# Patient Record
Sex: FEMALE | Race: BLACK OR AFRICAN AMERICAN | NOT HISPANIC OR LATINO | Employment: OTHER | RURAL
[De-identification: names, ages, dates, MRNs, and addresses within clinical notes are randomized per-mention and may not be internally consistent; named-entity substitution may affect disease eponyms.]

---

## 2014-05-13 LAB — CRC RECOMMENDATION EXT: NORMAL

## 2021-04-06 RX ORDER — HYDROCODONE BITARTRATE AND ACETAMINOPHEN 7.5; 325 MG/1; MG/1
1 TABLET ORAL 3 TIMES DAILY PRN
COMMUNITY
Start: 2021-03-01 | End: 2021-04-06 | Stop reason: SDUPTHER

## 2021-04-06 RX ORDER — POTASSIUM CHLORIDE 1500 MG/1
20 TABLET, EXTENDED RELEASE ORAL DAILY
Qty: 30 TABLET | Refills: 0 | Status: SHIPPED | OUTPATIENT
Start: 2021-04-06 | End: 2021-11-10

## 2021-04-06 RX ORDER — POTASSIUM CHLORIDE 1500 MG/1
20 TABLET, EXTENDED RELEASE ORAL DAILY
COMMUNITY
Start: 2021-03-01 | End: 2021-04-06 | Stop reason: SDUPTHER

## 2021-04-06 RX ORDER — LINACLOTIDE 145 UG/1
145 CAPSULE, GELATIN COATED ORAL DAILY
Qty: 30 CAPSULE | Refills: 3 | Status: SHIPPED | OUTPATIENT
Start: 2021-04-06 | End: 2021-05-17 | Stop reason: SDUPTHER

## 2021-04-06 RX ORDER — LINACLOTIDE 145 UG/1
1 CAPSULE, GELATIN COATED ORAL DAILY
COMMUNITY
Start: 2021-03-18 | End: 2021-04-06 | Stop reason: SDUPTHER

## 2021-04-06 RX ORDER — HYDROCODONE BITARTRATE AND ACETAMINOPHEN 7.5; 325 MG/1; MG/1
1 TABLET ORAL 3 TIMES DAILY PRN
Qty: 30 TABLET | Refills: 0 | Status: SHIPPED | OUTPATIENT
Start: 2021-04-06 | End: 2021-05-17 | Stop reason: SDUPTHER

## 2021-04-15 RX ORDER — NAPROXEN 500 MG/1
1 TABLET ORAL DAILY
COMMUNITY
Start: 2021-03-25 | End: 2021-05-17 | Stop reason: SDUPTHER

## 2021-04-15 RX ORDER — FUROSEMIDE 40 MG/1
1 TABLET ORAL DAILY
COMMUNITY
Start: 2021-03-17 | End: 2021-07-23 | Stop reason: SDUPTHER

## 2021-04-19 ENCOUNTER — OFFICE VISIT (OUTPATIENT)
Dept: FAMILY MEDICINE | Facility: CLINIC | Age: 66
End: 2021-04-19
Payer: MEDICARE

## 2021-04-19 VITALS
WEIGHT: 173 LBS | DIASTOLIC BLOOD PRESSURE: 71 MMHG | RESPIRATION RATE: 16 BRPM | BODY MASS INDEX: 29.53 KG/M2 | TEMPERATURE: 99 F | HEIGHT: 64 IN | OXYGEN SATURATION: 98 % | HEART RATE: 55 BPM | SYSTOLIC BLOOD PRESSURE: 144 MMHG

## 2021-04-19 DIAGNOSIS — G89.29 CHRONIC LEFT-SIDED LOW BACK PAIN WITHOUT SCIATICA: ICD-10-CM

## 2021-04-19 DIAGNOSIS — E87.6 HYPOKALEMIA: Primary | ICD-10-CM

## 2021-04-19 DIAGNOSIS — M54.50 CHRONIC LEFT-SIDED LOW BACK PAIN WITHOUT SCIATICA: ICD-10-CM

## 2021-04-19 PROCEDURE — 96372 THER/PROPH/DIAG INJ SC/IM: CPT | Mod: ,,, | Performed by: FAMILY MEDICINE

## 2021-04-19 PROCEDURE — 99214 PR OFFICE/OUTPT VISIT, EST, LEVL IV, 30-39 MIN: ICD-10-PCS | Mod: 25,,, | Performed by: FAMILY MEDICINE

## 2021-04-19 PROCEDURE — 99214 OFFICE O/P EST MOD 30 MIN: CPT | Mod: 25,,, | Performed by: FAMILY MEDICINE

## 2021-04-19 PROCEDURE — 96372 PR INJECTION,THERAP/PROPH/DIAG2ST, IM OR SUBCUT: ICD-10-PCS | Mod: ,,, | Performed by: FAMILY MEDICINE

## 2021-04-19 RX ORDER — KETOROLAC TROMETHAMINE 30 MG/ML
60 INJECTION, SOLUTION INTRAMUSCULAR; INTRAVENOUS
Status: COMPLETED | OUTPATIENT
Start: 2021-04-19 | End: 2021-04-19

## 2021-04-19 RX ORDER — TRAMADOL HYDROCHLORIDE 50 MG/1
50 TABLET ORAL EVERY 6 HOURS PRN
Qty: 21 TABLET | Refills: 0 | Status: SHIPPED | OUTPATIENT
Start: 2021-04-19 | End: 2021-11-10

## 2021-04-19 RX ADMIN — KETOROLAC TROMETHAMINE 60 MG: 30 INJECTION, SOLUTION INTRAMUSCULAR; INTRAVENOUS at 01:04

## 2021-04-29 DIAGNOSIS — N39.0 URINARY TRACT INFECTION WITHOUT HEMATURIA, SITE UNSPECIFIED: Primary | ICD-10-CM

## 2021-04-29 RX ORDER — NITROFURANTOIN 25; 75 MG/1; MG/1
100 CAPSULE ORAL 2 TIMES DAILY
Qty: 14 CAPSULE | Refills: 0 | Status: SHIPPED | OUTPATIENT
Start: 2021-04-29 | End: 2021-05-06

## 2021-05-17 ENCOUNTER — OFFICE VISIT (OUTPATIENT)
Dept: FAMILY MEDICINE | Facility: CLINIC | Age: 66
End: 2021-05-17
Payer: MEDICARE

## 2021-05-17 VITALS
HEART RATE: 50 BPM | HEIGHT: 62 IN | OXYGEN SATURATION: 100 % | RESPIRATION RATE: 16 BRPM | WEIGHT: 171 LBS | SYSTOLIC BLOOD PRESSURE: 126 MMHG | DIASTOLIC BLOOD PRESSURE: 71 MMHG | BODY MASS INDEX: 31.47 KG/M2 | TEMPERATURE: 99 F

## 2021-05-17 DIAGNOSIS — G89.29 CHRONIC BILATERAL LOW BACK PAIN WITHOUT SCIATICA: ICD-10-CM

## 2021-05-17 DIAGNOSIS — M54.50 CHRONIC BILATERAL LOW BACK PAIN WITHOUT SCIATICA: ICD-10-CM

## 2021-05-17 DIAGNOSIS — R60.9 RETENTION OF FLUID: Primary | ICD-10-CM

## 2021-05-17 DIAGNOSIS — R52 PAIN: ICD-10-CM

## 2021-05-17 DIAGNOSIS — K59.00 CONSTIPATION, UNSPECIFIED CONSTIPATION TYPE: ICD-10-CM

## 2021-05-17 PROCEDURE — 99214 OFFICE O/P EST MOD 30 MIN: CPT | Mod: ,,, | Performed by: FAMILY MEDICINE

## 2021-05-17 PROCEDURE — 80305 DRUG TEST PRSMV DIR OPT OBS: CPT | Mod: GA,RHCUB | Performed by: FAMILY MEDICINE

## 2021-05-17 PROCEDURE — 99214 PR OFFICE/OUTPT VISIT, EST, LEVL IV, 30-39 MIN: ICD-10-PCS | Mod: ,,, | Performed by: FAMILY MEDICINE

## 2021-05-17 RX ORDER — HYDROCODONE BITARTRATE AND ACETAMINOPHEN 7.5; 325 MG/1; MG/1
1 TABLET ORAL 3 TIMES DAILY PRN
Qty: 30 TABLET | Refills: 0 | Status: SHIPPED | OUTPATIENT
Start: 2021-05-17 | End: 2021-06-14 | Stop reason: SDUPTHER

## 2021-05-17 RX ORDER — FUROSEMIDE 40 MG/1
40 TABLET ORAL DAILY
Qty: 30 TABLET | Refills: 0 | Status: CANCELLED | OUTPATIENT
Start: 2021-05-17

## 2021-05-17 RX ORDER — LINACLOTIDE 145 UG/1
145 CAPSULE, GELATIN COATED ORAL DAILY
Qty: 30 CAPSULE | Refills: 3 | Status: SHIPPED | OUTPATIENT
Start: 2021-05-17 | End: 2021-07-23 | Stop reason: SDUPTHER

## 2021-05-17 RX ORDER — POTASSIUM CHLORIDE 1500 MG/1
20 TABLET, EXTENDED RELEASE ORAL DAILY
Qty: 30 TABLET | Refills: 0 | Status: CANCELLED | OUTPATIENT
Start: 2021-05-17

## 2021-05-17 RX ORDER — NAPROXEN 500 MG/1
500 TABLET ORAL DAILY
Qty: 60 TABLET | Refills: 0 | Status: SHIPPED | OUTPATIENT
Start: 2021-05-17 | End: 2021-06-14 | Stop reason: SDUPTHER

## 2021-05-18 ENCOUNTER — LAB REQUISITION (OUTPATIENT)
Dept: LAB | Facility: HOSPITAL | Age: 66
End: 2021-05-18
Attending: FAMILY MEDICINE
Payer: MEDICARE

## 2021-05-18 DIAGNOSIS — E87.6 HYPOKALEMIA: ICD-10-CM

## 2021-05-18 DIAGNOSIS — N39.0 URINARY TRACT INFECTION, SITE NOT SPECIFIED: ICD-10-CM

## 2021-05-18 LAB
ALBUMIN SERPL BCP-MCNC: 3.3 G/DL (ref 3.5–5)
ALBUMIN/GLOB SERPL: 1.1 {RATIO}
ALP SERPL-CCNC: 86 U/L (ref 55–142)
ALT SERPL W P-5'-P-CCNC: 27 U/L (ref 13–56)
ANION GAP SERPL CALCULATED.3IONS-SCNC: 9 MMOL/L (ref 7–16)
AST SERPL W P-5'-P-CCNC: 21 U/L (ref 15–37)
BACTERIA #/AREA URNS HPF: ABNORMAL /HPF
BASOPHILS # BLD AUTO: 0.01 K/UL (ref 0–0.2)
BASOPHILS NFR BLD AUTO: 0.2 % (ref 0–1)
BILIRUB SERPL-MCNC: 0.9 MG/DL (ref 0–1.2)
BILIRUB UR QL STRIP: NEGATIVE
BUN SERPL-MCNC: 12 MG/DL (ref 7–18)
BUN/CREAT SERPL: 16 (ref 6–20)
CALCIUM SERPL-MCNC: 8.4 MG/DL (ref 8.5–10.1)
CHLORIDE SERPL-SCNC: 106 MMOL/L (ref 98–107)
CLARITY UR: ABNORMAL
CO2 SERPL-SCNC: 28 MMOL/L (ref 21–32)
COLOR UR: YELLOW
CREAT SERPL-MCNC: 0.76 MG/DL (ref 0.55–1.02)
CTP QC/QA: YES
DIFFERENTIAL METHOD BLD: ABNORMAL
EOSINOPHIL # BLD AUTO: 0.02 K/UL (ref 0–0.5)
EOSINOPHIL NFR BLD AUTO: 0.4 % (ref 1–4)
ERYTHROCYTE [DISTWIDTH] IN BLOOD BY AUTOMATED COUNT: 13.3 % (ref 11.5–14.5)
GLOBULIN SER-MCNC: 3.1 G/DL (ref 2–4)
GLUCOSE SERPL-MCNC: 101 MG/DL (ref 74–106)
GLUCOSE UR STRIP-MCNC: NEGATIVE MG/DL
HCT VFR BLD AUTO: 33.7 % (ref 38–47)
HGB BLD-MCNC: 11 G/DL (ref 12–16)
KETONES UR STRIP-SCNC: NEGATIVE MG/DL
LEUKOCYTE ESTERASE UR QL STRIP: ABNORMAL
LYMPHOCYTES # BLD AUTO: 1.79 K/UL (ref 1–4.8)
LYMPHOCYTES NFR BLD AUTO: 38.8 % (ref 27–41)
MCH RBC QN AUTO: 29.6 PG (ref 27–31)
MCHC RBC AUTO-ENTMCNC: 32.6 G/DL (ref 32–36)
MCV RBC AUTO: 90.6 FL (ref 80–96)
MONOCYTES # BLD AUTO: 0.24 K/UL (ref 0–0.8)
MONOCYTES NFR BLD AUTO: 5.2 % (ref 2–6)
MPC BLD CALC-MCNC: 10.8 FL (ref 9.4–12.4)
MUCOUS THREADS #/AREA URNS HPF: ABNORMAL /HPF
NEUTROPHILS # BLD AUTO: 2.55 K/UL (ref 1.8–7.7)
NEUTROPHILS NFR BLD AUTO: 55.4 % (ref 53–65)
NITRITE UR QL STRIP: NEGATIVE
PH UR STRIP: 6.5 PH UNITS
PLATELET # BLD AUTO: 307 K/UL (ref 150–400)
POC (AMP) AMPHETAMINE: NEGATIVE
POC (BAR) BARBITURATES: NEGATIVE
POC (BUP) BUPRENORPHINE: NEGATIVE
POC (BZO) BENZODIAZEPINES: NEGATIVE
POC (COC) COCAINE: NEGATIVE
POC (MDMA) METHYLENEDIOXYMETHAMPHETAMINE 3,4: NEGATIVE
POC (MET) METHAMPHETAMINE: NEGATIVE
POC (MOP) OPIATES: NEGATIVE
POC (MTD) METHADONE: NEGATIVE
POC (OXY) OXYCODONE: NEGATIVE
POC (PCP) PHENCYCLIDINE: NEGATIVE
POC (TCA) TRICYCLIC ANTIDEPRESSANTS: NEGATIVE
POC TEMPERATURE (URINE): 92
POC THC: NEGATIVE
POTASSIUM SERPL-SCNC: 4 MMOL/L (ref 3.5–5.1)
PROT SERPL-MCNC: 6.4 G/DL (ref 6.4–8.2)
PROT UR QL STRIP: NEGATIVE
RBC # BLD AUTO: 3.72 M/UL (ref 4.2–5.4)
RBC # UR STRIP: NEGATIVE /UL
RBC #/AREA URNS HPF: ABNORMAL /HPF
SODIUM SERPL-SCNC: 139 MMOL/L (ref 136–145)
SP GR UR STRIP: 1.02
SQUAMOUS #/AREA URNS LPF: ABNORMAL /LPF
UROBILINOGEN UR STRIP-ACNC: 0.2 MG/DL
WBC # BLD AUTO: 4.61 K/UL (ref 4.5–11)
WBC #/AREA URNS HPF: ABNORMAL /HPF

## 2021-05-18 PROCEDURE — 85025 COMPLETE CBC W/AUTO DIFF WBC: CPT | Performed by: FAMILY MEDICINE

## 2021-05-18 PROCEDURE — 80053 COMPREHEN METABOLIC PANEL: CPT | Performed by: FAMILY MEDICINE

## 2021-05-18 PROCEDURE — 87086 URINE CULTURE/COLONY COUNT: CPT | Performed by: FAMILY MEDICINE

## 2021-05-18 PROCEDURE — 81001 URINALYSIS AUTO W/SCOPE: CPT | Performed by: FAMILY MEDICINE

## 2021-05-20 LAB — UA COMPLETE W REFLEX CULTURE PNL UR: NORMAL

## 2021-06-14 DIAGNOSIS — R52 PAIN: ICD-10-CM

## 2021-06-14 RX ORDER — NAPROXEN 500 MG/1
500 TABLET ORAL DAILY
Qty: 60 TABLET | Refills: 0 | Status: SHIPPED | OUTPATIENT
Start: 2021-06-14 | End: 2021-07-23 | Stop reason: SDUPTHER

## 2021-06-14 RX ORDER — HYDROCODONE BITARTRATE AND ACETAMINOPHEN 7.5; 325 MG/1; MG/1
1 TABLET ORAL 3 TIMES DAILY PRN
Qty: 30 TABLET | Refills: 0 | Status: SHIPPED | OUTPATIENT
Start: 2021-06-14 | End: 2021-07-22 | Stop reason: SDUPTHER

## 2021-07-22 ENCOUNTER — OFFICE VISIT (OUTPATIENT)
Dept: FAMILY MEDICINE | Facility: CLINIC | Age: 66
End: 2021-07-22
Payer: MEDICARE

## 2021-07-22 VITALS
HEIGHT: 64 IN | DIASTOLIC BLOOD PRESSURE: 77 MMHG | SYSTOLIC BLOOD PRESSURE: 120 MMHG | OXYGEN SATURATION: 99 % | HEART RATE: 53 BPM | WEIGHT: 165.63 LBS | RESPIRATION RATE: 18 BRPM | BODY MASS INDEX: 28.28 KG/M2

## 2021-07-22 DIAGNOSIS — R52 PAIN: ICD-10-CM

## 2021-07-22 DIAGNOSIS — K59.00 CONSTIPATION, UNSPECIFIED CONSTIPATION TYPE: ICD-10-CM

## 2021-07-22 DIAGNOSIS — R51.9 NONINTRACTABLE HEADACHE, UNSPECIFIED CHRONICITY PATTERN, UNSPECIFIED HEADACHE TYPE: ICD-10-CM

## 2021-07-22 DIAGNOSIS — M54.50 CHRONIC BILATERAL LOW BACK PAIN WITHOUT SCIATICA: Primary | ICD-10-CM

## 2021-07-22 DIAGNOSIS — G89.29 CHRONIC BILATERAL LOW BACK PAIN WITHOUT SCIATICA: Primary | ICD-10-CM

## 2021-07-22 PROCEDURE — 99213 PR OFFICE/OUTPT VISIT, EST, LEVL III, 20-29 MIN: ICD-10-PCS | Mod: ,,, | Performed by: INTERNAL MEDICINE

## 2021-07-22 PROCEDURE — 99213 OFFICE O/P EST LOW 20 MIN: CPT | Mod: ,,, | Performed by: INTERNAL MEDICINE

## 2021-07-22 RX ORDER — HYDROCODONE BITARTRATE AND ACETAMINOPHEN 7.5; 325 MG/1; MG/1
1 TABLET ORAL 3 TIMES DAILY PRN
Qty: 30 TABLET | Refills: 0 | Status: SHIPPED | OUTPATIENT
Start: 2021-07-22 | End: 2021-09-15 | Stop reason: SDUPTHER

## 2021-07-26 RX ORDER — FUROSEMIDE 40 MG/1
40 TABLET ORAL DAILY
Qty: 30 TABLET | Refills: 3 | Status: SHIPPED | OUTPATIENT
Start: 2021-07-26 | End: 2021-11-10

## 2021-07-26 RX ORDER — NAPROXEN 500 MG/1
500 TABLET ORAL DAILY
Qty: 30 TABLET | Refills: 0 | Status: SHIPPED | OUTPATIENT
Start: 2021-07-26 | End: 2021-10-18

## 2021-07-26 RX ORDER — LINACLOTIDE 145 UG/1
145 CAPSULE, GELATIN COATED ORAL DAILY
Qty: 30 CAPSULE | Refills: 3 | Status: SHIPPED | OUTPATIENT
Start: 2021-07-26 | End: 2021-11-10 | Stop reason: SDUPTHER

## 2021-07-26 RX ORDER — DOCUSATE SODIUM 100 MG/1
200 CAPSULE, LIQUID FILLED ORAL 2 TIMES DAILY
Qty: 60 CAPSULE | Refills: 3 | Status: SHIPPED | OUTPATIENT
Start: 2021-07-26 | End: 2021-11-10

## 2021-08-13 ENCOUNTER — HOSPITAL ENCOUNTER (EMERGENCY)
Facility: HOSPITAL | Age: 66
Discharge: HOME | End: 2021-08-13
Payer: MEDICARE

## 2021-08-13 VITALS
TEMPERATURE: 99 F | WEIGHT: 170 LBS | RESPIRATION RATE: 20 BRPM | OXYGEN SATURATION: 96 % | HEART RATE: 70 BPM | BODY MASS INDEX: 31.28 KG/M2 | DIASTOLIC BLOOD PRESSURE: 80 MMHG | SYSTOLIC BLOOD PRESSURE: 117 MMHG | HEIGHT: 62 IN

## 2021-08-13 DIAGNOSIS — K59.01 CONSTIPATION BY DELAYED COLONIC TRANSIT: ICD-10-CM

## 2021-08-13 DIAGNOSIS — K58.1 IRRITABLE BOWEL SYNDROME WITH CONSTIPATION: Primary | ICD-10-CM

## 2021-08-13 PROCEDURE — 99282 EMERGENCY DEPT VISIT SF MDM: CPT | Mod: GF | Performed by: NURSE PRACTITIONER

## 2021-08-13 PROCEDURE — 99283 EMERGENCY DEPT VISIT LOW MDM: CPT

## 2021-08-13 PROCEDURE — 25000003 PHARM REV CODE 250: Performed by: NURSE PRACTITIONER

## 2021-08-13 RX ORDER — SYRING-NEEDL,DISP,INSUL,0.3 ML 29 G X1/2"
296 SYRINGE, EMPTY DISPOSABLE MISCELLANEOUS
Status: COMPLETED | OUTPATIENT
Start: 2021-08-13 | End: 2021-08-13

## 2021-08-13 RX ADMIN — Medication 296 ML: at 09:08

## 2021-08-14 ENCOUNTER — TELEPHONE (OUTPATIENT)
Dept: EMERGENCY MEDICINE | Facility: HOSPITAL | Age: 66
End: 2021-08-14

## 2021-09-15 DIAGNOSIS — R52 PAIN: ICD-10-CM

## 2021-09-15 RX ORDER — HYDROCODONE BITARTRATE AND ACETAMINOPHEN 7.5; 325 MG/1; MG/1
1 TABLET ORAL 3 TIMES DAILY PRN
Qty: 30 TABLET | Refills: 0 | Status: SHIPPED | OUTPATIENT
Start: 2021-09-15 | End: 2022-02-11 | Stop reason: SDUPTHER

## 2021-10-01 ENCOUNTER — OFFICE VISIT (OUTPATIENT)
Dept: PAIN MEDICINE | Facility: CLINIC | Age: 66
End: 2021-10-01
Payer: MEDICARE

## 2021-10-01 VITALS
BODY MASS INDEX: 30.91 KG/M2 | SYSTOLIC BLOOD PRESSURE: 133 MMHG | HEIGHT: 62 IN | WEIGHT: 168 LBS | DIASTOLIC BLOOD PRESSURE: 81 MMHG | HEART RATE: 72 BPM

## 2021-10-01 DIAGNOSIS — Z79.899 ENCOUNTER FOR LONG-TERM (CURRENT) USE OF OTHER MEDICATIONS: ICD-10-CM

## 2021-10-01 DIAGNOSIS — M79.10 MYALGIA: Chronic | ICD-10-CM

## 2021-10-01 DIAGNOSIS — R30.0 DYSURIA: ICD-10-CM

## 2021-10-01 DIAGNOSIS — M54.9 DORSALGIA, UNSPECIFIED: Chronic | ICD-10-CM

## 2021-10-01 DIAGNOSIS — M54.50 CHRONIC BILATERAL LOW BACK PAIN WITHOUT SCIATICA: Primary | Chronic | ICD-10-CM

## 2021-10-01 DIAGNOSIS — G89.29 CHRONIC BILATERAL LOW BACK PAIN WITHOUT SCIATICA: Chronic | ICD-10-CM

## 2021-10-01 DIAGNOSIS — M54.50 CHRONIC BILATERAL LOW BACK PAIN WITHOUT SCIATICA: Chronic | ICD-10-CM

## 2021-10-01 DIAGNOSIS — M54.17 LUMBOSACRAL RADICULOPATHY: Chronic | ICD-10-CM

## 2021-10-01 DIAGNOSIS — G89.29 CHRONIC BILATERAL LOW BACK PAIN WITHOUT SCIATICA: Primary | Chronic | ICD-10-CM

## 2021-10-01 LAB

## 2021-10-01 PROCEDURE — 99204 OFFICE O/P NEW MOD 45 MIN: CPT | Mod: S$PBB,,, | Performed by: PAIN MEDICINE

## 2021-10-01 PROCEDURE — G0481 DRUG SCREEN DEFINITIVE 14, URINE: ICD-10-PCS | Mod: ,,, | Performed by: CLINICAL MEDICAL LABORATORY

## 2021-10-01 PROCEDURE — 99204 PR OFFICE/OUTPT VISIT, NEW, LEVL IV, 45-59 MIN: ICD-10-PCS | Mod: S$PBB,,, | Performed by: PAIN MEDICINE

## 2021-10-01 PROCEDURE — 80305 DRUG TEST PRSMV DIR OPT OBS: CPT | Mod: PBBFAC | Performed by: PAIN MEDICINE

## 2021-10-01 PROCEDURE — 99215 OFFICE O/P EST HI 40 MIN: CPT | Mod: PBBFAC | Performed by: PAIN MEDICINE

## 2021-10-01 PROCEDURE — G0481 DRUG TEST DEF 8-14 CLASSES: HCPCS | Mod: ,,, | Performed by: CLINICAL MEDICAL LABORATORY

## 2021-10-01 RX ORDER — HYDROCODONE BITARTRATE AND ACETAMINOPHEN 7.5; 325 MG/1; MG/1
1 TABLET ORAL
Qty: 30 TABLET | Refills: 0 | Status: SHIPPED | OUTPATIENT
Start: 2021-10-15 | End: 2021-11-10 | Stop reason: SDUPTHER

## 2021-10-06 LAB
6-ACETYLMORPHINE, URINE (RUSH): NEGATIVE 10 NG/ML
7-AMINOCLONAZEPAM, URINE (RUSH): NEGATIVE 25 NG/ML
A-HYDROXYALPRAZOLAM, URINE (RUSH): NEGATIVE 25 NG/ML
ACETYL FENTANYL, URINE (RUSH): NEGATIVE 2.5 NG/ML
ACETYL NORFENTANYL OXALATE, URINE (RUSH): NEGATIVE 5 NG/ML
AMPHET UR QL SCN: NEGATIVE 100 NG/ML
BENZOYLECGONINE, URINE (RUSH): NEGATIVE 100 NG/ML
BUPRENORPHINE UR QL SCN: NEGATIVE 25 NG/ML
CODEINE, URINE (RUSH): NEGATIVE 25 NG/ML
CREAT UR-MCNC: 186 MG/DL (ref 28–219)
EDDP, URINE (RUSH): NEGATIVE 25 NG/ML
FENTANYL, URINE (RUSH): NEGATIVE 2.5 NG/ML
HYDROCODONE, URINE (RUSH): 28.8 25 NG/ML
HYDROMORPHONE, URINE (RUSH): 44.4 25 NG/ML
LORAZEPAM, URINE (RUSH): NEGATIVE 25 NG/ML
METHADONE UR QL SCN: NEGATIVE 25 NG/ML
METHAMPHET UR QL SCN: NEGATIVE 100 NG/ML
MORPHINE, URINE (RUSH): NEGATIVE 25 NG/ML
NORBUPRENORPHINE, URINE (RUSH): NEGATIVE 25 NG/ML
NORDIAZEPAM, URINE (RUSH): NEGATIVE 25 NG/ML
NORFENTANYL OXALATE, URINE (RUSH): NEGATIVE 5 NG/ML
NORHYDROCODONE, URINE (RUSH): 181.7 50 NG/ML
NOROXYCODONE HCL, URINE (RUSH): NEGATIVE 50 NG/ML
OXAZEPAM, URINE (RUSH): NEGATIVE 25 NG/ML
OXYCODONE UR QL SCN: NEGATIVE 25 NG/ML
OXYMORPHONE, URINE (RUSH): NEGATIVE 25 NG/ML
PH UR STRIP: 6 PH UNITS
SP GR UR STRIP: 1.02
TAPENTADOL, URINE (RUSH): NEGATIVE 25 NG/ML
TEMAZEPAM, URINE (RUSH): NEGATIVE 25 NG/ML
THC-COOH, URINE (RUSH): NEGATIVE 25 NG/ML
TRAMADOL, URINE (RUSH): NEGATIVE 100 NG/ML

## 2021-10-19 ENCOUNTER — CLINICAL SUPPORT (OUTPATIENT)
Dept: REHABILITATION | Facility: HOSPITAL | Age: 66
End: 2021-10-19
Payer: MEDICARE

## 2021-10-19 DIAGNOSIS — M54.9 DORSALGIA, UNSPECIFIED: Chronic | ICD-10-CM

## 2021-10-19 DIAGNOSIS — M54.50 CHRONIC BILATERAL LOW BACK PAIN WITHOUT SCIATICA: Primary | Chronic | ICD-10-CM

## 2021-10-19 DIAGNOSIS — G89.29 CHRONIC BILATERAL LOW BACK PAIN WITHOUT SCIATICA: Primary | Chronic | ICD-10-CM

## 2021-10-19 PROCEDURE — 97162 PT EVAL MOD COMPLEX 30 MIN: CPT

## 2021-10-26 ENCOUNTER — CLINICAL SUPPORT (OUTPATIENT)
Dept: REHABILITATION | Facility: HOSPITAL | Age: 66
End: 2021-10-26
Payer: MEDICARE

## 2021-10-26 DIAGNOSIS — G89.29 CHRONIC BILATERAL LOW BACK PAIN WITHOUT SCIATICA: Primary | ICD-10-CM

## 2021-10-26 DIAGNOSIS — M54.50 CHRONIC BILATERAL LOW BACK PAIN WITHOUT SCIATICA: Primary | ICD-10-CM

## 2021-10-26 PROCEDURE — 97110 THERAPEUTIC EXERCISES: CPT

## 2021-10-26 PROCEDURE — 97014 ELECTRIC STIMULATION THERAPY: CPT

## 2021-10-28 ENCOUNTER — CLINICAL SUPPORT (OUTPATIENT)
Dept: REHABILITATION | Facility: HOSPITAL | Age: 66
End: 2021-10-28
Payer: MEDICARE

## 2021-10-28 DIAGNOSIS — G89.29 CHRONIC LEFT-SIDED LOW BACK PAIN WITHOUT SCIATICA: Primary | ICD-10-CM

## 2021-10-28 DIAGNOSIS — M54.50 CHRONIC LEFT-SIDED LOW BACK PAIN WITHOUT SCIATICA: Primary | ICD-10-CM

## 2021-10-28 PROCEDURE — 97014 ELECTRIC STIMULATION THERAPY: CPT

## 2021-10-28 PROCEDURE — 97110 THERAPEUTIC EXERCISES: CPT

## 2021-11-02 ENCOUNTER — CLINICAL SUPPORT (OUTPATIENT)
Dept: REHABILITATION | Facility: HOSPITAL | Age: 66
End: 2021-11-02
Payer: MEDICARE

## 2021-11-02 DIAGNOSIS — G89.29 CHRONIC LEFT-SIDED LOW BACK PAIN WITHOUT SCIATICA: Primary | ICD-10-CM

## 2021-11-02 DIAGNOSIS — M54.50 CHRONIC LEFT-SIDED LOW BACK PAIN WITHOUT SCIATICA: Primary | ICD-10-CM

## 2021-11-02 PROCEDURE — 97110 THERAPEUTIC EXERCISES: CPT

## 2021-11-09 ENCOUNTER — CLINICAL SUPPORT (OUTPATIENT)
Dept: REHABILITATION | Facility: HOSPITAL | Age: 66
End: 2021-11-09
Payer: MEDICARE

## 2021-11-09 DIAGNOSIS — M54.50 CHRONIC LEFT-SIDED LOW BACK PAIN WITHOUT SCIATICA: ICD-10-CM

## 2021-11-09 DIAGNOSIS — G89.29 CHRONIC LEFT-SIDED LOW BACK PAIN WITHOUT SCIATICA: ICD-10-CM

## 2021-11-09 PROCEDURE — 97110 THERAPEUTIC EXERCISES: CPT | Mod: CQ

## 2021-11-10 ENCOUNTER — OFFICE VISIT (OUTPATIENT)
Dept: UROLOGY | Facility: CLINIC | Age: 66
End: 2021-11-10
Payer: MEDICARE

## 2021-11-10 VITALS
BODY MASS INDEX: 30.91 KG/M2 | WEIGHT: 168 LBS | DIASTOLIC BLOOD PRESSURE: 83 MMHG | HEART RATE: 70 BPM | HEIGHT: 62 IN | TEMPERATURE: 99 F | SYSTOLIC BLOOD PRESSURE: 135 MMHG | OXYGEN SATURATION: 97 %

## 2021-11-10 DIAGNOSIS — M53.3 DISORDER OF SACRUM: Chronic | ICD-10-CM

## 2021-11-10 DIAGNOSIS — K59.00 CONSTIPATION, UNSPECIFIED CONSTIPATION TYPE: ICD-10-CM

## 2021-11-10 DIAGNOSIS — N39.8 DYSFUNCTIONAL VOIDING OF URINE: Primary | ICD-10-CM

## 2021-11-10 DIAGNOSIS — N39.8 VOIDING DYSFUNCTION: ICD-10-CM

## 2021-11-10 PROBLEM — R30.0 DYSURIA: Status: ACTIVE | Noted: 2021-11-10

## 2021-11-10 PROBLEM — R30.0 DYSURIA: Status: RESOLVED | Noted: 2021-11-10 | Resolved: 2021-11-10

## 2021-11-10 PROCEDURE — 51798 US URINE CAPACITY MEASURE: CPT | Mod: PBBFAC | Performed by: NURSE PRACTITIONER

## 2021-11-10 PROCEDURE — 99214 PR OFFICE/OUTPT VISIT, EST, LEVL IV, 30-39 MIN: ICD-10-PCS | Mod: S$PBB,,, | Performed by: NURSE PRACTITIONER

## 2021-11-10 PROCEDURE — 99214 OFFICE O/P EST MOD 30 MIN: CPT | Mod: S$PBB,,, | Performed by: NURSE PRACTITIONER

## 2021-11-10 PROCEDURE — 99215 OFFICE O/P EST HI 40 MIN: CPT | Mod: PBBFAC | Performed by: NURSE PRACTITIONER

## 2021-11-10 RX ORDER — LINACLOTIDE 145 UG/1
145 CAPSULE, GELATIN COATED ORAL DAILY
Qty: 30 CAPSULE | Refills: 0 | Status: SHIPPED | OUTPATIENT
Start: 2021-11-10 | End: 2022-12-05

## 2021-11-11 ENCOUNTER — OFFICE VISIT (OUTPATIENT)
Dept: PAIN MEDICINE | Facility: CLINIC | Age: 66
End: 2021-11-11
Payer: MEDICARE

## 2021-11-11 ENCOUNTER — HOSPITAL ENCOUNTER (OUTPATIENT)
Dept: RADIOLOGY | Facility: HOSPITAL | Age: 66
Discharge: HOME | End: 2021-11-11
Attending: PHYSICIAN ASSISTANT
Payer: MEDICARE

## 2021-11-11 VITALS
HEART RATE: 62 BPM | DIASTOLIC BLOOD PRESSURE: 104 MMHG | RESPIRATION RATE: 18 BRPM | SYSTOLIC BLOOD PRESSURE: 142 MMHG | WEIGHT: 171 LBS | HEIGHT: 62 IN | BODY MASS INDEX: 31.47 KG/M2

## 2021-11-11 DIAGNOSIS — M54.17 LUMBOSACRAL RADICULOPATHY: ICD-10-CM

## 2021-11-11 DIAGNOSIS — M54.9 DORSALGIA, UNSPECIFIED: ICD-10-CM

## 2021-11-11 DIAGNOSIS — M53.3 DISORDER OF SACRUM: Chronic | ICD-10-CM

## 2021-11-11 DIAGNOSIS — M54.17 LUMBOSACRAL RADICULOPATHY: Primary | Chronic | ICD-10-CM

## 2021-11-11 DIAGNOSIS — E55.9 VITAMIN D DEFICIENCY: Chronic | ICD-10-CM

## 2021-11-11 DIAGNOSIS — M53.3 DISORDER OF SACRUM: ICD-10-CM

## 2021-11-11 PROCEDURE — 72110 X-RAY EXAM L-2 SPINE 4/>VWS: CPT | Mod: 26,,, | Performed by: RADIOLOGY

## 2021-11-11 PROCEDURE — 72110 XR LUMBAR SPINE 5 VIEW WITH FLEX AND EXT: ICD-10-PCS | Mod: 26,,, | Performed by: RADIOLOGY

## 2021-11-11 PROCEDURE — 99214 OFFICE O/P EST MOD 30 MIN: CPT | Mod: PBBFAC | Performed by: PHYSICIAN ASSISTANT

## 2021-11-11 PROCEDURE — 99214 PR OFFICE/OUTPT VISIT, EST, LEVL IV, 30-39 MIN: ICD-10-PCS | Mod: S$PBB,,, | Performed by: PHYSICIAN ASSISTANT

## 2021-11-11 PROCEDURE — 72110 X-RAY EXAM L-2 SPINE 4/>VWS: CPT | Mod: TC

## 2021-11-11 PROCEDURE — 99214 OFFICE O/P EST MOD 30 MIN: CPT | Mod: S$PBB,,, | Performed by: PHYSICIAN ASSISTANT

## 2021-11-11 RX ORDER — HYDROCODONE BITARTRATE AND ACETAMINOPHEN 7.5; 325 MG/1; MG/1
1 TABLET ORAL
Qty: 30 TABLET | Refills: 0 | Status: SHIPPED | OUTPATIENT
Start: 2021-12-14 | End: 2021-11-11

## 2021-11-11 RX ORDER — HYDROCODONE BITARTRATE AND ACETAMINOPHEN 7.5; 325 MG/1; MG/1
1 TABLET ORAL
Qty: 30 TABLET | Refills: 0 | Status: SHIPPED | OUTPATIENT
Start: 2021-11-18 | End: 2021-12-18

## 2021-11-11 RX ORDER — HYDROCODONE BITARTRATE AND ACETAMINOPHEN 7.5; 325 MG/1; MG/1
1 TABLET ORAL
Qty: 30 TABLET | Refills: 0 | Status: SHIPPED | OUTPATIENT
Start: 2021-12-18 | End: 2022-01-17

## 2021-11-11 RX ORDER — HYDROCODONE BITARTRATE AND ACETAMINOPHEN 7.5; 325 MG/1; MG/1
1 TABLET ORAL
Qty: 30 TABLET | Refills: 0 | Status: SHIPPED | OUTPATIENT
Start: 2021-11-14 | End: 2021-11-11

## 2021-11-12 ENCOUNTER — CLINICAL SUPPORT (OUTPATIENT)
Dept: REHABILITATION | Facility: HOSPITAL | Age: 66
End: 2021-11-12
Payer: MEDICARE

## 2021-11-12 DIAGNOSIS — G89.29 CHRONIC LEFT-SIDED LOW BACK PAIN WITHOUT SCIATICA: ICD-10-CM

## 2021-11-12 DIAGNOSIS — M54.50 CHRONIC LEFT-SIDED LOW BACK PAIN WITHOUT SCIATICA: ICD-10-CM

## 2021-11-12 PROCEDURE — 97110 THERAPEUTIC EXERCISES: CPT | Mod: CQ

## 2021-11-19 ENCOUNTER — HOSPITAL ENCOUNTER (EMERGENCY)
Facility: HOSPITAL | Age: 66
Discharge: HOME | End: 2021-11-19
Payer: MEDICARE

## 2021-11-19 VITALS
BODY MASS INDEX: 31.28 KG/M2 | TEMPERATURE: 98 F | RESPIRATION RATE: 16 BRPM | HEIGHT: 62 IN | HEART RATE: 56 BPM | OXYGEN SATURATION: 99 % | SYSTOLIC BLOOD PRESSURE: 110 MMHG | WEIGHT: 170 LBS | DIASTOLIC BLOOD PRESSURE: 56 MMHG

## 2021-11-19 DIAGNOSIS — K59.00 CONSTIPATION: ICD-10-CM

## 2021-11-19 DIAGNOSIS — K59.00 CONSTIPATION, UNSPECIFIED CONSTIPATION TYPE: Primary | ICD-10-CM

## 2021-11-19 PROCEDURE — 99282 EMERGENCY DEPT VISIT SF MDM: CPT | Mod: GF | Performed by: NURSE PRACTITIONER

## 2021-11-19 PROCEDURE — 99283 EMERGENCY DEPT VISIT LOW MDM: CPT

## 2021-11-19 PROCEDURE — 25000003 PHARM REV CODE 250: Performed by: NURSE PRACTITIONER

## 2021-11-19 RX ORDER — SYRING-NEEDL,DISP,INSUL,0.3 ML 29 G X1/2"
296 SYRINGE, EMPTY DISPOSABLE MISCELLANEOUS
Status: COMPLETED | OUTPATIENT
Start: 2021-11-19 | End: 2021-11-19

## 2021-11-19 RX ADMIN — Medication 296 ML: at 09:11

## 2021-11-21 ENCOUNTER — TELEPHONE (OUTPATIENT)
Dept: EMERGENCY MEDICINE | Facility: HOSPITAL | Age: 66
End: 2021-11-21
Payer: MEDICAID

## 2021-11-23 ENCOUNTER — TELEPHONE (OUTPATIENT)
Dept: EMERGENCY MEDICINE | Facility: HOSPITAL | Age: 66
End: 2021-11-23
Payer: MEDICAID

## 2021-12-06 ENCOUNTER — DOCUMENTATION ONLY (OUTPATIENT)
Dept: REHABILITATION | Facility: HOSPITAL | Age: 66
End: 2021-12-06

## 2021-12-06 ENCOUNTER — OFFICE VISIT (OUTPATIENT)
Dept: UROLOGY | Facility: CLINIC | Age: 66
End: 2021-12-06
Payer: MEDICARE

## 2021-12-06 VITALS
SYSTOLIC BLOOD PRESSURE: 126 MMHG | WEIGHT: 170 LBS | HEIGHT: 62 IN | TEMPERATURE: 99 F | OXYGEN SATURATION: 98 % | BODY MASS INDEX: 31.28 KG/M2 | DIASTOLIC BLOOD PRESSURE: 78 MMHG | HEART RATE: 75 BPM

## 2021-12-06 DIAGNOSIS — N39.8 DYSFUNCTIONAL VOIDING OF URINE: Primary | ICD-10-CM

## 2021-12-06 DIAGNOSIS — K59.00 CONSTIPATION, UNSPECIFIED CONSTIPATION TYPE: ICD-10-CM

## 2021-12-06 DIAGNOSIS — M53.3 DISORDER OF SACRUM: Chronic | ICD-10-CM

## 2021-12-06 PROCEDURE — 99214 PR OFFICE/OUTPT VISIT, EST, LEVL IV, 30-39 MIN: ICD-10-PCS | Mod: S$PBB,,, | Performed by: NURSE PRACTITIONER

## 2021-12-06 PROCEDURE — 99214 OFFICE O/P EST MOD 30 MIN: CPT | Mod: S$PBB,,, | Performed by: NURSE PRACTITIONER

## 2021-12-06 PROCEDURE — 99214 OFFICE O/P EST MOD 30 MIN: CPT | Mod: PBBFAC | Performed by: NURSE PRACTITIONER

## 2021-12-06 NOTE — PROGRESS NOTES
PHYSICAL THERAPY DISCHARGE:  Name: Xavier Rodriguez  [facility] Number: [id number]  Diagnosis:        Encounter Diagnosis   Name Primary?    Chronic left-sided low back pain without sciatica Yes      Physician: Ilsa Mendez MD    This patient was originally evaluated at our facility on: 10/19/21         Pt attended PT for a total of 5 Visits receiving: IFC estim, Therex, Postural Education, Body Mechanics Training, Home Exercise Instruction     This patient's last visit occurred on: 11/2/21      Measurements:      Lumbar   AROM  Strength   Flexion 70 4   Extension 30 3+   SBR 20 3+   SBL 20 3+   RR 65 3+   RL 65 3+     Goals as follows:  STG's Goals: 3 weeks  Pt in agreement with goals to improve independent functional mobility and activity tolerance.     1. Patient will correctly demonstrate independent performance of Home Exercise Program in 1 week. Goal Met.  2. Patient will report decreased low back pain to 3/10 to improve quality of life. Goal Met.  3. Patient will increase lumbar ROM by 5 degrees to improve functional mobility. Goal Met.  4. Patient will increase LE hip strength to 4/5 to improve functional activity.     LTG's Goals: 5 weeks     1. Patient will report decreased low back pain to 0/10 to improve quality of life.  2. Patient will increase lumbar ROM extension to 25 degrees to improve functional mobility.  3. Patient will increase LE hip strength to 5/5 to improve functional activity.        Pt was DC'd from our care secondary to: pt did not return to therapy.       Therapist: Danyel Munoz, PT  12/6/2021

## 2022-02-11 PROBLEM — M54.17 LUMBOSACRAL RADICULOPATHY: Status: ACTIVE | Noted: 2022-02-11

## 2022-02-11 PROBLEM — M54.17 LUMBOSACRAL RADICULOPATHY: Chronic | Status: ACTIVE | Noted: 2022-02-11

## 2022-02-11 NOTE — PROGRESS NOTES
Disclaimer:  This note has been generated using voice recognition software.  There may be type of graft focal areas that have been missed during a proof reading      Subjective:      Patient ID: Xavier Rodriguez is a 66 y.o. female.    Chief Complaint: Headache and Low-back Pain      Pain  This is a chronic problem. The current episode started more than 1 year ago. The problem occurs daily. The problem has been unchanged. Associated symptoms include arthralgias and neck pain. Pertinent negatives include no change in bowel habit, chest pain, chills, coughing, diaphoresis, fever, rash, sore throat, vertigo or vomiting.     Review of Systems   Constitutional: Negative for appetite change, chills, diaphoresis, fever and unexpected weight change.   HENT: Negative for drooling, ear discharge, ear pain, facial swelling, nosebleeds, sore throat, trouble swallowing, voice change and goiter.    Eyes: Negative for photophobia, pain, discharge, redness and visual disturbance.   Respiratory: Negative for apnea, cough, choking, chest tightness, shortness of breath, wheezing and stridor.    Cardiovascular: Negative for chest pain, palpitations and leg swelling.   Gastrointestinal: Negative for abdominal distention, change in bowel habit, diarrhea, rectal pain, vomiting, fecal incontinence and change in bowel habit.   Endocrine: Negative for cold intolerance, heat intolerance, polydipsia, polyphagia and polyuria.   Genitourinary: Negative for bladder incontinence, dysuria, flank pain, frequency and hot flashes.   Musculoskeletal: Positive for arthralgias, back pain, leg pain and neck pain.   Integumentary:  Negative for color change, pallor and rash.   Allergic/Immunologic: Negative for immunocompromised state.   Neurological: Negative for dizziness, vertigo, seizures, syncope, facial asymmetry, speech difficulty, light-headedness, disturbances in coordination, memory loss and coordination difficulties.   Hematological: Negative for  "adenopathy. Does not bruise/bleed easily.   Psychiatric/Behavioral: Negative for agitation, behavioral problems, confusion, decreased concentration, dysphoric mood, hallucinations, self-injury and suicidal ideas. The patient is not nervous/anxious and is not hyperactive.             Objective:  Vitals:    02/16/22 0950 02/16/22 0951   BP: 108/68    Pulse: 65    Resp: 18    Weight: 76.2 kg (168 lb)    Height: 5' 2" (1.575 m)    PainSc: 10-Worst pain ever 10-Worst pain ever         Physical Exam  Vitals and nursing note reviewed. Exam conducted with a chaperone present.   Constitutional:       General: She is awake. She is not in acute distress.     Appearance: Normal appearance. She is not toxic-appearing or diaphoretic.   HENT:      Head: Normocephalic and atraumatic.      Nose: Nose normal.      Mouth/Throat:      Mouth: Mucous membranes are moist.      Pharynx: Oropharynx is clear.   Eyes:      Conjunctiva/sclera: Conjunctivae normal.      Pupils: Pupils are equal, round, and reactive to light.   Cardiovascular:      Rate and Rhythm: Normal rate.   Pulmonary:      Effort: Pulmonary effort is normal. No respiratory distress.   Abdominal:      Palpations: Abdomen is soft.      Tenderness: There is no guarding.   Musculoskeletal:         General: Normal range of motion.      Cervical back: Normal range of motion and neck supple. No rigidity.   Skin:     General: Skin is warm and dry.      Coloration: Skin is not jaundiced or pale.   Neurological:      General: No focal deficit present.      Mental Status: She is alert and oriented to person, place, and time. Mental status is at baseline.      Cranial Nerves: Cranial nerves are intact. No cranial nerve deficit (II-XII).   Psychiatric:         Mood and Affect: Mood normal.         Behavior: Behavior normal. Behavior is cooperative.         Thought Content: Thought content normal.           X-Ray Abdomen AP 1 View (KUB)  Narrative: EXAMINATION:  XR ABDOMEN AP 1 " VIEW    CLINICAL HISTORY:  Constipation, unspecified    TECHNIQUE:  XR ABDOMEN AP 1 VIEW    COMPARISON:  Comparisons were reviewed, if available.    FINDINGS:  Lower lobes are clear    There is no bowel obstruction.  No free air.  No obvious renal calculi.  Mild colonic stool.    No acute bone findings.  Impression: As above    Electronically signed by: Wilmar Kelly  Date:    11/19/2021  Time:    20:32       Lab Visit on 11/11/2021   Component Date Value Ref Range Status    Sodium 11/11/2021 140  136 - 145 mmol/L Final    Potassium 11/11/2021 3.4* 3.5 - 5.1 mmol/L Final    Chloride 11/11/2021 100  98 - 107 mmol/L Final    CO2 11/11/2021 32  21 - 32 mmol/L Final    Anion Gap 11/11/2021 11  7 - 16 mmol/L Final    Glucose 11/11/2021 109* 74 - 106 mg/dL Final    BUN 11/11/2021 12  7 - 18 mg/dL Final    Creatinine 11/11/2021 0.99  0.55 - 1.02 mg/dL Final    BUN/Creatinine Ratio 11/11/2021 12  6 - 20 Final    Calcium 11/11/2021 9.3  8.5 - 10.1 mg/dL Final    Total Protein 11/11/2021 8.0  6.4 - 8.2 g/dL Final    Albumin 11/11/2021 4.4  3.5 - 5.0 g/dL Final    Globulin 11/11/2021 3.6  2.0 - 4.0 g/dL Final    A/G Ratio 11/11/2021 1.2   Final    Bilirubin, Total 11/11/2021 1.5* 0.0 - 1.2 mg/dL Final    Alk Phos 11/11/2021 91  55 - 142 U/L Final    ALT 11/11/2021 16  13 - 56 U/L Final    AST 11/11/2021 18  15 - 37 U/L Final    eGFR  11/11/2021 72  >=60 mL/min/1.73m² Final    RA Titer 11/11/2021 <10  0 - 15 IU/mL Final    CRP 11/11/2021 <0.29  0.00 - 0.80 mg/dL Final    ESR Westergren 11/11/2021 22  0 - 30 mm/Hr Final    Syphilis Ab Interpretation 11/11/2021 Non-Reactive  Non-Reactive Final    Vitamin D 25-Hydroxy, Blood 11/11/2021 9.5  ng/mL Final    Hepatitis C Ab 11/11/2021 Non-Reactive  Non-Reactive Final    Uric Acid 11/11/2021 5.8  2.6 - 6.0 mg/dL Final    Antistreptolysin O (ASO) 11/11/2021 <13.0  0.0 - 408.0 IU/mL Final    Anti-dsDNA 11/11/2021 Negative  Negative Final     Anti-DSDNA (IU) 11/11/2021 6  0 - 24 IU Final    Anaplasma phagocytophilum Ab, IgG,S 11/11/2021 <1:64  <1:64 titer Final    Ehrlichia Chaffeensis (HME) Ab, IgG 11/11/2021 <1:64  <1:64 titer Final    Spotted Fever Group Ab, IgG, S 11/11/2021 1:64* <1:64 Final    Spotted Fever Group Ab, IgM, S 11/11/2021 <1:64  <1:64 Final    Lyme IgG/IgM 11/11/2021 Non-Reactive  Non-Reactive Final    WBC 11/11/2021 5.59  4.50 - 11.00 K/uL Final    RBC 11/11/2021 4.52  4.20 - 5.40 M/uL Final    Hemoglobin 11/11/2021 13.3  12.0 - 16.0 g/dL Final    Hematocrit 11/11/2021 40.3  38.0 - 47.0 % Final    MCV 11/11/2021 89.2  80.0 - 96.0 fL Final    MCH 11/11/2021 29.4  27.0 - 31.0 pg Final    MCHC 11/11/2021 33.0  32.0 - 36.0 g/dL Final    RDW 11/11/2021 14.6* 11.5 - 14.5 % Final    Platelet Count 11/11/2021 398  150 - 400 K/uL Final    MPV 11/11/2021 10.4  9.4 - 12.4 fL Final    Neutrophils % 11/11/2021 54.8  53.0 - 65.0 % Final    Lymphocytes % 11/11/2021 40.1  27.0 - 41.0 % Final    Monocytes % 11/11/2021 4.5  2.0 - 6.0 % Final    Eosinophils % 11/11/2021 0.2* 1.0 - 4.0 % Final    Basophils % 11/11/2021 0.2  0.0 - 1.0 % Final    Immature Granulocytes % 11/11/2021 0.2  0.0 - 0.4 % Final    nRBC, Auto 11/11/2021 0.0  <=0.0 % Final    Neutrophils, Abs 11/11/2021 3.07  1.80 - 7.70 K/uL Final    Lymphocytes, Absolute 11/11/2021 2.24  1.00 - 4.80 K/uL Final    Monocytes, Absolute 11/11/2021 0.25  0.00 - 0.80 K/uL Final    Eosinophils, Absolute 11/11/2021 0.01  0.00 - 0.50 K/uL Final    Basophils, Absolute 11/11/2021 0.01  0.00 - 0.20 K/uL Final    Immature Granulocytes, Absolute 11/11/2021 0.01  0.00 - 0.04 K/uL Final    nRBC, Absolute 11/11/2021 0.00  <=0.00 x10e3/uL Final    Diff Type 11/11/2021 Auto   Final   Office Visit on 10/01/2021   Component Date Value Ref Range Status    POC Amphetamines 10/01/2021 Negative  Negative, Inconclusive Final    POC Barbiturates 10/01/2021 Negative  Negative, Inconclusive  Final    POC Benzodiazepines 10/01/2021 Negative  Negative, Inconclusive Final    POC Cocaine 10/01/2021 Negative  Negative, Inconclusive Final    POC THC 10/01/2021 Negative  Negative, Inconclusive Final    POC Methadone 10/01/2021 Negative  Negative, Inconclusive Final    POC Methamphetamine 10/01/2021 Negative  Negative, Inconclusive Final    POC Opiates 10/01/2021 Negative  Negative, Inconclusive Final    POC Oxycodone 10/01/2021 Negative  Negative, Inconclusive Final    POC Phencyclidine 10/01/2021 Negative  Negative, Inconclusive Final    POC Methylenedioxymethamphetamine * 10/01/2021 Negative  Negative, Inconclusive Final    POC Tricyclic Antidepressants 10/01/2021 Negative  Negative, Inconclusive Final    POC Buprenorphine 10/01/2021 Negative   Final     Acceptable 10/01/2021 Yes   Final    POC Temperature (Urine) 10/01/2021 92   Final    pH, UA 10/01/2021 6.0  5.0, 5.5, 6.0, 6.5, 7.0, 7.5, 8.0 pH Units Final    Specific [place], UA 10/01/2021 1.025  <=1.005, 1.010, 1.015, 1.020, 1.025, 1.030 Final    Creatinine, Urine 10/01/2021 186  28 - 219 mg/dL Final    6-Acetylmorphine 10/01/2021 Negative  10 ng/mL Final    7-Aminoclonazepam 10/01/2021 Negative  Negative 25 ng/mL Final    a-Hydroxyalprazolam 10/01/2021 Negative  25 ng/mL Final    Acetyl Fentanyl 10/01/2021 Negative  2.5 ng/mL Final    Acetyl Norfentanyl Oxalate 10/01/2021 Negative  5 ng/mL Final    Benzoylecgonine 10/01/2021 Negative  100 ng/mL Final    Buprenorphine 10/01/2021 Negative  25 ng/mL Final    Codeine 10/01/2021 Negative  25 ng/mL Final    EDDP 10/01/2021 Negative  25 ng/mL Final    Fentanyl 10/01/2021 Negative  2.5 ng/mL Final    Hydrocodone 10/01/2021 28.8* <25.0 25 ng/mL Final    Hydromorphone 10/01/2021 44.4* <25.0 25 ng/mL Final    Lorazepam 10/01/2021 Negative  25 ng/mL Final    Morphine 10/01/2021 Negative  25 ng/mL Final    Norbuprenorphine 10/01/2021 Negative  25 ng/mL Final     Nordiazepam 10/01/2021 Negative  25 ng/mL Final    Norfentanyl Oxalate 10/01/2021 Negative  5 ng/mL Final    Norhydrocodone 10/01/2021 181.7* <50.0 50 ng/mL Final    Noroxycodone HCL 10/01/2021 Negative  50 ng/mL Final    Oxazepam 10/01/2021 Negative  25 ng/mL Final    Oxymorphone 10/01/2021 Negative  25 ng/mL Final    Tapentadol 10/01/2021 Negative  25 ng/mL Final    Temazepam 10/01/2021 Negative  25 ng/mL Final    THC-COOH 10/01/2021 Negative  25 ng/mL Final    Tramadol 10/01/2021 Negative  100 ng/mL Final    Amphetamine, Urine 10/01/2021 Negative  Negative 100 ng/mL Final    Methamphetamines, Urine 10/01/2021 Negative  Negative 100 ng/mL Final    Methadone, Urine 10/01/2021 Negative  Negative 25 ng/mL Final    Oxycodone, Urine 10/01/2021 Negative  Negative 25 ng/mL Final           Assessment:      1. Lumbosacral radiculopathy    2. Disorder of sacrum    3. Encounter for long-term (current) use of other medications    4. Vitamin D deficiency    5. Spotted fever          Orders Placed This Encounter   Procedures    Ambulatory referral/consult to Physical/Occupational Therapy     Standing Status:   Future     Standing Expiration Date:   3/16/2023     Referral Priority:   Routine     Referral Type:   Physical Medicine     Referral Reason:   Specialty Services Required     Number of Visits Requested:   1    Ambulatory referral/consult to Family Practice     Standing Status:   Future     Standing Expiration Date:   3/16/2023     Referral Priority:   Routine     Referral Type:   Consultation     Referral Reason:   Specialty Services Required     Referred to Provider:   Roslyn Mittal MD     Requested Specialty:   Family Medicine     Number of Visits Requested:   1    POCT Urine Drug Screen Presump     Interpretive Information:     Negative:  No drug detected at the cut off level.   Positive:  This result represents presumptive positive for the   tested drug, other substances may yield a  positive response other   than the analyte of interest. This result should be utilized for   diagnostic purpose only. Confirmation testing will be performed upon physician request only.            A's of Opioid Risk Assessment  Activity:Patient can perform ADL.   Analgesia:Patients pain is partially controlled by current medication. Patient has tried OTC medications such as Tylenol and Ibuprofen with out relief.   Adverse Effects: Patient denies constipation or sedation.  Aberrant Behavior:  reviewed with no aberrant drug seeking/taking behavior.  Overdose reversal drug naloxone discussed    Drug screen reviewed      Requested Prescriptions     Signed Prescriptions Disp Refills    HYDROcodone-acetaminophen (NORCO) 7.5-325 mg per tablet 30 tablet 0     Sig: Take 1 tablet by mouth once daily.    HYDROcodone-acetaminophen (NORCO) 7.5-325 mg per tablet 30 tablet 0     Sig: Take 1 tablet by mouth once daily.    ergocalciferol (VITAMIN D2) 50,000 unit Cap 8 capsule 0     Sig: Take 1 capsule (50,000 Units total) by mouth every 7 days. for 8 doses         Plan:    Recheck vitamin-D November 2022    Follow-up x-rays lumbar spine labs    Definitive drug screen October 1, 2021 consistent hydrocodone present    Physical therapy notes reviewed [facility] did not complete physical therapy    She is requesting another physical therapy prescription    Rheumatoid factor negative    Lyme disease negative    Spotted fever positive she will discuss this with her primary care provider    Vitamin-D level 9 will start vitamin-D replacement today    Complaint back pain multiple joint pain chronic headache    She verbalized understanding need to keep appointments for compliance with drug screens    Continue current medication    Continue activity as directed complete physical therapy    Follow-up 2 months sooner if needed    Dr. Mendez, October 2022    Bring original prescription medication bottles/container/box with labels to each  visit

## 2022-02-16 ENCOUNTER — OFFICE VISIT (OUTPATIENT)
Dept: PAIN MEDICINE | Facility: CLINIC | Age: 67
End: 2022-02-16
Payer: MEDICARE

## 2022-02-16 VITALS
HEIGHT: 62 IN | HEART RATE: 65 BPM | WEIGHT: 168 LBS | RESPIRATION RATE: 18 BRPM | BODY MASS INDEX: 30.91 KG/M2 | DIASTOLIC BLOOD PRESSURE: 68 MMHG | SYSTOLIC BLOOD PRESSURE: 108 MMHG

## 2022-02-16 DIAGNOSIS — Z79.899 ENCOUNTER FOR LONG-TERM (CURRENT) USE OF OTHER MEDICATIONS: ICD-10-CM

## 2022-02-16 DIAGNOSIS — A77.9 SPOTTED FEVER: ICD-10-CM

## 2022-02-16 DIAGNOSIS — M53.3 DISORDER OF SACRUM: Chronic | ICD-10-CM

## 2022-02-16 DIAGNOSIS — E55.9 VITAMIN D DEFICIENCY: ICD-10-CM

## 2022-02-16 DIAGNOSIS — M54.17 LUMBOSACRAL RADICULOPATHY: Primary | Chronic | ICD-10-CM

## 2022-02-16 LAB

## 2022-02-16 PROCEDURE — 99215 OFFICE O/P EST HI 40 MIN: CPT | Mod: PBBFAC | Performed by: PHYSICIAN ASSISTANT

## 2022-02-16 PROCEDURE — 80305 DRUG TEST PRSMV DIR OPT OBS: CPT | Mod: PBBFAC | Performed by: PHYSICIAN ASSISTANT

## 2022-02-16 PROCEDURE — 99214 PR OFFICE/OUTPT VISIT, EST, LEVL IV, 30-39 MIN: ICD-10-PCS | Mod: S$PBB,,, | Performed by: PHYSICIAN ASSISTANT

## 2022-02-16 PROCEDURE — 99214 OFFICE O/P EST MOD 30 MIN: CPT | Mod: S$PBB,,, | Performed by: PHYSICIAN ASSISTANT

## 2022-02-16 RX ORDER — HYDROCODONE BITARTRATE AND ACETAMINOPHEN 7.5; 325 MG/1; MG/1
1 TABLET ORAL DAILY
Qty: 30 TABLET | Refills: 0 | Status: SHIPPED | OUTPATIENT
Start: 2022-02-16 | End: 2022-04-18 | Stop reason: SDUPTHER

## 2022-02-16 RX ORDER — ERGOCALCIFEROL 1.25 MG/1
50000 CAPSULE ORAL
Qty: 8 CAPSULE | Refills: 0 | Status: SHIPPED | OUTPATIENT
Start: 2022-02-16 | End: 2022-04-07

## 2022-02-16 RX ORDER — HYDROCODONE BITARTRATE AND ACETAMINOPHEN 7.5; 325 MG/1; MG/1
1 TABLET ORAL DAILY
Qty: 30 TABLET | Refills: 0 | Status: SHIPPED | OUTPATIENT
Start: 2022-03-18 | End: 2022-04-18 | Stop reason: SDUPTHER

## 2022-02-22 ENCOUNTER — CLINICAL SUPPORT (OUTPATIENT)
Dept: REHABILITATION | Facility: HOSPITAL | Age: 67
End: 2022-02-22
Payer: MEDICARE

## 2022-02-22 DIAGNOSIS — M53.3 DISORDER OF SACRUM: Chronic | ICD-10-CM

## 2022-02-22 DIAGNOSIS — M54.17 LUMBOSACRAL RADICULOPATHY: Primary | Chronic | ICD-10-CM

## 2022-02-22 PROCEDURE — 97162 PT EVAL MOD COMPLEX 30 MIN: CPT

## 2022-02-22 NOTE — PLAN OF CARE
[facility]   Physical Therapy Initial Evaluation    Name: Xavier Rodriguez  Clinic Number: [id number]    Therapy Diagnosis:   Encounter Diagnoses   Name Primary?    Disorder of sacrum     Lumbosacral radiculopathy Yes     Physician: Samy Recinos PA    Physician Orders: PT Eval and Treat   Medical Diagnosis from Referral: Lumbosacral radiculopathy; Disorder of sacrum  Evaluation Date: 2/22/2022  Plan of Care Expiration: 3/31/22  Visit # / Visits authorized: Eval approved and visits pending approval.    Time In: 934  Time Out: 1000    Precautions: Standard    Subjective   Date of onset: 3 years ago  History of current condition - Xavier reports: pt reports chronic low back pain for last 3 years. Presently she is complaining  of daily lumbar pain that seems to be getting worse. Pt reports her pain medication does help but pain always returns when medication wares off.    Medical History:   Past Medical History:   Diagnosis Date    Chronic low back pain     Depression     Dysfunctional voiding of urine 11/10/2021    Patient states she does not urinate.  She was taken off of lasix which was the only thing that helped her empty. PVR 0 ml Chronic constipation, lifelong.  Has been out of Linzess for a month now. Renal function WNL when last checked in May    Edema     Hypertension     IBS (irritable bowel syndrome)        Surgical History:   Xavier Rodriguez  has a past surgical history that includes Tumor removal.    Medications:   Xavier has a current medication list which includes the following prescription(s): ergocalciferol, hydrocodone-acetaminophen, [START ON 3/18/2022] hydrocodone-acetaminophen, linzess, and naproxen.    Allergies:   Review of patient's allergies indicates:  No Known Allergies     Imaging:   EXAMINATION:  XR LUMBAR SPINE 5 VIEW WITH FLEX AND EXT     CLINICAL HISTORY:  Low back pain, symptoms persist with > 6wks conservative treatment;  Radiculopathy, lumbosacral region      TECHNIQUE:  Five views of the lumbar spine plus flexion extension views were performed.     COMPARISON:  None.     FINDINGS:  There is no fracture identified.  No dislocation.  Alignment is fairly well maintained.  No change in alignment on dynamic imaging.  Degenerative endplate and facet changes of the mid and lower lumbar spine.     Impression:     Degenerative changes.  ========================  Prior Therapy: yes  Social History:  lives alone  Occupation: none  Prior Level of Function: independent   Current Level of Function: independent    Pain:  Current 6/10, worst 9/10, best 6/10   Location: bilateral back   Description: Sharp  Aggravating Factors: Sitting, Standing and Walking  Easing Factors: pain medication    Pts goals: to have pain relief      Objective     I. Supine Tests:    LLD no no       Special test Right  Left    SLR test < 60 degrees Negative Negative   SLR test > 60 degrees Negative Negative   Piriformis test Negative Negative   LALI test Negative Negative   SI distraction Positive Positive   SI compression Positive Positive          MANUAL MUSCLE TEST  Right left   Hip flexion MMT number: 3+/5 MMT strength: 3+/5   Hip abduction MMT strength: 4-/5 MMT strength: 4/5   Knee extension MMT strength: 5/5 MMT strength: 5/5   Knee flexion  MMT strength: 5/5 MMT strength: 5/5   Ankle dorsiflexion MMT strength: 5/5 MMT strength: 5/5   Ankle plantar flexion  MMT strength: 5/5 MMT strength: 5/5   Extensor hallucis longus MMT strength: 5/5 MMT strength: 5/5     ROM/flexibility right left   Hip flexion (120) WFL WFL   Internal rotation (45) WFL WFL   External rotation (45) WFL WFL   Hamstring 90/90 (-10) WFL WFL   Rectus femoris (120) WFL WFL               II. Sitting Tests:    Palpation:     Sitting lordosis: Decreased  Sitting slump test Negative Negative       III. Standing Tests:    Posture:  1. Standing lordosis: WNL  2. Scoliosis: no  3. Lateral shift: right  4. Comments:    SI forward bend  Negative Negative     SPINE AROM:    Thoraco-Lumbar Flexion: WNL  Lumbar Extension: 20  Lumbar Side Bending: right 10 left 20  Trunk rotation: right 60 left 60      IV. Gait assessment:     Step length: WNL   Step width: WNL   Gracy: WNL   Antalgic gait: no  Assistive device: none      Other test/information: tender to palpation at lumbar paraspinals.             Assessment     Pt prognosis is Good.   Pt will benefit from skilled outpatient Physical Therapy to address the deficits stated above and in the chart below, provide pt/family education, and to maximize pt's level of independence.     Plan of care discussed with patient: Yes  Pt's spiritual, cultural and educational needs considered and patient is agreeable to the plan of care and goals as stated below:     Anticipated Barriers for therapy: none      Goals:  Pt in agreement with goals to improve independent functional mobility and activity tolerance.    Short Term Goals: 3 weeks   Pt in agreement with goals for pain management and to improve independent functional mobility and activity tolerance.    1. Patient will correctly demonstrate independent performance of Home Exercise Program in 1 week.  2. Patient will report decreased low back pain to 4/10 for standing, walking and bending activities.  3. Patient will increase lumbar ROM by 10 degrees to improve functional mobility.    Long Term Goals: 5 weeks   1. Patient will increase bilateral hip LE strength to 5/5 to improve functional activity tolerance.  2. Patient will increase abdominal and back extensor strength to 4/5 to improve functional activity mobility.  3. Patient will tolerate 30 minutes or greater of therapeutic exercise with back pain no greater than 2/10 to improve functional activity tolerance.    Plan   Plan of care Certification: 2/22/2022 to 3/31/22    Outpatient Physical Therapy 2 times weekly for 5 weeks to include the following interventions: Electrical Stimulation IFC, Manual  Therapy, Moist Heat/ Ice, Patient Education, Therapeutic Exercise and Ultrasound.     Supervised visit: Case conference with Mellisa Walton PTA and POC reviewed.    Danyel Munoz PT      Physician Signature:________________________________________________      Date:____________________________________________________________

## 2022-03-08 ENCOUNTER — CLINICAL SUPPORT (OUTPATIENT)
Dept: REHABILITATION | Facility: HOSPITAL | Age: 67
End: 2022-03-08
Payer: MEDICARE

## 2022-03-08 DIAGNOSIS — M54.17 LUMBOSACRAL RADICULOPATHY: Chronic | ICD-10-CM

## 2022-03-08 PROCEDURE — 97110 THERAPEUTIC EXERCISES: CPT | Mod: CQ

## 2022-03-08 NOTE — PROGRESS NOTES
Physical Therapy Daily Note     Name: Xavier Rodriguez  [facility] Number: [id number]  Diagnosis:Lumbosacral radiculopathy; Disorder of sacrum   Physician: Samy Recinos PA  Precautions: standard  Visit #: 2 of 11  PTA Visit #: 1  Time In: 0932  Time Out: 1008    Subjective     Pt reports: no pain today.  Pain Scale: Xavier rates pain on a scale of 0-10 to be 0 currently.        Objective     Xavier received individual BLE therapeutic exercises to develop strength, endurance, ROM and flexibility for  36   minutes including:    Sci Fit Stepper: 6 min at 2.0 level  Bridging 1x10 at 10 sec holds  DKTC with ball 1x10 with 5 sec holds  SKTC 1x10 with 5 sec holds  Trunk rotation with lrg ball 2x10  Child's pose 1x10 with 10 sec holds  SLRs 1x10 with 1.5#  Seated fwd lumbar stretch with lrg ball 1x10  Seated BLE marching 2x10 with 1.5#  Seated hip ADD with ball 2x10  Seated hip ABD with GTB 2x10              Written Home Exercises Provided:next visit  Pt demo good/fair understanding of the education provided. Xavier demonstrated good/fair return demonstration of activities.     Education provided re:  Xavier verbalized good and fair understanding of education provided.   No spiritual or educational barriers to learning provided    Assessment     Patient tolerated well, pt required constant PTA tactile cues throughout Tx for pt understanding of proper positioning in order for pt to follow commands.        This is a 66 y.o. female referred to outpatient physical therapy and presents with a medical diagnosis of:Lumbosacral radiculopathy; Disorder of sacrum   and demonstrates limitations as described in the problem list. Pt prognosis is Good. Pt will continue to benefit from skilled outpatient physical therapy to address the deficits listed in the problem list, provide pt/family education and to maximize pt's level of independence in the home and community environment.      Goals as follows:  3 weeks:  Pt in agreement with goals for pain management and to improve independent functional mobility and activity tolerance.     1. Patient will correctly demonstrate independent performance of Home Exercise Program in 1 week.  2. Patient will report decreased low back pain to 4/10 for standing, walking and bending activities.  3. Patient will increase lumbar ROM by 10 degrees to improve functional mobility.     Long Term Goals: 5 weeks   1. Patient will increase bilateral hip LE strength to 5/5 to improve functional activity tolerance.  2. Patient will increase abdominal and back extensor strength to 4/5 to improve functional activity mobility.  3. Patient will tolerate 30 minutes or greater of therapeutic exercise with back pain no greater than 2/10 to improve functional activity tolerance.     Plan     Continue with established Plan of Care towards PT goals.    Therapist: Mellisa Walton, PTA  3/8/2022

## 2022-03-11 ENCOUNTER — CLINICAL SUPPORT (OUTPATIENT)
Dept: REHABILITATION | Facility: HOSPITAL | Age: 67
End: 2022-03-11
Payer: MEDICARE

## 2022-03-11 DIAGNOSIS — M53.3 DISORDER OF SACRUM: Chronic | ICD-10-CM

## 2022-03-11 DIAGNOSIS — M54.17 LUMBOSACRAL RADICULOPATHY: Primary | Chronic | ICD-10-CM

## 2022-03-11 PROCEDURE — 97110 THERAPEUTIC EXERCISES: CPT

## 2022-03-11 NOTE — PROGRESS NOTES
Physical Therapy Daily Note     Name: Xavier Rodriguez  [facility] Number: [id number]  Diagnosis:Lumbosacral radiculopathy; Disorder of sacrum   Physician: Samy Recinos PA  Precautions: standard  Visit #: 2 of 11  PTA Visit #: 1  Time In: 0933  Time Out: 1005    Subjective     Pt reports: back is feeling good and having no back pain.  Pain Scale: Xavier rates pain on a scale of 0-10 to be 0 currently.        Objective     Xavier received individual BLE therapeutic exercises to develop strength, endurance, ROM and flexibility for 32 minutes including:    Sci Fit Stepper: 6 min at 2.0 level  Bridging 2x10 at 10 sec holds  DKTC with ball 2x10 with 5 sec holds  SKTC 1x10 with 5 sec holds  Trunk rotation with lrg ball 2x10  SLRs 2x15 with 2#  Child's pose 1x10 with 10 sec holds  Seated fwd lumbar stretch with lrg ball 1x10  Seated BLE marching 2x15 with 2#  Seated hip ADD with ball 2x15  Seated hip ABD with blue TB 2x15        Measurements:    Thoraco-Lumbar Flexion: WNL  Lumbar Extension: 30  Lumbar Side Bending: right 25left 25  Trunk rotation: right 65 left 65    Written Home Exercises Provided: yes with printed handout.  Pt demo good understanding of the education provided. Xavier demonstrated good/fair return demonstration of activities.     Education provided re:  Xavier  received verbal and tactile cues to facilitate proper execution of exercises and body mechanics.  No spiritual or educational barriers to learning.    Assessment     Patient tolerated well and has no complaints of low back pain. Progressing well with lumbar ROM and core strength.      This is a 66 y.o. female referred to outpatient physical therapy and presents with a medical diagnosis of:Lumbosacral radiculopathy; Disorder of sacrum   and demonstrates limitations as described in the problem list. Pt prognosis is Good. Pt will continue to benefit from skilled outpatient physical therapy to  address the deficits listed in the problem list, provide pt/family education and to maximize pt's level of independence in the home and community environment.     Goals as follows:  3 weeks:  Pt in agreement with goals for pain management and to improve independent functional mobility and activity tolerance.     1. Patient will correctly demonstrate independent performance of Home Exercise Program in 1 week. Goal Met.  2. Patient will report decreased low back pain to 4/10 for standing, walking and bending activities. Goal Met.  3. Patient will increase lumbar ROM by 10 degrees to improve functional mobility. Goal Met.     Long Term Goals: 5 weeks   1. Patient will increase bilateral hip LE strength to 5/5 to improve functional activity tolerance.  2. Patient will increase abdominal and back extensor strength to 4/5 to improve functional activity mobility.  3. Patient will tolerate 30 minutes or greater of therapeutic exercise with back pain no greater than 2/10 to improve functional activity tolerance.     Plan     Continue with established Plan of Care towards PT goals.    Therapist: Danyel Munoz, PT  3/11/2022

## 2022-03-15 ENCOUNTER — CLINICAL SUPPORT (OUTPATIENT)
Dept: REHABILITATION | Facility: HOSPITAL | Age: 67
End: 2022-03-15
Payer: MEDICARE

## 2022-03-15 DIAGNOSIS — M53.3 DISORDER OF SACRUM: Primary | Chronic | ICD-10-CM

## 2022-03-15 DIAGNOSIS — M54.17 LUMBOSACRAL RADICULOPATHY: Chronic | ICD-10-CM

## 2022-03-15 PROCEDURE — 97110 THERAPEUTIC EXERCISES: CPT

## 2022-03-15 NOTE — PROGRESS NOTES
Physical Therapy Daily Note     Name: Xavier Rodriguez  [facility] Number: [id number]  Diagnosis:Lumbosacral radiculopathy; Disorder of sacrum   Physician: Samy Recinos PA  Precautions: standard  Visit #: 4 of 11  PTA Visit #: 1  Time In: 0949  Time Out: 1015    Subjective     Pt reports: no complaints.  Pain Scale: Xavier rates pain on a scale of 0-10 to be 0 currently.        Objective     Xavier received individual BLE therapeutic exercises to develop strength, endurance, ROM and flexibility for 25 minutes including:    Sci Fit Stepper: 6 min at 2.0 level  Bridging 2x15   DKTC with ball 2x15  Lower Trunk rotation with lrg ball 1 x 15  SLRs 2x15 with 2#  Hip abduction side lying 2 x 10 2 lb  Seated BLE marching 2x15 with 2 lb  Seated hip ABD with blue TB 2x15      Measurements:    Thoraco-Lumbar Flexion: WNL  Lumbar Extension: 30  Lumbar Side Bending: right 25left 25  Trunk rotation: right 65 left 65    Written Home Exercises Provided: yes with printed handout.  Pt demo good understanding of the education provided. Xavier demonstrated good/fair return demonstration of activities.     Education provided re:  Xavier  received verbal and tactile cues to facilitate proper execution of exercises and body mechanics.  No spiritual or educational barriers to learning.    Assessment     Patient tolerated well and has no complaints of low back pain. Patient has met all STG's and will progress towards LTG's.      This is a 66 y.o. female referred to outpatient physical therapy and presents with a medical diagnosis of:Lumbosacral radiculopathy; Disorder of sacrum   and demonstrates limitations as described in the problem list. Pt prognosis is Good. Pt will continue to benefit from skilled outpatient physical therapy to address the deficits listed in the problem list, provide pt/family education and to maximize pt's level of independence in the home and community environment.      Goals as follows:  3 weeks:  Pt in agreement with goals for pain management and to improve independent functional mobility and activity tolerance.     1. Patient will correctly demonstrate independent performance of Home Exercise Program in 1 week. Goal Met.  2. Patient will report decreased low back pain to 4/10 for standing, walking and bending activities. Goal Met.  3. Patient will increase lumbar ROM by 10 degrees to improve functional mobility. Goal Met.     Long Term Goals: 5 weeks   1. Patient will increase bilateral hip LE strength to 5/5 to improve functional activity tolerance.  2. Patient will increase abdominal and back extensor strength to 4/5 to improve functional activity mobility.  3. Patient will tolerate 30 minutes or greater of therapeutic exercise with back pain no greater than 2/10 to improve functional activity tolerance.     Plan     Continue with established Plan of Care towards PT goals.    Therapist: Danyel Munoz, PT  3/15/2022

## 2022-03-17 ENCOUNTER — CLINICAL SUPPORT (OUTPATIENT)
Dept: REHABILITATION | Facility: HOSPITAL | Age: 67
End: 2022-03-17
Payer: MEDICARE

## 2022-03-17 DIAGNOSIS — M54.17 LUMBOSACRAL RADICULOPATHY: Chronic | ICD-10-CM

## 2022-03-17 PROCEDURE — 97110 THERAPEUTIC EXERCISES: CPT | Mod: CQ

## 2022-03-17 NOTE — PROGRESS NOTES
Physical Therapy Daily Note     Name: Xavier Rodriguez  [facility] Number: [id number]  Diagnosis:Lumbosacral radiculopathy; Disorder of sacrum   Physician: Samy Recinos PA  Precautions: standard  Visit #: 5 of 11  PTA Visit #: 1  Time In: 0929  Time Out: 0959    Subjective     Pt reports: no complaints.  Pain Scale: Xavier rates pain on a scale of 0-10 to be 0 currently.        Objective     Xavier received individual BLE therapeutic exercises to develop strength, endurance, ROM and flexibility for 30 minutes including:    Sci Fit Stepper: 6 min at 2.3 level  Bridging 2x15 with large ball  DKTC with ball 2x15   Lower Trunk rotation with lrg ball 1 x 15  SLRs 2x15 with 2.5#  Hip abduction side lying 2 x 10 2.5 lb  Seated BLE marching 2x15 with 2.5 lb  Seated hip ABD with blue TB 2x15  Seated BLE HS curls 2x15 BTB      Measurements:    Thoraco-Lumbar Flexion: WNL  Lumbar Extension: 30  Lumbar Side Bending: right 25left 25  Trunk rotation: right 65 left 65    Written Home Exercises Provided: yes with printed handout.  Pt demo good understanding of the education provided. Xavier demonstrated good/fair return demonstration of activities.     Education provided re:  Xavier  received verbal and tactile cues to facilitate proper execution of exercises and body mechanics.  No spiritual or educational barriers to learning.    Assessment     Patient tolerated well and has no complaints of low back pain. Patient has met all STG's and will progress towards LTG's.  Pt progressed to 2.5 CW with BLEs with little to no difficulty.  Therapist provided skilled cues for proper positioning and posture with all Tx.       This is a 66 y.o. female referred to outpatient physical therapy and presents with a medical diagnosis of:Lumbosacral radiculopathy; Disorder of sacrum   and demonstrates limitations as described in the problem list. Pt prognosis is Good. Pt will continue to benefit  from skilled outpatient physical therapy to address the deficits listed in the problem list, provide pt/family education and to maximize pt's level of independence in the home and community environment.     Goals as follows:  3 weeks:  Pt in agreement with goals for pain management and to improve independent functional mobility and activity tolerance.     1. Patient will correctly demonstrate independent performance of Home Exercise Program in 1 week. Goal Met.  2. Patient will report decreased low back pain to 4/10 for standing, walking and bending activities. Goal Met.  3. Patient will increase lumbar ROM by 10 degrees to improve functional mobility. Goal Met.     Long Term Goals: 5 weeks   1. Patient will increase bilateral hip LE strength to 5/5 to improve functional activity tolerance.  2. Patient will increase abdominal and back extensor strength to 4/5 to improve functional activity mobility.  3. Patient will tolerate 30 minutes or greater of therapeutic exercise with back pain no greater than 2/10 to improve functional activity tolerance.     Plan     Continue with established Plan of Care towards PT goals.    Therapist: Mellisa Walton, PTA  3/17/2022

## 2022-03-22 ENCOUNTER — CLINICAL SUPPORT (OUTPATIENT)
Dept: REHABILITATION | Facility: HOSPITAL | Age: 67
End: 2022-03-22
Payer: MEDICARE

## 2022-03-22 DIAGNOSIS — M54.17 LUMBOSACRAL RADICULOPATHY: Chronic | ICD-10-CM

## 2022-03-22 PROCEDURE — 97110 THERAPEUTIC EXERCISES: CPT | Mod: CQ

## 2022-03-22 NOTE — PROGRESS NOTES
Physical Therapy Daily Note     Name: Xavier Rodriguez  [facility] Number: [id number]  Diagnosis:Lumbosacral radiculopathy; Disorder of sacrum   Physician: Samy Recinos PA  Precautions: standard  Visit #: 6 of 11  PTA Visit #: 2  Time In: 0934  Time Out: 1004    Subjective     Pt reports: no complaints.  Pain Scale: Xavier rates pain on a scale of 0-10 to be 0 currently.        Objective     Xavier received individual BLE therapeutic exercises to develop strength, endurance, ROM and flexibility for  30   minutes including:    Sci Fit Stepper: 6 min at 3.0 level  Bridging 2x15 with large ball  DKTC with ball 2x15   Lower Trunk rotation with lrg ball 1 x 15  SLRs 2x15 with 2.5#  Hip abduction side lying 2 x 10 2.5 lb   Seated BLE marching 2x20 with 2.5 lb  Seated hip ABD with blue TB 2x20  Seated BLE HS curls 2x15 BTB  HS stretches 5x 10 sec each      Measurements:    Thoraco-Lumbar Flexion: WNL  Lumbar Extension: 30  Lumbar Side Bending: right 25left 25  Trunk rotation: right 65 left 65    Written Home Exercises Provided: yes with printed handout.  Pt demo good understanding of the education provided. Xavier demonstrated good/fair return demonstration of activities.     Education provided re:  Xavier  received verbal and tactile cues to facilitate proper execution of exercises and body mechanics.  No spiritual or educational barriers to learning.    Assessment     Patient tolerated well and has no complaints of low back pain. Patient has met all STG's and will progress towards LTG's.   Therapist provided skilled cues for proper positioning and posture with all Tx.       This is a 66 y.o. female referred to outpatient physical therapy and presents with a medical diagnosis of:Lumbosacral radiculopathy; Disorder of sacrum   and demonstrates limitations as described in the problem list. Pt prognosis is Good. Pt will continue to benefit from skilled outpatient physical  therapy to address the deficits listed in the problem list, provide pt/family education and to maximize pt's level of independence in the home and community environment.     Goals as follows:  3 weeks:  Pt in agreement with goals for pain management and to improve independent functional mobility and activity tolerance.     1. Patient will correctly demonstrate independent performance of Home Exercise Program in 1 week. Goal Met.  2. Patient will report decreased low back pain to 4/10 for standing, walking and bending activities. Goal Met.  3. Patient will increase lumbar ROM by 10 degrees to improve functional mobility. Goal Met.     Long Term Goals: 5 weeks   1. Patient will increase bilateral hip LE strength to 5/5 to improve functional activity tolerance.  2. Patient will increase abdominal and back extensor strength to 4/5 to improve functional activity mobility.  3. Patient will tolerate 30 minutes or greater of therapeutic exercise with back pain no greater than 2/10 to improve functional activity tolerance.     Plan     Continue with established Plan of Care towards PT goals.    Therapist: Mellisa Walton, PTA  3/22/2022

## 2022-03-23 ENCOUNTER — OFFICE VISIT (OUTPATIENT)
Dept: PRIMARY CARE CLINIC | Facility: CLINIC | Age: 67
End: 2022-03-23
Payer: MEDICARE

## 2022-03-23 VITALS
BODY MASS INDEX: 30.36 KG/M2 | OXYGEN SATURATION: 97 % | HEART RATE: 60 BPM | DIASTOLIC BLOOD PRESSURE: 70 MMHG | WEIGHT: 165 LBS | RESPIRATION RATE: 18 BRPM | SYSTOLIC BLOOD PRESSURE: 114 MMHG | HEIGHT: 62 IN

## 2022-03-23 DIAGNOSIS — E55.9 VITAMIN D DEFICIENCY: ICD-10-CM

## 2022-03-23 DIAGNOSIS — A77.9 SPOTTED FEVER: ICD-10-CM

## 2022-03-23 DIAGNOSIS — K59.00 CONSTIPATION, UNSPECIFIED CONSTIPATION TYPE: Primary | ICD-10-CM

## 2022-03-23 DIAGNOSIS — M54.17 LUMBOSACRAL RADICULOPATHY: Chronic | ICD-10-CM

## 2022-03-23 PROCEDURE — 99212 OFFICE O/P EST SF 10 MIN: CPT | Mod: ,,, | Performed by: FAMILY MEDICINE

## 2022-03-23 PROCEDURE — 99212 PR OFFICE/OUTPT VISIT, EST, LEVL II, 10-19 MIN: ICD-10-PCS | Mod: ,,, | Performed by: FAMILY MEDICINE

## 2022-03-23 NOTE — PROGRESS NOTES
Subjective:      Patient ID: Xavier Rodriguez is a 66 y.o. female.    Chief Complaint: Constipation, Headache, and Establish Care    Xavier Rodriguez a 66 y.o. female presents for follow up on all regular problems which are reviewed and discussed.   Seen several drs for above Iidk what more I can do. Explained to her. She needs  To go back ans see the drs shes already seen  Problem List Items Addressed This Visit        Neuro    Lumbosacral radiculopathy (Chronic)       Endocrine    Vitamin D deficiency (Chronic)       GI    Constipation - Primary    Overview     · History of severe constipation, apparently uncontrolled             Other Visit Diagnoses     Spotted fever              Past Medical History:  Past Medical History:   Diagnosis Date    Chronic low back pain     Depression     Dysfunctional voiding of urine 11/10/2021    Patient states she does not urinate.  She was taken off of lasix which was the only thing that helped her empty. PVR 0 ml Chronic constipation, lifelong.  Has been out of Linzess for a month now. Renal function WNL when last checked in May    Edema     Hypertension     IBS (irritable bowel syndrome)      Past Surgical History:   Procedure Laterality Date    TUMOR REMOVAL       Review of patient's allergies indicates:  No Known Allergies  Current Outpatient Medications on File Prior to Visit   Medication Sig Dispense Refill    ergocalciferol (VITAMIN D2) 50,000 unit Cap Take 1 capsule (50,000 Units total) by mouth every 7 days. for 8 doses 8 capsule 0    HYDROcodone-acetaminophen (NORCO) 7.5-325 mg per tablet Take 1 tablet by mouth once daily. 30 tablet 0    LINZESS 145 mcg Cap capsule Take 1 capsule (145 mcg total) by mouth once daily. 30 capsule 0    naproxen (NAPROSYN) 500 MG tablet TAKE 1 TABLET BY MOUTH EVERY DAY 30 tablet 0    HYDROcodone-acetaminophen (NORCO) 7.5-325 mg per tablet Take 1 tablet by mouth once daily. (Patient not taking: Reported on 3/23/2022) 30 tablet 0     No  "current facility-administered medications on file prior to visit.     Social History     Socioeconomic History    Marital status: Single   Tobacco Use    Smoking status: Never Smoker    Smokeless tobacco: Never Used   Substance and Sexual Activity    Alcohol use: Never    Drug use: Never    Sexual activity: Not Currently     Family History   Problem Relation Age of Onset    Heart disease Mother     Heart attack Mother     Heart disease Father     Heart attack Father        Review of Systems   Constitutional: Negative for activity change and unexpected weight change.   HENT: Negative for hearing loss, rhinorrhea and trouble swallowing.    Eyes: Positive for visual disturbance. Negative for discharge.   Respiratory: Negative for chest tightness and wheezing.    Cardiovascular: Positive for chest pain. Negative for palpitations.   Gastrointestinal: Positive for constipation. Negative for blood in stool, diarrhea and vomiting.   Endocrine: Negative for polydipsia and polyuria.   Genitourinary: Positive for difficulty urinating. Negative for dysuria, hematuria and menstrual problem.   Musculoskeletal: Negative for arthralgias, joint swelling and neck pain.   Neurological: Positive for weakness and headaches.   Psychiatric/Behavioral: Positive for confusion. Negative for dysphoric mood.       Objective:     /70 (BP Location: Right arm, Patient Position: Sitting, BP Method: Large (Manual))   Pulse 60   Resp 18   Ht 5' 2" (1.575 m)   Wt 74.8 kg (165 lb)   SpO2 97%   BMI 30.18 kg/m²     Physical Exam  Constitutional:       Appearance: Normal appearance. She is obese.   HENT:      Head: Normocephalic and atraumatic.      Right Ear: External ear normal.      Left Ear: External ear normal.      Nose: Nose normal.      Mouth/Throat:      Mouth: Mucous membranes are moist.      Pharynx: Oropharynx is clear.   Eyes:      Pupils: Pupils are equal, round, and reactive to light.   Cardiovascular:      Rate and " Rhythm: Normal rate and regular rhythm.      Heart sounds: Normal heart sounds.   Pulmonary:      Effort: Pulmonary effort is normal.      Breath sounds: Normal breath sounds.   Abdominal:      Palpations: Abdomen is soft.   Musculoskeletal:      Cervical back: Normal range of motion and neck supple.   Skin:     General: Skin is warm and dry.   Neurological:      General: No focal deficit present.      Mental Status: She is alert and oriented to person, place, and time. Mental status is at baseline.   Psychiatric:         Mood and Affect: Mood normal.         Behavior: Behavior normal.         Thought Content: Thought content normal.         Judgment: Judgment normal.       Assessment:     1. Constipation, unspecified constipation type    2. Lumbosacral radiculopathy    3. Vitamin D deficiency    4. Spotted fever        Plan:     Problem List Items Addressed This Visit        Neuro    Lumbosacral radiculopathy (Chronic)       Endocrine    Vitamin D deficiency (Chronic)       GI    Constipation - Primary      Other Visit Diagnoses     Spotted fever            No follow-ups on file.  Go see recent drs    I am having Xavier Rodriguez maintain her LINZESS, naproxen, HYDROcodone-acetaminophen, HYDROcodone-acetaminophen, and ergocalciferol.    Xavier was seen today for constipation, headache and establish care.    Diagnoses and all orders for this visit:    Constipation, unspecified constipation type    Lumbosacral radiculopathy  -     Ambulatory referral/consult to Family Practice    Vitamin D deficiency  -     Ambulatory referral/consult to Family Practice    Spotted fever  -     Ambulatory referral/consult to Family Practice         [unfilled]  No orders of the defined types were placed in this encounter.

## 2022-03-29 ENCOUNTER — DOCUMENTATION ONLY (OUTPATIENT)
Dept: REHABILITATION | Facility: HOSPITAL | Age: 67
End: 2022-03-29

## 2022-03-29 ENCOUNTER — CLINICAL SUPPORT (OUTPATIENT)
Dept: REHABILITATION | Facility: HOSPITAL | Age: 67
End: 2022-03-29
Payer: MEDICARE

## 2022-03-29 DIAGNOSIS — M54.17 LUMBOSACRAL RADICULOPATHY: Chronic | ICD-10-CM

## 2022-03-29 DIAGNOSIS — M53.3 DISORDER OF SACRUM: Chronic | ICD-10-CM

## 2022-03-29 PROCEDURE — 97110 THERAPEUTIC EXERCISES: CPT | Mod: CQ

## 2022-03-29 NOTE — PROGRESS NOTES
Physical Therapy Daily Note     Name: Xavier Rodriguez  [facility] Number: [id number]  Diagnosis:Lumbosacral radiculopathy; Disorder of sacrum   Physician: Samy Recinos PA  Precautions: standard  Visit #: 7 of 11  PTA Visit #: 3  Time In: 0932  Time Out: 1005    Subjective     Pt reports: no complaints.  Pain Scale: Xavier rates pain on a scale of 0-10 to be 0 currently.        Objective     Xavier received individual BLE therapeutic exercises to develop strength, endurance, ROM and flexibility for  33  minutes including:    Sci Fit Stepper: 6 min at 3.2 level  Bridging 2x15 with large ball  DKTC with ball 2x15   Lower Trunk rotation with lrg ball 1 x 15  SLRs 2x15 with 3.0#  Hip abduction side lying 2 x 10 2.5 lb (not this visit)  Seated BLE marching 2x20 with 3.0 lb  Seated hip ABD with blue TB 2x20  Seated BLE HS curls 2x20 BTB  HS stretches 5x 10 sec each      Measurements:    Thoraco-Lumbar Flexion: WNL  Lumbar Extension: 30  Lumbar Side Bending: right 25left 25  Trunk rotation: right 65 left 65    Written Home Exercises Provided: yes with printed handout.  Pt demo good understanding of the education provided. Xavier demonstrated good/fair return demonstration of activities.     Education provided re:  Xavier  received verbal and tactile cues to facilitate proper execution of exercises and body mechanics.  No spiritual or educational barriers to learning.    Assessment     Patient tolerated well and has no complaints of low back pain. Patient has met all STG's and progressing towards LTG's. Pt progressed to 3# CW with exercise today with little to no difficulty.   Therapist provided skilled cues for proper positioning and posture with all Tx. Measurements taken today as follows:    MANUAL MUSCLE TEST  Right left   Hip flexion MMT number: 4/5 MMT strength: 4/5   Hip abduction MMT strength: 5/5 MMT strength: 5/5   Knee extension MMT strength: 5/5 MMT strength:  5/5   Knee flexion  MMT strength: 5/5 MMT strength: 5/5     Abdominal strength: 4/5  Back extensor strength: 4/5    This is a 66 y.o. female referred to outpatient physical therapy and presents with a medical diagnosis of:Lumbosacral radiculopathy; Disorder of sacrum   and demonstrates limitations as described in the problem list. Pt prognosis is Good. Pt will continue to benefit from skilled outpatient physical therapy to address the deficits listed in the problem list, provide pt/family education and to maximize pt's level of independence in the home and community environment.     Goals as follows:  3 weeks:  Pt in agreement with goals for pain management and to improve independent functional mobility and activity tolerance.     1. Patient will correctly demonstrate independent performance of Home Exercise Program in 1 week. Goal Met.  2. Patient will report decreased low back pain to 4/10 for standing, walking and bending activities. Goal Met.  3. Patient will increase lumbar ROM by 10 degrees to improve functional mobility. Goal Met.     Long Term Goals: 5 weeks   1. Patient will increase bilateral hip LE strength to 5/5 to improve functional activity tolerance.  2. Patient will increase abdominal and back extensor strength to 4/5 to improve functional activity mobility.  3. Patient will tolerate 30 minutes or greater of therapeutic exercise with back pain no greater than 2/10 to improve functional activity tolerance. Goal Met     Plan     Continue with established Plan of Care towards PT goals.    Therapist: Mellisa Walton, PTA  3/29/2022

## 2022-03-29 NOTE — PROGRESS NOTES
PT Progress Report    Name: Xavier [facility] Number: [id number]  Diagnosis:Lumbosacral radiculopathy; Disorder of sacrum   Physician: Samy Recinos PA  Precautions: standard  Visit #: 7 of 11    Subjective:   Xavier reports: no back pain at this time. Pt is participating well in therapy and progressing towards established goals.         Objective:    Thoraco-Lumbar Flexion: WNL  Lumbar Extension: 30  Lumbar Side Bending: right 25left 25  Trunk rotation: right 65 left 65    MANUAL MUSCLE TEST  Right left   Hip flexion MMT number: 4/5 MMT strength: 4/5   Hip abduction MMT strength: 5/5 MMT strength: 5/5   Knee extension MMT strength: 5/5 MMT strength: 5/5   Knee flexion  MMT strength: 5/5 MMT strength: 5/5      Abdominal strength: 4/5  Back extensor strength: 4/5         Assessment:  Patient tolerated well and has no complaints of low back pain. Patient has met all STG's and progressing towards LTG's.       Goals  3 weeks:  Pt in agreement with goals for pain management and to improve independent functional mobility and activity tolerance.     1. Patient will correctly demonstrate independent performance of Home Exercise Program in 1 week. Goal Met.  2. Patient will report decreased low back pain to 4/10 for standing, walking and bending activities. Goal Met.  3. Patient will increase lumbar ROM by 10 degrees to improve functional mobility. Goal Met.     Long Term Goals: 5 weeks   1. Patient will increase bilateral hip LE strength to 5/5 to improve functional activity tolerance.  2. Patient will increase abdominal and back extensor strength to 4/5 to improve functional activity mobility. Goal Met.  3. Patient will tolerate 30 minutes or greater of therapeutic exercise with back pain no greater than 2/10 to improve functional activity. Goal Met.        Danyel Munoz, PT

## 2022-03-31 ENCOUNTER — CLINICAL SUPPORT (OUTPATIENT)
Dept: REHABILITATION | Facility: HOSPITAL | Age: 67
End: 2022-03-31
Payer: MEDICARE

## 2022-03-31 DIAGNOSIS — M53.3 DISORDER OF SACRUM: Chronic | ICD-10-CM

## 2022-03-31 PROCEDURE — 97110 THERAPEUTIC EXERCISES: CPT | Mod: CQ

## 2022-03-31 NOTE — PROGRESS NOTES
Physical Therapy Daily Note     Name: Xavier Rodriguez  [facility] Number: [id number]  Diagnosis:Lumbosacral radiculopathy; Disorder of sacrum   Physician: Samy Recinos PA  Precautions: standard  Visit #: 8 of 11  PTA Visit #: 4  Time In: 0932  Time Out: 1000    Subjective     Pt reports: no complaints.  Pain Scale: Xavier rates pain on a scale of 0-10 to be 0 currently.        Objective     Xavier received individual BLE therapeutic exercises to develop strength, endurance, ROM and flexibility for  28  minutes including:    Sci Fit Stepper: 6 min at 3.5 level  Bridging 2x15 with large ball  DKTC with ball 2x15   Lower Trunk rotation with lrg ball 2x10  SLRs 2x15 with 3.0#  Hip abduction side lying 2 x 10 2.5 lb (not this visit)  Seated BLE marching 2x20 with 3.0 lb  Seated hip ABD with blue TB 2x20  Seated BLE HS curls 2x20 BTB  HS stretches 10 x 10 sec each      Measurements:    Thoraco-Lumbar Flexion: WNL  Lumbar Extension: 30  Lumbar Side Bending: right 25left 25  Trunk rotation: right 65 left 65    Written Home Exercises Provided: yes with printed handout.  Pt demo good understanding of the education provided. Xavier demonstrated good/fair return demonstration of activities.     Education provided re:  Xavier  received verbal and tactile cues to facilitate proper execution of exercises and body mechanics.  No spiritual or educational barriers to learning.    Assessment     Patient tolerated well and has no complaints of low back pain. Patient has met all STG's and progressing towards LTG's. Pt progressed to 3# CW with exercise today with little to no difficulty.   Therapist provided skilled cues for proper positioning and posture with all Tx. Measurements taken today as follows:    MANUAL MUSCLE TEST  Right left   Hip flexion MMT number: 4/5 MMT strength: 4/5   Hip abduction MMT strength: 5/5 MMT strength: 5/5   Knee extension MMT strength: 5/5 MMT strength:  5/5   Knee flexion  MMT strength: 5/5 MMT strength: 5/5     Abdominal strength: 4/5  Back extensor strength: 4/5    This is a 66 y.o. female referred to outpatient physical therapy and presents with a medical diagnosis of:Lumbosacral radiculopathy; Disorder of sacrum   and demonstrates limitations as described in the problem list. Pt prognosis is Good. Pt will continue to benefit from skilled outpatient physical therapy to address the deficits listed in the problem list, provide pt/family education and to maximize pt's level of independence in the home and community environment.     Goals as follows:  3 weeks:  Pt in agreement with goals for pain management and to improve independent functional mobility and activity tolerance.     1. Patient will correctly demonstrate independent performance of Home Exercise Program in 1 week. Goal Met.  2. Patient will report decreased low back pain to 4/10 for standing, walking and bending activities. Goal Met.  3. Patient will increase lumbar ROM by 10 degrees to improve functional mobility. Goal Met.     Long Term Goals: 5 weeks   1. Patient will increase bilateral hip LE strength to 5/5 to improve functional activity tolerance.Goal Met  2. Patient will increase abdominal and back extensor strength to 4/5 to improve functional activity mobility. Goal Met  3. Patient will tolerate 30 minutes or greater of therapeutic exercise with back pain no greater than 2/10 to improve functional activity tolerance. Goal Met     Plan     Continue with established Plan of Care towards PT goals.    Therapist: Mellisa Walton, MAHENDRA  3/31/2022

## 2022-04-01 PROBLEM — M53.3 DISORDER OF SACRUM: Chronic | Status: RESOLVED | Noted: 2021-11-10 | Resolved: 2022-04-01

## 2022-04-01 PROBLEM — M54.17 LUMBOSACRAL RADICULOPATHY: Chronic | Status: RESOLVED | Noted: 2022-02-11 | Resolved: 2022-04-01

## 2022-04-01 NOTE — PROGRESS NOTES
Outpatient Therapy Discharge Summary     Name: Xavier Rodriguez  Clinic Number: [id number]  Diagnosis:Lumbosacral radiculopathy; Disorder of sacrum   Physician: Samy Recinos PA  Precautions: standard  Evaluation Date: 2/22/22    Date of Last visit: 3/31/22  Total Visits Received: 8        Objective Measurements    MANUAL MUSCLE TEST  Right left   Hip flexion MMT number: 4/5 MMT strength: 4/5   Hip abduction MMT strength: 5/5 MMT strength: 5/5   Knee extension MMT strength: 5/5 MMT strength: 5/5   Knee flexion  MMT strength: 5/5 MMT strength: 5/5      Abdominal strength: 4/5  Back extensor strength: 4/5  Measurements:     Thoraco-Lumbar Flexion: WNL  Lumbar Extension: 30  Lumbar Side Bending: right 25left 25  Trunk rotation: right 65 left 65           Assessment    Xavier Rodriguez progressed well with therapy with resolved back pain. Pt has met all established goals.    Goals:  3 weeks:  Pt in agreement with goals for pain management and to improve independent functional mobility and activity tolerance.     1. Patient will correctly demonstrate independent performance of Home Exercise Program in 1 week. Goal Met.  2. Patient will report decreased low back pain to 4/10 for standing, walking and bending activities. Goal Met.  3. Patient will increase lumbar ROM by 10 degrees to improve functional mobility. Goal Met.     Long Term Goals: 5 weeks   1. Patient will increase bilateral hip LE strength to 5/5 to improve functional activity tolerance.Goal Met  2. Patient will increase abdominal and back extensor strength to 4/5 to improve functional activity mobility. Goal Met  3. Patient will tolerate 30 minutes or greater of therapeutic exercise with back pain no greater than 2/10 to improve functional activity tolerance. Goal Met      Discharge reason: Patient has met all of his/her goals    Plan   This patient is discharged from Physical Therapy.      Danyel Munoz, PT

## 2022-04-18 ENCOUNTER — OFFICE VISIT (OUTPATIENT)
Dept: PAIN MEDICINE | Facility: CLINIC | Age: 67
End: 2022-04-18
Payer: MEDICARE

## 2022-04-18 VITALS
DIASTOLIC BLOOD PRESSURE: 77 MMHG | HEIGHT: 62 IN | WEIGHT: 167 LBS | SYSTOLIC BLOOD PRESSURE: 124 MMHG | HEART RATE: 64 BPM | RESPIRATION RATE: 18 BRPM | BODY MASS INDEX: 30.73 KG/M2

## 2022-04-18 DIAGNOSIS — M53.3 DISORDER OF SACRUM: Chronic | ICD-10-CM

## 2022-04-18 DIAGNOSIS — M54.17 LUMBOSACRAL RADICULOPATHY: Chronic | ICD-10-CM

## 2022-04-18 PROCEDURE — 99214 OFFICE O/P EST MOD 30 MIN: CPT | Mod: S$PBB,,, | Performed by: PHYSICIAN ASSISTANT

## 2022-04-18 PROCEDURE — 99214 PR OFFICE/OUTPT VISIT, EST, LEVL IV, 30-39 MIN: ICD-10-PCS | Mod: S$PBB,,, | Performed by: PHYSICIAN ASSISTANT

## 2022-04-18 PROCEDURE — 99213 OFFICE O/P EST LOW 20 MIN: CPT | Mod: PBBFAC | Performed by: PHYSICIAN ASSISTANT

## 2022-04-18 RX ORDER — HYDROCODONE BITARTRATE AND ACETAMINOPHEN 7.5; 325 MG/1; MG/1
1 TABLET ORAL DAILY
Qty: 30 TABLET | Refills: 0 | Status: SHIPPED | OUTPATIENT
Start: 2022-04-23 | End: 2022-05-23 | Stop reason: SDUPTHER

## 2022-04-18 RX ORDER — HYDROCODONE BITARTRATE AND ACETAMINOPHEN 7.5; 325 MG/1; MG/1
1 TABLET ORAL DAILY
Qty: 30 TABLET | Refills: 0 | Status: SHIPPED | OUTPATIENT
Start: 2022-05-23 | End: 2022-04-18

## 2022-04-18 NOTE — PROGRESS NOTES
Disclaimer:  This note has been generated using voice recognition software.  There may be type of graft focal areas that have been missed during a proof reading      Subjective:      Patient ID: Xavier Rodriguez is a 66 y.o. female.    Chief Complaint: Headache, Low-back Pain, and Leg Pain (bilateral)      Headache   This is a chronic problem. The current episode started more than 1 year ago. The problem occurs daily. The problem has been unchanged. Associated symptoms include back pain and neck pain. Pertinent negatives include no coughing, dizziness, ear pain, eye pain, eye redness, fever, photophobia, seizures, sore throat or vomiting.   Low-back Pain  Associated symptoms include headaches and leg pain. Pertinent negatives include no bladder incontinence, chest pain, dysuria or fever.   Leg Pain   Pertinent negatives include no fever.   Pain  This is a chronic problem. The current episode started more than 1 year ago. The problem occurs daily. The problem has been unchanged. Associated symptoms include arthralgias, headaches and neck pain. Pertinent negatives include no change in bowel habit, chest pain, chills, coughing, fever, rash, sore throat, vertigo or vomiting.     Review of Systems   Constitutional: Negative for appetite change, chills, fever and unexpected weight change.   HENT: Negative for drooling, ear discharge, ear pain, facial swelling, mouth dryness, nosebleeds, sore throat, trouble swallowing, voice change and goiter.    Eyes: Negative for photophobia, pain, discharge, redness and visual disturbance.   Respiratory: Negative for apnea, cough, choking, chest tightness, shortness of breath, wheezing and stridor.    Cardiovascular: Negative for chest pain, palpitations and leg swelling.   Gastrointestinal: Negative for abdominal distention, change in bowel habit, diarrhea, rectal pain, vomiting, fecal incontinence and change in bowel habit.   Endocrine: Negative for cold intolerance, heat intolerance,  "polydipsia, polyphagia and polyuria.   Genitourinary: Negative for bladder incontinence, dysuria, flank pain, frequency and hot flashes.   Musculoskeletal: Positive for arthralgias, back pain, leg pain and neck pain.   Integumentary:  Negative for color change, pallor and rash.   Allergic/Immunologic: Negative for immunocompromised state.   Neurological: Positive for headaches. Negative for dizziness, vertigo, seizures, syncope, facial asymmetry, speech difficulty, light-headedness, disturbances in coordination, memory loss and coordination difficulties.   Hematological: Negative for adenopathy. Does not bruise/bleed easily.   Psychiatric/Behavioral: Negative for agitation, behavioral problems, confusion, decreased concentration, dysphoric mood, hallucinations, self-injury and suicidal ideas. The patient is not nervous/anxious and is not hyperactive.             Objective:  Vitals:    04/18/22 0942   BP: 124/77   Pulse: 64   Resp: 18   Weight: 75.8 kg (167 lb)   Height: 5' 2" (1.575 m)   PainSc: 10-Worst pain ever         Physical Exam  Vitals and nursing note reviewed. Exam conducted with a chaperone present.   Constitutional:       General: She is awake. She is not in acute distress.     Appearance: Normal appearance. She is not toxic-appearing.   HENT:      Head: Normocephalic and atraumatic.      Nose: Nose normal.      Mouth/Throat:      Mouth: Mucous membranes are moist.      Pharynx: Oropharynx is clear.   Eyes:      Conjunctiva/sclera: Conjunctivae normal.      Pupils: Pupils are equal, round, and reactive to light.   Cardiovascular:      Rate and Rhythm: Normal rate.   Pulmonary:      Effort: Pulmonary effort is normal. No respiratory distress.   Abdominal:      Palpations: Abdomen is soft.      Tenderness: There is no guarding.   Musculoskeletal:         General: Normal range of motion.      Cervical back: Normal range of motion and neck supple. No rigidity.      Thoracic back: Tenderness present.      " Lumbar back: Tenderness present.   Skin:     General: Skin is warm and dry.      Coloration: Skin is not jaundiced or pale.   Neurological:      General: No focal deficit present.      Mental Status: She is alert and oriented to person, place, and time. Mental status is at baseline.      Cranial Nerves: Cranial nerves are intact. No cranial nerve deficit (II-XII).   Psychiatric:         Mood and Affect: Mood normal.         Behavior: Behavior normal. Behavior is cooperative.         Thought Content: Thought content normal.           X-Ray Abdomen AP 1 View (KUB)  Narrative: EXAMINATION:  XR ABDOMEN AP 1 VIEW    CLINICAL HISTORY:  Constipation, unspecified    TECHNIQUE:  XR ABDOMEN AP 1 VIEW    COMPARISON:  Comparisons were reviewed, if available.    FINDINGS:  Lower lobes are clear    There is no bowel obstruction.  No free air.  No obvious renal calculi.  Mild colonic stool.    No acute bone findings.  Impression: As above    Electronically signed by: Wilmar Kelly  Date:    11/19/2021  Time:    20:32       Office Visit on 02/16/2022   Component Date Value Ref Range Status    POC Amphetamines 02/16/2022 Negative  Negative, Inconclusive Final    POC Barbiturates 02/16/2022 Negative  Negative, Inconclusive Final    POC Benzodiazepines 02/16/2022 Negative  Negative, Inconclusive Final    POC Cocaine 02/16/2022 Negative  Negative, Inconclusive Final    POC THC 02/16/2022 Negative  Negative, Inconclusive Final    POC Methadone 02/16/2022 Negative  Negative, Inconclusive Final    POC Methamphetamine 02/16/2022 Negative  Negative, Inconclusive Final    POC Opiates 02/16/2022 Negative  Negative, Inconclusive Final    POC Oxycodone 02/16/2022 Negative  Negative, Inconclusive Final    POC Phencyclidine 02/16/2022 Negative  Negative, Inconclusive Final    POC Methylenedioxymethamphetamine * 02/16/2022 Negative  Negative, Inconclusive Final    POC Tricyclic Antidepressants 02/16/2022 Negative  Negative,  Inconclusive Final    POC Buprenorphine 02/16/2022 Negative   Final     Acceptable 02/16/2022 Yes   Final    POC Temperature (Urine) 02/16/2022 94   Final   Lab Visit on 11/11/2021   Component Date Value Ref Range Status    Sodium 11/11/2021 140  136 - 145 mmol/L Final    Potassium 11/11/2021 3.4 (A) 3.5 - 5.1 mmol/L Final    Chloride 11/11/2021 100  98 - 107 mmol/L Final    CO2 11/11/2021 32  21 - 32 mmol/L Final    Anion Gap 11/11/2021 11  7 - 16 mmol/L Final    Glucose 11/11/2021 109 (A) 74 - 106 mg/dL Final    BUN 11/11/2021 12  7 - 18 mg/dL Final    Creatinine 11/11/2021 0.99  0.55 - 1.02 mg/dL Final    BUN/Creatinine Ratio 11/11/2021 12  6 - 20 Final    Calcium 11/11/2021 9.3  8.5 - 10.1 mg/dL Final    Total Protein 11/11/2021 8.0  6.4 - 8.2 g/dL Final    Albumin 11/11/2021 4.4  3.5 - 5.0 g/dL Final    Globulin 11/11/2021 3.6  2.0 - 4.0 g/dL Final    A/G Ratio 11/11/2021 1.2   Final    Bilirubin, Total 11/11/2021 1.5 (A) 0.0 - 1.2 mg/dL Final    Alk Phos 11/11/2021 91  55 - 142 U/L Final    ALT 11/11/2021 16  13 - 56 U/L Final    AST 11/11/2021 18  15 - 37 U/L Final    eGFR  11/11/2021 72  >=60 mL/min/1.73m² Final    RA Titer 11/11/2021 <10  0 - 15 IU/mL Final    CRP 11/11/2021 <0.29  0.00 - 0.80 mg/dL Final    ESR Westergren 11/11/2021 22  0 - 30 mm/Hr Final    Syphilis Ab Interpretation 11/11/2021 Non-Reactive  Non-Reactive Final    Vitamin D 25-Hydroxy, Blood 11/11/2021 9.5  ng/mL Final    Hepatitis C Ab 11/11/2021 Non-Reactive  Non-Reactive Final    Uric Acid 11/11/2021 5.8  2.6 - 6.0 mg/dL Final    Antistreptolysin O (ASO) 11/11/2021 <13.0  0.0 - 408.0 IU/mL Final    Anti-dsDNA 11/11/2021 Negative  Negative Final    Anti-DSDNA (IU) 11/11/2021 6  0 - 24 IU Final    Anaplasma phagocytophilum Ab, IgG,S 11/11/2021 <1:64  <1:64 titer Final    Ehrlichia Chaffeensis (HME) Ab, IgG 11/11/2021 <1:64  <1:64 titer Final    Spotted Fever Group Ab, IgG,  S 11/11/2021 1:64 (A) <1:64 Final    Spotted Fever Group Ab, IgM, S 11/11/2021 <1:64  <1:64 Final    Lyme IgG/IgM 11/11/2021 Non-Reactive  Non-Reactive Final    WBC 11/11/2021 5.59  4.50 - 11.00 K/uL Final    RBC 11/11/2021 4.52  4.20 - 5.40 M/uL Final    Hemoglobin 11/11/2021 13.3  12.0 - 16.0 g/dL Final    Hematocrit 11/11/2021 40.3  38.0 - 47.0 % Final    MCV 11/11/2021 89.2  80.0 - 96.0 fL Final    MCH 11/11/2021 29.4  27.0 - 31.0 pg Final    MCHC 11/11/2021 33.0  32.0 - 36.0 g/dL Final    RDW 11/11/2021 14.6 (A) 11.5 - 14.5 % Final    Platelet Count 11/11/2021 398  150 - 400 K/uL Final    MPV 11/11/2021 10.4  9.4 - 12.4 fL Final    Neutrophils % 11/11/2021 54.8  53.0 - 65.0 % Final    Lymphocytes % 11/11/2021 40.1  27.0 - 41.0 % Final    Monocytes % 11/11/2021 4.5  2.0 - 6.0 % Final    Eosinophils % 11/11/2021 0.2 (A) 1.0 - 4.0 % Final    Basophils % 11/11/2021 0.2  0.0 - 1.0 % Final    Immature Granulocytes % 11/11/2021 0.2  0.0 - 0.4 % Final    nRBC, Auto 11/11/2021 0.0  <=0.0 % Final    Neutrophils, Abs 11/11/2021 3.07  1.80 - 7.70 K/uL Final    Lymphocytes, Absolute 11/11/2021 2.24  1.00 - 4.80 K/uL Final    Monocytes, Absolute 11/11/2021 0.25  0.00 - 0.80 K/uL Final    Eosinophils, Absolute 11/11/2021 0.01  0.00 - 0.50 K/uL Final    Basophils, Absolute 11/11/2021 0.01  0.00 - 0.20 K/uL Final    Immature Granulocytes, Absolute 11/11/2021 0.01  0.00 - 0.04 K/uL Final    nRBC, Absolute 11/11/2021 0.00  <=0.00 x10e3/uL Final    Diff Type 11/11/2021 Auto   Final           Assessment:      1. Disorder of sacrum    2. Lumbosacral radiculopathy          No orders of the defined types were placed in this encounter.        A's of Opioid Risk Assessment  Activity:Patient can perform ADL.   Analgesia:Patients pain is partially controlled by current medication. Patient has tried OTC medications such as Tylenol and Ibuprofen with out relief.   Adverse Effects: Patient denies constipation or  sedation.  Aberrant Behavior:  reviewed with no aberrant drug seeking/taking behavior.  Overdose reversal drug naloxone discussed    Drug screen reviewed      Requested Prescriptions     Signed Prescriptions Disp Refills    HYDROcodone-acetaminophen (NORCO) 7.5-325 mg per tablet 30 tablet 0     Sig: Take 1 tablet by mouth once daily.         Plan:    Recheck vitamin-D November 2022  She completed vitamin-D replacement as directed    She discussed spotted fever results with her primary care provider    She would like to continue with conservative management    Continue current medication    Continue home exercise program as directed    Follow-up 1 month, definitive drug screen    Dr. Mendez, October 2022    Bring original prescription medication bottles/container/box with labels to each visit

## 2022-05-02 ENCOUNTER — HOSPITAL ENCOUNTER (EMERGENCY)
Facility: HOSPITAL | Age: 67
Discharge: HOME | End: 2022-05-02
Attending: FAMILY MEDICINE | Admitting: FAMILY MEDICINE
Payer: COMMERCIAL

## 2022-05-02 VITALS
WEIGHT: 140 LBS | HEART RATE: 73 BPM | BODY MASS INDEX: 25.76 KG/M2 | HEIGHT: 62 IN | DIASTOLIC BLOOD PRESSURE: 60 MMHG | OXYGEN SATURATION: 98 % | SYSTOLIC BLOOD PRESSURE: 101 MMHG | RESPIRATION RATE: 18 BRPM | TEMPERATURE: 98 F

## 2022-05-02 DIAGNOSIS — E87.6 HYPOKALEMIA: Primary | ICD-10-CM

## 2022-05-02 DIAGNOSIS — M94.0 COSTOCHONDRITIS: ICD-10-CM

## 2022-05-02 DIAGNOSIS — R07.9 CHEST PAIN: ICD-10-CM

## 2022-05-02 LAB
ALBUMIN SERPL BCP-MCNC: 4.1 G/DL (ref 3.5–5)
ALBUMIN/GLOB SERPL: 1 {RATIO}
ALP SERPL-CCNC: 100 U/L (ref 55–142)
ALT SERPL W P-5'-P-CCNC: 18 U/L (ref 13–56)
ANION GAP SERPL CALCULATED.3IONS-SCNC: 12 MMOL/L (ref 7–16)
AST SERPL W P-5'-P-CCNC: 12 U/L (ref 15–37)
BASOPHILS # BLD AUTO: 0.02 K/UL (ref 0–0.2)
BASOPHILS NFR BLD AUTO: 0.5 % (ref 0–1)
BILIRUB SERPL-MCNC: 1.4 MG/DL (ref 0–1.2)
BUN SERPL-MCNC: 9 MG/DL (ref 7–18)
BUN/CREAT SERPL: 10 (ref 6–20)
CALCIUM SERPL-MCNC: 7.8 MG/DL (ref 8.5–10.1)
CHLORIDE SERPL-SCNC: 97 MMOL/L (ref 98–107)
CO2 SERPL-SCNC: 33 MMOL/L (ref 21–32)
CREAT SERPL-MCNC: 0.88 MG/DL (ref 0.55–1.02)
DIFFERENTIAL METHOD BLD: ABNORMAL
EOSINOPHIL # BLD AUTO: 0.01 K/UL (ref 0–0.5)
EOSINOPHIL NFR BLD AUTO: 0.2 % (ref 1–4)
ERYTHROCYTE [DISTWIDTH] IN BLOOD BY AUTOMATED COUNT: 14.3 % (ref 11.5–14.5)
GLOBULIN SER-MCNC: 4 G/DL (ref 2–4)
GLUCOSE SERPL-MCNC: 136 MG/DL (ref 74–106)
HCT VFR BLD AUTO: 37.9 % (ref 38–47)
HGB BLD-MCNC: 12.5 G/DL (ref 12–16)
LYMPHOCYTES # BLD AUTO: 1.82 K/UL (ref 1–4.8)
LYMPHOCYTES NFR BLD AUTO: 41.2 % (ref 27–41)
MCH RBC QN AUTO: 29.3 PG (ref 27–31)
MCHC RBC AUTO-ENTMCNC: 33 G/DL (ref 32–36)
MCV RBC AUTO: 88.8 FL (ref 80–96)
MONOCYTES # BLD AUTO: 0.17 K/UL (ref 0–0.8)
MONOCYTES NFR BLD AUTO: 3.8 % (ref 2–6)
MPC BLD CALC-MCNC: 9.9 FL (ref 9.4–12.4)
NEUTROPHILS # BLD AUTO: 2.4 K/UL (ref 1.8–7.7)
NEUTROPHILS NFR BLD AUTO: 54.3 % (ref 53–65)
PLATELET # BLD AUTO: 374 K/UL (ref 150–400)
POTASSIUM SERPL-SCNC: 2.1 MMOL/L (ref 3.5–5.1)
PROT SERPL-MCNC: 8.1 G/DL (ref 6.4–8.2)
RBC # BLD AUTO: 4.27 M/UL (ref 4.2–5.4)
SODIUM SERPL-SCNC: 140 MMOL/L (ref 136–145)
TROPONIN I SERPL HS-MCNC: 6.9 PG/ML
WBC # BLD AUTO: 4.42 K/UL (ref 4.5–11)

## 2022-05-02 PROCEDURE — 99283 EMERGENCY DEPT VISIT LOW MDM: CPT

## 2022-05-02 PROCEDURE — 36415 COLL VENOUS BLD VENIPUNCTURE: CPT | Performed by: FAMILY MEDICINE

## 2022-05-02 PROCEDURE — 93010 ELECTROCARDIOGRAM REPORT: CPT | Performed by: FAMILY MEDICINE

## 2022-05-02 PROCEDURE — 93005 ELECTROCARDIOGRAM TRACING: CPT

## 2022-05-02 PROCEDURE — 84484 ASSAY OF TROPONIN QUANT: CPT | Performed by: FAMILY MEDICINE

## 2022-05-02 PROCEDURE — 85025 COMPLETE CBC W/AUTO DIFF WBC: CPT | Performed by: FAMILY MEDICINE

## 2022-05-02 PROCEDURE — 99283 EMERGENCY DEPT VISIT LOW MDM: CPT | Performed by: FAMILY MEDICINE

## 2022-05-02 PROCEDURE — 80053 COMPREHEN METABOLIC PANEL: CPT | Performed by: FAMILY MEDICINE

## 2022-05-02 PROCEDURE — 25000003 PHARM REV CODE 250: Performed by: FAMILY MEDICINE

## 2022-05-02 RX ORDER — LOVASTATIN 20 MG/1
20 TABLET ORAL
COMMUNITY
End: 2022-12-05 | Stop reason: SDUPTHER

## 2022-05-02 RX ORDER — TOPIRAMATE SPINKLE 25 MG/1
25 CAPSULE ORAL
COMMUNITY

## 2022-05-02 RX ORDER — FUROSEMIDE 40 MG/1
40 TABLET ORAL
COMMUNITY
End: 2023-04-12

## 2022-05-02 RX ORDER — AMITRIPTYLINE HYDROCHLORIDE 25 MG/1
25 TABLET, FILM COATED ORAL
COMMUNITY

## 2022-05-02 RX ORDER — NAPROXEN 375 MG/1
375 TABLET ORAL 2 TIMES DAILY WITH MEALS
Qty: 60 TABLET | Refills: 0 | Status: SHIPPED | OUTPATIENT
Start: 2022-05-02 | End: 2022-12-05

## 2022-05-02 RX ORDER — TAMSULOSIN HYDROCHLORIDE 0.4 MG/1
CAPSULE ORAL
COMMUNITY
Start: 2022-04-07 | End: 2023-05-15 | Stop reason: SDUPTHER

## 2022-05-02 RX ADMIN — POTASSIUM BICARBONATE 60 MEQ: 391 TABLET, EFFERVESCENT ORAL at 11:05

## 2022-05-02 NOTE — ED NOTES
"Pt states "she feels a lot better." NSR per monitor. No acute distress noted. Family at bedside to take pt home.   "

## 2022-05-02 NOTE — ED PROVIDER NOTES
Encounter Date: 5/2/2022       History     Chief Complaint   Patient presents with    Chest Pain     C/o chest pain without radiation that started last night. Denies SOB. Points to area right under left breast.      Patient comes in with chest pain.  Ongoing since last night.  No shortness of breath no falls no trauma.  No fever no nausea vomiting diarrhea.  Pulse rate has been 101.  Neck and no fever as mentioned above.  Blood pressure stable.        Review of patient's allergies indicates:  No Known Allergies  Past Medical History:   Diagnosis Date    Chronic low back pain     Depression     Dysfunctional voiding of urine 11/10/2021    Patient states she does not urinate.  She was taken off of lasix which was the only thing that helped her empty. PVR 0 ml Chronic constipation, lifelong.  Has been out of Linzess for a month now. Renal function WNL when last checked in May    Edema     Hypertension     IBS (irritable bowel syndrome)      Past Surgical History:   Procedure Laterality Date    TUMOR REMOVAL       Family History   Problem Relation Age of Onset    Heart disease Mother     Heart attack Mother     Heart disease Father     Heart attack Father      Social History     Tobacco Use    Smoking status: Never Smoker    Smokeless tobacco: Never Used   Substance Use Topics    Alcohol use: Never    Drug use: Never     Review of Systems   Constitutional: Negative.  Negative for fever.   HENT: Negative.  Negative for sore throat.    Eyes: Negative.    Respiratory: Negative.  Negative for shortness of breath.    Cardiovascular: Positive for chest pain.   Gastrointestinal: Negative.  Negative for nausea.   Endocrine: Negative.    Genitourinary: Negative.  Negative for dysuria.   Musculoskeletal: Negative.  Negative for back pain.   Skin: Negative.  Negative for rash.   Allergic/Immunologic: Negative.    Neurological: Negative.  Negative for weakness.   Hematological: Negative.  Does not bruise/bleed  easily.   Psychiatric/Behavioral: Negative.    All other systems reviewed and are negative.      Physical Exam     Initial Vitals [05/02/22 0959]   BP Pulse Resp Temp SpO2   125/63 101 18 97.9 °F (36.6 °C) 98 %      MAP       --         Physical Exam    Constitutional: She appears well-developed and well-nourished.   HENT:   Head: Normocephalic and atraumatic.   Right Ear: External ear normal.   Left Ear: External ear normal.   Nose: Nose normal.   Mouth/Throat: Oropharynx is clear and moist.   Eyes: Conjunctivae and EOM are normal. Pupils are equal, round, and reactive to light.   Neck: Neck supple.   Normal range of motion.  Cardiovascular: Normal rate, regular rhythm, normal heart sounds and intact distal pulses.   Pulmonary/Chest: Breath sounds normal.   Abdominal: Abdomen is soft. Bowel sounds are normal.   Genitourinary:    Vagina and uterus normal.     Musculoskeletal:         General: Normal range of motion.      Cervical back: Normal range of motion and neck supple.     Neurological: She is alert and oriented to person, place, and time. She has normal strength and normal reflexes.   Skin: Skin is warm. Capillary refill takes less than 2 seconds.   Psychiatric: She has a normal mood and affect. Her behavior is normal. Judgment and thought content normal.         Medical Screening Exam   See Full Note    ED Course   Procedures  Labs Reviewed   COMPREHENSIVE METABOLIC PANEL - Abnormal; Notable for the following components:       Result Value    Potassium 2.1 (*)     Chloride 97 (*)     CO2 33 (*)     Glucose 136 (*)     Calcium 7.8 (*)     Bilirubin, Total 1.4 (*)     AST 12 (*)     All other components within normal limits   CBC WITH DIFFERENTIAL - Abnormal; Notable for the following components:    WBC 4.42 (*)     Hematocrit 37.9 (*)     Lymphocytes % 41.2 (*)     Eosinophils % 0.2 (*)     All other components within normal limits   TROPONIN I - Normal   CBC W/ AUTO DIFFERENTIAL    Narrative:     The  following orders were created for panel order CBC auto differential.  Procedure                               Abnormality         Status                     ---------                               -----------         ------                     CBC with Differential[[id number]]        Abnormal            Final result                 Please view results for these tests on the individual orders.        ECG Results          EKG 12-lead (In process)  Result time 05/02/22 10:18:34    In process by Interface, Lab In [facility] (05/02/22 10:18:34)                 Narrative:    Test Reason : R07.9,    Vent. Rate : 087 BPM     Atrial Rate : 087 BPM     P-R Int : 174 ms          QRS Dur : 088 ms      QT Int : 404 ms       P-R-T Axes : 039 -35 -27 degrees     QTc Int : 486 ms    Normal sinus rhythm  Left axis deviation  Minimal voltage criteria for LVH, may be normal variant  ST and T wave abnormality, consider lateral ischemia  Prolonged QT  Abnormal ECG  No previous ECGs available    Referred By: [email or web address]   SELF           Confirmed By:                             Imaging Results    None          Medications   potassium bicarbonate disintegrating tablet 60 mEq (60 mEq Oral Given 5/2/22 1147)                       Clinical Impression:   Final diagnoses:  [E87.6] Hypokalemia (Primary)  [M94.0] Costochondritis          ED Disposition Condition    Discharge Stable        ED Prescriptions     Medication Sig Dispense Start Date End Date Auth. Provider    potassium bicarbonate (K-LYTE) disintegrating tablet Take 1 tablet (20 mEq total) by mouth once daily. 30 tablet 5/2/2022  Sean Herron,     naproxen (NAPROSYN) 375 MG tablet Take 1 tablet (375 mg total) by mouth 2 (two) times daily with meals. 60 tablet 5/2/2022  Sean Herron DO        Follow-up Information    None          Sean Herron DO  05/02/22 7899

## 2022-05-03 ENCOUNTER — TELEPHONE (OUTPATIENT)
Dept: EMERGENCY MEDICINE | Facility: HOSPITAL | Age: 67
End: 2022-05-03
Payer: COMMERCIAL

## 2022-05-23 NOTE — PROGRESS NOTES
Subjective:      Patient ID: Xavier Rodriguez is a 66 y.o. female.    Chief Complaint: Back Pain      Headache   This is a chronic problem. The current episode started more than 1 year ago. The problem occurs daily. The problem has been unchanged. Associated symptoms include back pain and neck pain. Pertinent negatives include no coughing, dizziness, ear pain, eye pain, eye redness, fever, photophobia, seizures, sore throat or vomiting.   Low-back Pain  Associated symptoms include headaches and leg pain. Pertinent negatives include no bladder incontinence, chest pain, dysuria or fever.   Leg Pain   Pertinent negatives include no fever.   Pain  This is a chronic problem. The current episode started more than 1 year ago. The problem occurs daily. The problem has been unchanged. Associated symptoms include arthralgias, headaches and neck pain. Pertinent negatives include no change in bowel habit, chest pain, chills, coughing, fatigue, fever, rash, sore throat, vertigo or vomiting.     Review of Systems   Constitutional: Negative for appetite change, chills, fatigue, fever and unexpected weight change.   HENT: Negative for drooling, ear discharge, ear pain, facial swelling, mouth dryness, nosebleeds, sore throat, trouble swallowing, voice change and goiter.    Eyes: Negative for photophobia, pain, discharge, redness and visual disturbance.   Respiratory: Negative for apnea, cough, choking, chest tightness, shortness of breath, wheezing and stridor.    Cardiovascular: Negative for chest pain, palpitations and leg swelling.   Gastrointestinal: Negative for abdominal distention, change in bowel habit, diarrhea, rectal pain, vomiting, fecal incontinence and change in bowel habit.   Endocrine: Negative for cold intolerance, heat intolerance, polydipsia, polyphagia and polyuria.   Genitourinary: Negative for bladder incontinence, dysuria, flank pain, frequency and hot flashes.   Musculoskeletal: Positive for arthralgias, back  "pain, leg pain and neck pain.   Integumentary:  Negative for color change, pallor and rash.   Allergic/Immunologic: Negative for immunocompromised state.   Neurological: Positive for headaches. Negative for dizziness, vertigo, seizures, syncope, facial asymmetry, speech difficulty, light-headedness, disturbances in coordination, memory loss and coordination difficulties.   Hematological: Negative for adenopathy. Does not bruise/bleed easily.   Psychiatric/Behavioral: Negative for agitation, behavioral problems, confusion, decreased concentration, dysphoric mood, hallucinations, self-injury and suicidal ideas. The patient is not nervous/anxious and is not hyperactive.             Objective:  Vitals:    05/25/22 0808 05/25/22 0809   BP: 130/74    Pulse: 75    Resp: 18    SpO2: 98%    Weight: 61.2 kg (135 lb)    Height: 5' 2" (1.575 m)    PainSc: 10-Worst pain ever 10-Worst pain ever         Physical Exam  Vitals and nursing note reviewed. Exam conducted with a chaperone present.   Constitutional:       General: She is awake. She is not in acute distress.     Appearance: Normal appearance. She is not toxic-appearing.   HENT:      Head: Normocephalic and atraumatic.      Nose: Nose normal.      Mouth/Throat:      Mouth: Mucous membranes are moist.      Pharynx: Oropharynx is clear.   Eyes:      Conjunctiva/sclera: Conjunctivae normal.      Pupils: Pupils are equal, round, and reactive to light.   Cardiovascular:      Rate and Rhythm: Normal rate.   Pulmonary:      Effort: Pulmonary effort is normal. No respiratory distress.   Abdominal:      Palpations: Abdomen is soft.      Tenderness: There is no guarding.   Musculoskeletal:         General: Normal range of motion.      Cervical back: Normal range of motion and neck supple. No rigidity.      Thoracic back: Tenderness present.      Lumbar back: Tenderness present.   Skin:     General: Skin is warm and dry.      Coloration: Skin is not jaundiced or pale.   Neurological: "      General: No focal deficit present.      Mental Status: She is alert and oriented to person, place, and time. Mental status is at baseline.      Cranial Nerves: Cranial nerves are intact. No cranial nerve deficit (II-XII).   Psychiatric:         Mood and Affect: Mood normal.         Behavior: Behavior normal. Behavior is cooperative.         Thought Content: Thought content normal.           X-Ray Abdomen AP 1 View (KUB)  Narrative: EXAMINATION:  XR ABDOMEN AP 1 VIEW    CLINICAL HISTORY:  Constipation, unspecified    TECHNIQUE:  XR ABDOMEN AP 1 VIEW    COMPARISON:  Comparisons were reviewed, if available.    FINDINGS:  Lower lobes are clear    There is no bowel obstruction.  No free air.  No obvious renal calculi.  Mild colonic stool.    No acute bone findings.  Impression: As above    Electronically signed by: Wilmar Kelly  Date:    11/19/2021  Time:    20:32       Admission on 05/02/2022, Discharged on 05/02/2022   Component Date Value Ref Range Status    Troponin I High Sensitivity 05/02/2022 6.9  <=60.4 pg/mL Final    Sodium 05/02/2022 140  136 - 145 mmol/L Final    Potassium 05/02/2022 2.1 (A) 3.5 - 5.1 mmol/L Final    Chloride 05/02/2022 97 (A) 98 - 107 mmol/L Final    CO2 05/02/2022 33 (A) 21 - 32 mmol/L Final    Anion Gap 05/02/2022 12  7 - 16 mmol/L Final    Glucose 05/02/2022 136 (A) 74 - 106 mg/dL Final    BUN 05/02/2022 9  7 - 18 mg/dL Final    Creatinine 05/02/2022 0.88  0.55 - 1.02 mg/dL Final    BUN/Creatinine Ratio 05/02/2022 10  6 - 20 Final    Calcium 05/02/2022 7.8 (A) 8.5 - 10.1 mg/dL Final    Total Protein 05/02/2022 8.1  6.4 - 8.2 g/dL Final    Albumin 05/02/2022 4.1  3.5 - 5.0 g/dL Final    Globulin 05/02/2022 4.0  2.0 - 4.0 g/dL Final    A/G Ratio 05/02/2022 1.0   Final    Bilirubin, Total 05/02/2022 1.4 (A) 0.0 - 1.2 mg/dL Final    Alk Phos 05/02/2022 100  55 - 142 U/L Final    ALT 05/02/2022 18  13 - 56 U/L Final    AST 05/02/2022 12 (A) 15 - 37 U/L Final    eGFR  05/02/2022 68  >=60 mL/min/1.73m² Final    WBC 05/02/2022 4.42 (A) 4.50 - 11.00 K/uL Final    RBC 05/02/2022 4.27  4.20 - 5.40 M/uL Final    Hemoglobin 05/02/2022 12.5  12.0 - 16.0 g/dL Final    Hematocrit 05/02/2022 37.9 (A) 38.0 - 47.0 % Final    MCV 05/02/2022 88.8  80.0 - 96.0 fL Final    MCH 05/02/2022 29.3  27.0 - 31.0 pg Final    MCHC 05/02/2022 33.0  32.0 - 36.0 g/dL Final    RDW 05/02/2022 14.3  11.5 - 14.5 % Final    Platelet Count 05/02/2022 374  150 - 400 K/uL Final    MPV 05/02/2022 9.9  9.4 - 12.4 fL Final    Neutrophils % 05/02/2022 54.3  53.0 - 65.0 % Final    Lymphocytes % 05/02/2022 41.2 (A) 27.0 - 41.0 % Final    Neutrophils, Abs 05/02/2022 2.40  1.80 - 7.70 K/uL Final    Lymphocytes, Absolute 05/02/2022 1.82  1.00 - 4.80 K/uL Final    Diff Type 05/02/2022 Auto   Final    Monocytes % 05/02/2022 3.8  2.0 - 6.0 % Final    Eosinophils % 05/02/2022 0.2 (A) 1.0 - 4.0 % Final    Basophils % 05/02/2022 0.5  0.0 - 1.0 % Final    Monocytes, Absolute 05/02/2022 0.17  0.00 - 0.80 K/uL Final    Eosinophils, Absolute 05/02/2022 0.01  0.00 - 0.50 K/uL Final    Basophils, Absolute 05/02/2022 0.02  0.00 - 0.20 K/uL Final   Office Visit on 02/16/2022   Component Date Value Ref Range Status    POC Amphetamines 02/16/2022 Negative  Negative, Inconclusive Final    POC Barbiturates 02/16/2022 Negative  Negative, Inconclusive Final    POC Benzodiazepines 02/16/2022 Negative  Negative, Inconclusive Final    POC Cocaine 02/16/2022 Negative  Negative, Inconclusive Final    POC THC 02/16/2022 Negative  Negative, Inconclusive Final    POC Methadone 02/16/2022 Negative  Negative, Inconclusive Final    POC Methamphetamine 02/16/2022 Negative  Negative, Inconclusive Final    POC Opiates 02/16/2022 Negative  Negative, Inconclusive Final    POC Oxycodone 02/16/2022 Negative  Negative, Inconclusive Final    POC Phencyclidine 02/16/2022 Negative  Negative, Inconclusive Final    POC  Methylenedioxymethamphetamine * 02/16/2022 Negative  Negative, Inconclusive Final    POC Tricyclic Antidepressants 02/16/2022 Negative  Negative, Inconclusive Final    POC Buprenorphine 02/16/2022 Negative   Final     Acceptable 02/16/2022 Yes   Final    POC Temperature (Urine) 02/16/2022 94   Final           Assessment:      1. Lumbosacral radiculopathy    2. Disorder of sacrum    3. Encounter for long-term (current) use of other medications    4. Difficulty voiding    5. Therapeutic opioid-induced constipation (OIC)          Orders Placed This Encounter   Procedures    Drug Screen Definitive 14, Urine     Standing Status:   Future     Number of Occurrences:   1     Standing Expiration Date:   7/24/2023     Order Specific Question:   Specimen Source     Answer:   Urine    Ambulatory referral/consult to Urology     Standing Status:   Future     Standing Expiration Date:   6/25/2023     Referral Priority:   Routine     Referral Type:   Consultation     Referral Reason:   Specialty Services Required     Requested Specialty:   Urology     Number of Visits Requested:   1    POCT Urine Drug Screen Presump     Interpretive Information:     Negative:  No drug detected at the cut off level.   Positive:  This result represents presumptive positive for the   tested drug, other substances may yield a positive response other   than the analyte of interest. This result should be utilized for   diagnostic purpose only. Confirmation testing will be performed upon physician request only.            A's of Opioid Risk Assessment  Activity:Patient can perform ADL.   Analgesia:Patients pain is partially controlled by current medication. Patient has tried OTC medications such as Tylenol and Ibuprofen with out relief.   Adverse Effects: Patient denies constipation or sedation.  Aberrant Behavior:  reviewed with no aberrant drug seeking/taking behavior.  Overdose reversal drug naloxone discussed    Drug screen  reviewed      Requested Prescriptions     Signed Prescriptions Disp Refills    HYDROcodone-acetaminophen (NORCO) 7.5-325 mg per tablet 30 tablet 0     Sig: Take 1 tablet by mouth once daily.    HYDROcodone-acetaminophen (NORCO) 7.5-325 mg per tablet 30 tablet 0     Sig: Take 1 tablet by mouth once daily.    naloxegoL (MOVANTIK) 25 mg tablet 30 tablet 1     Sig: Take 25 mg by mouth once daily.         Plan:    Presumptive drug screen consistent will order definitive for compliance with narcotics    Recheck vitamin-D November 2022  She completed vitamin-D replacement as directed    Complaint of difficulty voiding requesting assistance with referral urology    Continues to complain of opioid induced constipation    Will refill Movantik    Patient has tried over-the-counter laxatives to include fiber, Metamucil, Ex-Lax, MiraLax none of alleviated her discomfort    Referral urology please evaluate treat difficulty voiding    She states current medications helping control her chronic discomfort she would like to continue conservative management    Continue home exercise program as directed    Follow-up 2 months    Dr. Mendez, October 2022    Bring original prescription medication bottles/container/box with labels to each visit

## 2022-05-25 ENCOUNTER — OFFICE VISIT (OUTPATIENT)
Dept: PAIN MEDICINE | Facility: CLINIC | Age: 67
End: 2022-05-25
Payer: COMMERCIAL

## 2022-05-25 VITALS
HEART RATE: 75 BPM | HEIGHT: 62 IN | RESPIRATION RATE: 18 BRPM | BODY MASS INDEX: 24.84 KG/M2 | DIASTOLIC BLOOD PRESSURE: 74 MMHG | WEIGHT: 135 LBS | OXYGEN SATURATION: 98 % | SYSTOLIC BLOOD PRESSURE: 130 MMHG

## 2022-05-25 DIAGNOSIS — R39.198 DIFFICULTY VOIDING: Chronic | ICD-10-CM

## 2022-05-25 DIAGNOSIS — M53.3 DISORDER OF SACRUM: Chronic | ICD-10-CM

## 2022-05-25 DIAGNOSIS — T40.2X5A THERAPEUTIC OPIOID-INDUCED CONSTIPATION (OIC): Chronic | ICD-10-CM

## 2022-05-25 DIAGNOSIS — K59.03 THERAPEUTIC OPIOID-INDUCED CONSTIPATION (OIC): Chronic | ICD-10-CM

## 2022-05-25 DIAGNOSIS — M54.17 LUMBOSACRAL RADICULOPATHY: Primary | Chronic | ICD-10-CM

## 2022-05-25 DIAGNOSIS — Z79.899 ENCOUNTER FOR LONG-TERM (CURRENT) USE OF OTHER MEDICATIONS: ICD-10-CM

## 2022-05-25 LAB
CTP QC/QA: YES
POC (AMP) AMPHETAMINE: NEGATIVE
POC (BAR) BARBITURATES: NEGATIVE
POC (BUP) BUPRENORPHINE: NEGATIVE
POC (BZO) BENZODIAZEPINES: NEGATIVE
POC (COC) COCAINE: NEGATIVE
POC (MDMA) METHYLENEDIOXYMETHAMPHETAMINE 3,4: NEGATIVE
POC (MET) METHAMPHETAMINE: NEGATIVE
POC (MOP) OPIATES: ABNORMAL
POC (MTD) METHADONE: NEGATIVE
POC (OXY) OXYCODONE: NEGATIVE
POC (PCP) PHENCYCLIDINE: NEGATIVE
POC (TCA) TRICYCLIC ANTIDEPRESSANTS: NEGATIVE
POC TEMPERATURE (URINE): 94
POC THC: NEGATIVE

## 2022-05-25 PROCEDURE — 1101F PT FALLS ASSESS-DOCD LE1/YR: CPT | Mod: CPTII,,, | Performed by: PHYSICIAN ASSISTANT

## 2022-05-25 PROCEDURE — 3288F PR FALLS RISK ASSESSMENT DOCUMENTED: ICD-10-PCS | Mod: CPTII,,, | Performed by: PHYSICIAN ASSISTANT

## 2022-05-25 PROCEDURE — 1159F PR MEDICATION LIST DOCUMENTED IN MEDICAL RECORD: ICD-10-PCS | Mod: CPTII,,, | Performed by: PHYSICIAN ASSISTANT

## 2022-05-25 PROCEDURE — 99215 OFFICE O/P EST HI 40 MIN: CPT | Mod: PBBFAC | Performed by: PHYSICIAN ASSISTANT

## 2022-05-25 PROCEDURE — 1125F PR PAIN SEVERITY QUANTIFIED, PAIN PRESENT: ICD-10-PCS | Mod: CPTII,,, | Performed by: PHYSICIAN ASSISTANT

## 2022-05-25 PROCEDURE — 3288F FALL RISK ASSESSMENT DOCD: CPT | Mod: CPTII,,, | Performed by: PHYSICIAN ASSISTANT

## 2022-05-25 PROCEDURE — 1101F PR PT FALLS ASSESS DOC 0-1 FALLS W/OUT INJ PAST YR: ICD-10-PCS | Mod: CPTII,,, | Performed by: PHYSICIAN ASSISTANT

## 2022-05-25 PROCEDURE — 3078F PR MOST RECENT DIASTOLIC BLOOD PRESSURE < 80 MM HG: ICD-10-PCS | Mod: CPTII,,, | Performed by: PHYSICIAN ASSISTANT

## 2022-05-25 PROCEDURE — 3075F SYST BP GE 130 - 139MM HG: CPT | Mod: CPTII,,, | Performed by: PHYSICIAN ASSISTANT

## 2022-05-25 PROCEDURE — 80305 DRUG TEST PRSMV DIR OPT OBS: CPT | Mod: PBBFAC | Performed by: PHYSICIAN ASSISTANT

## 2022-05-25 PROCEDURE — 3075F PR MOST RECENT SYSTOLIC BLOOD PRESS GE 130-139MM HG: ICD-10-PCS | Mod: CPTII,,, | Performed by: PHYSICIAN ASSISTANT

## 2022-05-25 PROCEDURE — 1159F MED LIST DOCD IN RCRD: CPT | Mod: CPTII,,, | Performed by: PHYSICIAN ASSISTANT

## 2022-05-25 PROCEDURE — 99214 OFFICE O/P EST MOD 30 MIN: CPT | Mod: S$PBB,,, | Performed by: PHYSICIAN ASSISTANT

## 2022-05-25 PROCEDURE — 99214 PR OFFICE/OUTPT VISIT, EST, LEVL IV, 30-39 MIN: ICD-10-PCS | Mod: S$PBB,,, | Performed by: PHYSICIAN ASSISTANT

## 2022-05-25 PROCEDURE — 3008F PR BODY MASS INDEX (BMI) DOCUMENTED: ICD-10-PCS | Mod: CPTII,,, | Performed by: PHYSICIAN ASSISTANT

## 2022-05-25 PROCEDURE — 1125F AMNT PAIN NOTED PAIN PRSNT: CPT | Mod: CPTII,,, | Performed by: PHYSICIAN ASSISTANT

## 2022-05-25 PROCEDURE — 3078F DIAST BP <80 MM HG: CPT | Mod: CPTII,,, | Performed by: PHYSICIAN ASSISTANT

## 2022-05-25 PROCEDURE — 3008F BODY MASS INDEX DOCD: CPT | Mod: CPTII,,, | Performed by: PHYSICIAN ASSISTANT

## 2022-05-25 RX ORDER — NALOXEGOL OXALATE 25 MG/1
25 TABLET, FILM COATED ORAL DAILY
Qty: 30 TABLET | Refills: 1 | Status: SHIPPED | OUTPATIENT
Start: 2022-05-25 | End: 2022-08-22 | Stop reason: SDUPTHER

## 2022-05-25 RX ORDER — HYDROCODONE BITARTRATE AND ACETAMINOPHEN 7.5; 325 MG/1; MG/1
1 TABLET ORAL DAILY
Qty: 30 TABLET | Refills: 0 | Status: SHIPPED | OUTPATIENT
Start: 2022-05-31 | End: 2022-08-22 | Stop reason: SDUPTHER

## 2022-05-25 RX ORDER — HYDROCODONE BITARTRATE AND ACETAMINOPHEN 7.5; 325 MG/1; MG/1
1 TABLET ORAL DAILY
Qty: 30 TABLET | Refills: 0 | Status: SHIPPED | OUTPATIENT
Start: 2022-06-30 | End: 2022-08-22 | Stop reason: SDUPTHER

## 2022-06-01 ENCOUNTER — HOSPITAL ENCOUNTER (EMERGENCY)
Facility: HOSPITAL | Age: 67
Discharge: HOME | End: 2022-06-01
Payer: COMMERCIAL

## 2022-06-01 VITALS
HEIGHT: 64 IN | DIASTOLIC BLOOD PRESSURE: 75 MMHG | SYSTOLIC BLOOD PRESSURE: 144 MMHG | TEMPERATURE: 99 F | WEIGHT: 135 LBS | RESPIRATION RATE: 18 BRPM | OXYGEN SATURATION: 99 % | HEART RATE: 66 BPM | BODY MASS INDEX: 23.05 KG/M2

## 2022-06-01 DIAGNOSIS — N30.00 ACUTE CYSTITIS WITHOUT HEMATURIA: Primary | ICD-10-CM

## 2022-06-01 LAB
AMORPH PHOS CRY #/AREA URNS LPF: ABNORMAL /LPF
BACTERIA #/AREA URNS HPF: ABNORMAL /HPF
BILIRUB UR QL STRIP: ABNORMAL
CLARITY UR: ABNORMAL
COLOR UR: YELLOW
GLUCOSE UR STRIP-MCNC: NEGATIVE MG/DL
KETONES UR STRIP-SCNC: ABNORMAL MG/DL
LEUKOCYTE ESTERASE UR QL STRIP: NEGATIVE
MUCOUS THREADS #/AREA URNS HPF: ABNORMAL /HPF
NITRITE UR QL STRIP: NEGATIVE
PH UR STRIP: 6 PH UNITS
PROT UR QL STRIP: 30
RBC # UR STRIP: NEGATIVE /UL
RBC #/AREA URNS HPF: ABNORMAL /HPF
SP GR UR STRIP: 1.02
SQUAMOUS #/AREA URNS LPF: ABNORMAL /LPF
UROBILINOGEN UR STRIP-ACNC: 1 MG/DL
WBC #/AREA URNS HPF: ABNORMAL /HPF

## 2022-06-01 PROCEDURE — 99283 EMERGENCY DEPT VISIT LOW MDM: CPT | Performed by: FAMILY MEDICINE

## 2022-06-01 PROCEDURE — 81001 URINALYSIS AUTO W/SCOPE: CPT | Performed by: FAMILY MEDICINE

## 2022-06-01 PROCEDURE — 99283 EMERGENCY DEPT VISIT LOW MDM: CPT

## 2022-06-01 RX ORDER — PHENAZOPYRIDINE HYDROCHLORIDE 200 MG/1
200 TABLET, FILM COATED ORAL 3 TIMES DAILY
Qty: 6 TABLET | Refills: 0 | Status: SHIPPED | OUTPATIENT
Start: 2022-06-01 | End: 2022-06-11

## 2022-06-01 RX ORDER — LEVOFLOXACIN 500 MG/1
500 TABLET, FILM COATED ORAL DAILY
Qty: 8 TABLET | Refills: 0 | Status: SHIPPED | OUTPATIENT
Start: 2022-06-01 | End: 2022-06-09

## 2022-06-01 NOTE — ED PROVIDER NOTES
Encounter Date: 6/1/2022       History     Chief Complaint   Patient presents with    Urinary Retention     Patient in with dysuria and increased frequency of urination.        Review of patient's allergies indicates:  No Known Allergies  Past Medical History:   Diagnosis Date    Chronic low back pain     Depression     Dysfunctional voiding of urine 11/10/2021    Patient states she does not urinate.  She was taken off of lasix which was the only thing that helped her empty. PVR 0 ml Chronic constipation, lifelong.  Has been out of Linzess for a month now. Renal function WNL when last checked in May    Edema     Hypertension     IBS (irritable bowel syndrome)      Past Surgical History:   Procedure Laterality Date    TUMOR REMOVAL       Family History   Problem Relation Age of Onset    Heart disease Mother     Heart attack Mother     Heart disease Father     Heart attack Father      Social History     Tobacco Use    Smoking status: Never Smoker    Smokeless tobacco: Never Used   Substance Use Topics    Alcohol use: Never    Drug use: Never     Review of Systems   Constitutional: Negative for fever.   HENT: Negative for sore throat.    Respiratory: Negative for shortness of breath.    Cardiovascular: Negative for chest pain.   Gastrointestinal: Negative for nausea.   Genitourinary: Negative for dysuria.   Musculoskeletal: Negative for back pain.   Skin: Negative for rash.   Neurological: Negative for weakness.   Hematological: Does not bruise/bleed easily.       Physical Exam     Initial Vitals [06/01/22 0754]   BP Pulse Resp Temp SpO2   (!) 144/75 66 18 98.5 °F (36.9 °C) 99 %      MAP       --         Physical Exam    Constitutional: She appears well-developed and well-nourished.   HENT:   Head: Normocephalic and atraumatic.   Right Ear: External ear normal.   Left Ear: External ear normal.   Nose: Nose normal.   Mouth/Throat: Oropharynx is clear and moist.   Eyes: Conjunctivae and EOM are normal.  Pupils are equal, round, and reactive to light.   Neck: Neck supple.   Normal range of motion.  Cardiovascular: Normal rate, regular rhythm, normal heart sounds and intact distal pulses.   Pulmonary/Chest: Breath sounds normal.   Abdominal: Abdomen is soft. Bowel sounds are normal.   Genitourinary:    Vagina and uterus normal.     Musculoskeletal:         General: Normal range of motion.      Cervical back: Normal range of motion and neck supple.     Neurological: She is alert and oriented to person, place, and time. She has normal strength and normal reflexes.   Skin: Skin is warm. Capillary refill takes less than 2 seconds.   Psychiatric: She has a normal mood and affect. Her behavior is normal. Judgment and thought content normal.         Medical Screening Exam   See Full Note    ED Course   Procedures  Labs Reviewed   URINALYSIS, REFLEX TO URINE CULTURE - Abnormal; Notable for the following components:       Result Value    Clarity, UA Slightly Cloudy (*)     Protein, UA 30  (*)     Bilirubin, UA Small (*)     All other components within normal limits   URINALYSIS, MICROSCOPIC - Abnormal; Notable for the following components:    Bacteria, UA Few (*)     Squamous Epithelial Cells, UA Few (*)     Mucus, UA Loaded (*)     Amorphous Crystals, UA Few (*)     All other components within normal limits          Imaging Results    None          Medications - No data to display                    Clinical Impression:   Final diagnoses:  [N30.00] Acute cystitis without hematuria (Primary)          ED Disposition Condition    Discharge Stable        ED Prescriptions     Medication Sig Dispense Start Date End Date Auth. Provider    levoFLOXacin (LEVAQUIN) 500 MG tablet Take 1 tablet (500 mg total) by mouth once daily. for 8 days 8 tablet 6/1/2022 6/9/2022 Sean Herron,     phenazopyridine (PYRIDIUM) 200 MG tablet Take 1 tablet (200 mg total) by mouth 3 (three) times daily. for 10 days 6 tablet 6/1/2022 6/11/2022  Sean Herron, DO        Follow-up Information    None          Sean Herron,   06/01/22 0829

## 2022-06-03 ENCOUNTER — TELEPHONE (OUTPATIENT)
Dept: EMERGENCY MEDICINE | Facility: HOSPITAL | Age: 67
End: 2022-06-03
Payer: COMMERCIAL

## 2022-06-03 ENCOUNTER — OFFICE VISIT (OUTPATIENT)
Dept: FAMILY MEDICINE | Facility: CLINIC | Age: 67
End: 2022-06-03
Payer: COMMERCIAL

## 2022-06-03 VITALS
HEART RATE: 60 BPM | WEIGHT: 167.5 LBS | OXYGEN SATURATION: 98 % | DIASTOLIC BLOOD PRESSURE: 68 MMHG | SYSTOLIC BLOOD PRESSURE: 132 MMHG | BODY MASS INDEX: 28.6 KG/M2 | RESPIRATION RATE: 20 BRPM | TEMPERATURE: 98 F | HEIGHT: 64 IN

## 2022-06-03 DIAGNOSIS — E87.6 HYPOKALEMIA: ICD-10-CM

## 2022-06-03 DIAGNOSIS — R33.9 URINARY RETENTION: Primary | ICD-10-CM

## 2022-06-03 LAB
ANION GAP SERPL CALCULATED.3IONS-SCNC: 10 MMOL/L (ref 7–16)
BACTERIA #/AREA URNS HPF: ABNORMAL /HPF
BILIRUB SERPL-MCNC: NEGATIVE MG/DL
BLOOD URINE, POC: NEGATIVE
BUN SERPL-MCNC: 9 MG/DL (ref 7–18)
BUN/CREAT SERPL: 11 (ref 6–20)
CALCIUM SERPL-MCNC: 8.6 MG/DL (ref 8.5–10.1)
CHLORIDE SERPL-SCNC: 104 MMOL/L (ref 98–107)
CO2 SERPL-SCNC: 31 MMOL/L (ref 21–32)
COLOR, POC UA: YELLOW
CREAT SERPL-MCNC: 0.82 MG/DL (ref 0.55–1.02)
GLUCOSE SERPL-MCNC: 56 MG/DL (ref 74–106)
GLUCOSE UR QL STRIP: NEGATIVE
KETONES UR QL STRIP: NEGATIVE
LEUKOCYTE ESTERASE URINE, POC: NORMAL
MUCOUS THREADS #/AREA URNS HPF: ABNORMAL /HPF
NITRITE, POC UA: NEGATIVE
PH, POC UA: 6.5
POTASSIUM SERPL-SCNC: 3.3 MMOL/L (ref 3.5–5.1)
PROTEIN, POC: NORMAL
RBC #/AREA URNS HPF: ABNORMAL /HPF
SODIUM SERPL-SCNC: 142 MMOL/L (ref 136–145)
SPECIFIC GRAVITY, POC UA: 1.01
SQUAMOUS #/AREA URNS LPF: ABNORMAL /LPF
TRICHOMONAS #/AREA URNS HPF: ABNORMAL /HPF
UROBILINOGEN, POC UA: 0.2
WBC #/AREA URNS HPF: ABNORMAL /HPF
YEAST #/AREA URNS HPF: ABNORMAL /HPF

## 2022-06-03 PROCEDURE — 99213 OFFICE O/P EST LOW 20 MIN: CPT | Mod: ,,, | Performed by: NURSE PRACTITIONER

## 2022-06-03 PROCEDURE — 1159F PR MEDICATION LIST DOCUMENTED IN MEDICAL RECORD: ICD-10-PCS | Mod: ,,, | Performed by: NURSE PRACTITIONER

## 2022-06-03 PROCEDURE — 3288F FALL RISK ASSESSMENT DOCD: CPT | Mod: ,,, | Performed by: NURSE PRACTITIONER

## 2022-06-03 PROCEDURE — 3008F PR BODY MASS INDEX (BMI) DOCUMENTED: ICD-10-PCS | Mod: ,,, | Performed by: NURSE PRACTITIONER

## 2022-06-03 PROCEDURE — 1160F PR REVIEW ALL MEDS BY PRESCRIBER/CLIN PHARMACIST DOCUMENTED: ICD-10-PCS | Mod: ,,, | Performed by: NURSE PRACTITIONER

## 2022-06-03 PROCEDURE — 99213 PR OFFICE/OUTPT VISIT, EST, LEVL III, 20-29 MIN: ICD-10-PCS | Mod: ,,, | Performed by: NURSE PRACTITIONER

## 2022-06-03 PROCEDURE — 80048 BASIC METABOLIC PANEL: ICD-10-PCS | Mod: ,,, | Performed by: CLINICAL MEDICAL LABORATORY

## 2022-06-03 PROCEDURE — 1160F RVW MEDS BY RX/DR IN RCRD: CPT | Mod: ,,, | Performed by: NURSE PRACTITIONER

## 2022-06-03 PROCEDURE — 1101F PT FALLS ASSESS-DOCD LE1/YR: CPT | Mod: ,,, | Performed by: NURSE PRACTITIONER

## 2022-06-03 PROCEDURE — 3288F PR FALLS RISK ASSESSMENT DOCUMENTED: ICD-10-PCS | Mod: ,,, | Performed by: NURSE PRACTITIONER

## 2022-06-03 PROCEDURE — 3078F PR MOST RECENT DIASTOLIC BLOOD PRESSURE < 80 MM HG: ICD-10-PCS | Mod: ,,, | Performed by: NURSE PRACTITIONER

## 2022-06-03 PROCEDURE — 81001 URINALYSIS, MICROSCOPIC: ICD-10-PCS | Mod: ,,, | Performed by: CLINICAL MEDICAL LABORATORY

## 2022-06-03 PROCEDURE — 81003 POCT URINALYSIS W/O SCOPE: ICD-10-PCS | Mod: QW,,, | Performed by: NURSE PRACTITIONER

## 2022-06-03 PROCEDURE — 87086 URINE CULTURE/COLONY COUNT: CPT | Mod: ,,, | Performed by: CLINICAL MEDICAL LABORATORY

## 2022-06-03 PROCEDURE — 87086 CULTURE, URINE: ICD-10-PCS | Mod: ,,, | Performed by: CLINICAL MEDICAL LABORATORY

## 2022-06-03 PROCEDURE — 81001 URINALYSIS AUTO W/SCOPE: CPT | Mod: ,,, | Performed by: CLINICAL MEDICAL LABORATORY

## 2022-06-03 PROCEDURE — 1126F PR PAIN SEVERITY QUANTIFIED, NO PAIN PRESENT: ICD-10-PCS | Mod: ,,, | Performed by: NURSE PRACTITIONER

## 2022-06-03 PROCEDURE — 80048 BASIC METABOLIC PNL TOTAL CA: CPT | Mod: ,,, | Performed by: CLINICAL MEDICAL LABORATORY

## 2022-06-03 PROCEDURE — 1101F PR PT FALLS ASSESS DOC 0-1 FALLS W/OUT INJ PAST YR: ICD-10-PCS | Mod: ,,, | Performed by: NURSE PRACTITIONER

## 2022-06-03 PROCEDURE — 1159F MED LIST DOCD IN RCRD: CPT | Mod: ,,, | Performed by: NURSE PRACTITIONER

## 2022-06-03 PROCEDURE — 3075F SYST BP GE 130 - 139MM HG: CPT | Mod: ,,, | Performed by: NURSE PRACTITIONER

## 2022-06-03 PROCEDURE — 3078F DIAST BP <80 MM HG: CPT | Mod: ,,, | Performed by: NURSE PRACTITIONER

## 2022-06-03 PROCEDURE — 1126F AMNT PAIN NOTED NONE PRSNT: CPT | Mod: ,,, | Performed by: NURSE PRACTITIONER

## 2022-06-03 PROCEDURE — 3075F PR MOST RECENT SYSTOLIC BLOOD PRESS GE 130-139MM HG: ICD-10-PCS | Mod: ,,, | Performed by: NURSE PRACTITIONER

## 2022-06-03 PROCEDURE — 81003 URINALYSIS AUTO W/O SCOPE: CPT | Mod: QW,,, | Performed by: NURSE PRACTITIONER

## 2022-06-03 PROCEDURE — 3008F BODY MASS INDEX DOCD: CPT | Mod: ,,, | Performed by: NURSE PRACTITIONER

## 2022-06-03 NOTE — PROGRESS NOTES
New clinic note    Xavier Rodriguez is a 66 y.o. female     Chief Complaint:   Chief Complaint   Patient presents with    Urinary Retention     States she has not urinated in over a week    Constipation     For over 3 weeks        Subjective:    Patient complains of urinary retention X 1 week. States she cannot void at all. States she has not urinated in 1 week. Denies abdominal pain. States she has tried to increase water intake but that does not help. Denies fever.  Patient very poor historian.          Allergies:   Review of patient's allergies indicates:  No Known Allergies     Past Medical History:  Past Medical History:   Diagnosis Date    Chronic low back pain     Depression     Dysfunctional voiding of urine 11/10/2021    Patient states she does not urinate.  She was taken off of lasix which was the only thing that helped her empty. PVR 0 ml Chronic constipation, lifelong.  Has been out of Linzess for a month now. Renal function WNL when last checked in May    Edema     Hyperlipidemia     Hypertension     IBS (irritable bowel syndrome)         Current Medications:    Current Outpatient Medications:     amitriptyline (ELAVIL) 25 MG tablet, Take 25 mg by mouth., Disp: , Rfl:     HYDROcodone-acetaminophen (NORCO) 7.5-325 mg per tablet, Take 1 tablet by mouth once daily., Disp: 30 tablet, Rfl: 0    levoFLOXacin (LEVAQUIN) 500 MG tablet, Take 1 tablet (500 mg total) by mouth once daily. for 8 days, Disp: 8 tablet, Rfl: 0    LINZESS 145 mcg Cap capsule, Take 1 capsule (145 mcg total) by mouth once daily., Disp: 30 capsule, Rfl: 0    naloxegoL (MOVANTIK) 25 mg tablet, Take 25 mg by mouth once daily., Disp: 30 tablet, Rfl: 1    naproxen (NAPROSYN) 375 MG tablet, Take 1 tablet (375 mg total) by mouth 2 (two) times daily with meals., Disp: 60 tablet, Rfl: 0    phenazopyridine (PYRIDIUM) 200 MG tablet, Take 1 tablet (200 mg total) by mouth 3 (three) times daily. for 10 days, Disp: 6 tablet, Rfl: 0     "potassium bicarbonate (K-LYTE) disintegrating tablet, Take 1 tablet (20 mEq total) by mouth once daily., Disp: 30 tablet, Rfl: 1    furosemide (LASIX) 40 MG tablet, Take 40 mg by mouth., Disp: , Rfl:     [START ON 6/30/2022] HYDROcodone-acetaminophen (NORCO) 7.5-325 mg per tablet, Take 1 tablet by mouth once daily., Disp: 30 tablet, Rfl: 0    lovastatin (MEVACOR) 20 MG tablet, Take 20 mg by mouth., Disp: , Rfl:     tamsulosin (FLOMAX) 0.4 mg Cap, , Disp: , Rfl:     topiramate 25 mg capsule, Take 25 mg by mouth., Disp: , Rfl:        Review of Systems   Constitutional: Negative for fever.   Gastrointestinal: Negative for abdominal pain, nausea and vomiting.   Genitourinary: Positive for difficulty urinating. Negative for dysuria, flank pain, frequency and urgency.          Objective:    /68 (BP Location: Right arm, Patient Position: Sitting, BP Method: Large (Manual))   Pulse 60   Temp 97.5 °F (36.4 °C) (Skin)   Resp 20   Ht 5' 4" (1.626 m)   Wt 76 kg (167 lb 8 oz)   SpO2 98%   BMI 28.75 kg/m²      Physical Exam  Constitutional:       Appearance: Normal appearance.   Eyes:      Extraocular Movements: Extraocular movements intact.   Cardiovascular:      Rate and Rhythm: Normal rate and regular rhythm.      Pulses: Normal pulses.      Heart sounds: Normal heart sounds.   Pulmonary:      Effort: Pulmonary effort is normal.      Breath sounds: Normal breath sounds.   Abdominal:      General: There is no distension.      Palpations: Abdomen is soft.      Tenderness: There is no abdominal tenderness. There is no right CVA tenderness, left CVA tenderness, guarding or rebound.   Neurological:      Mental Status: She is alert. She is disoriented.          Assessment and Plan:    1. Urinary retention    2. Hypokalemia         Urinary retention  -     POCT URINALYSIS W/O SCOPE  -     Urine culture; Future; Expected date: 06/03/2022  -     Urinalysis, Microscopic; Future; Expected date: 06/03/2022  -     Cancel: " Urine culture    Hypokalemia  -     Basic Metabolic Panel; Future; Expected date: 06/03/2022       Patient was able to go to restroom and immediately urinate cup full of urine. Appears patient also recently went to ED a few days ago with same complaint. Urine also collected at ED visit. Patient has no abdominal pain or bladder distention. I called and spoke with daughter Jodi per patient request. Jodi states patient has been complaining but unsure if voiding. Patient was seeing urologist in [place] and then was referred to another urology specialist in [place]. Unsure of dx. Patient does take flomax daily.     There are no Patient Instructions on file for this visit.   Follow up if symptoms worsen or fail to improve.     Chart Reviewed.  Talisha Mittal MD

## 2022-06-05 LAB — UA COMPLETE W REFLEX CULTURE PNL UR: NORMAL

## 2022-06-06 PROBLEM — R33.9 URINARY RETENTION: Status: ACTIVE | Noted: 2022-06-06

## 2022-06-24 ENCOUNTER — PATIENT OUTREACH (OUTPATIENT)
Dept: FAMILY MEDICINE | Facility: CLINIC | Age: 67
End: 2022-06-24
Payer: COMMERCIAL

## 2022-08-05 DIAGNOSIS — K59.00 CONSTIPATION, UNSPECIFIED CONSTIPATION TYPE: ICD-10-CM

## 2022-08-05 RX ORDER — LINACLOTIDE 145 UG/1
CAPSULE, GELATIN COATED ORAL
Qty: 30 CAPSULE | Refills: 0 | OUTPATIENT
Start: 2022-08-05

## 2022-08-21 ENCOUNTER — HOSPITAL ENCOUNTER (EMERGENCY)
Facility: HOSPITAL | Age: 67
Discharge: HOME | End: 2022-08-21
Payer: COMMERCIAL

## 2022-08-21 VITALS
OXYGEN SATURATION: 99 % | BODY MASS INDEX: 24.84 KG/M2 | TEMPERATURE: 99 F | HEIGHT: 62 IN | DIASTOLIC BLOOD PRESSURE: 75 MMHG | SYSTOLIC BLOOD PRESSURE: 140 MMHG | RESPIRATION RATE: 18 BRPM | WEIGHT: 135 LBS | HEART RATE: 62 BPM

## 2022-08-21 DIAGNOSIS — R00.2 PALPITATIONS: Primary | ICD-10-CM

## 2022-08-21 DIAGNOSIS — Z13.9 ENCOUNTER FOR MEDICAL SCREENING EXAMINATION: ICD-10-CM

## 2022-08-21 PROCEDURE — 99281 EMR DPT VST MAYX REQ PHY/QHP: CPT

## 2022-08-21 PROCEDURE — 99499 UNLISTED E&M SERVICE: CPT | Performed by: PHYSICIAN ASSISTANT

## 2022-08-21 NOTE — ED PROVIDER NOTES
Encounter Date: 8/21/2022       History     Chief Complaint   Patient presents with    Tachycardia     Patient states she was lying in the bed and felt her heart beating fast.     Patient is a 66-year-old female with history of racing heart rate, she says she was just sitting watching TV and then her heart ran away with her.  At the time of visit her pulse is 62, regular rate and rhythm.    She states that the feeling has subsided, and this has not happened before.            Review of patient's allergies indicates:  No Known Allergies  Past Medical History:   Diagnosis Date    Chronic low back pain     Depression     Dysfunctional voiding of urine 11/10/2021    Patient states she does not urinate.  She was taken off of lasix which was the only thing that helped her empty. PVR 0 ml Chronic constipation, lifelong.  Has been out of Linzess for a month now. Renal function WNL when last checked in May    Edema     Hyperlipidemia     Hypertension     IBS (irritable bowel syndrome)      Past Surgical History:   Procedure Laterality Date    TUMOR REMOVAL       Family History   Problem Relation Age of Onset    Heart disease Mother     Heart attack Mother     Heart disease Father     Heart attack Father      Social History     Tobacco Use    Smoking status: Never Smoker    Smokeless tobacco: Never Used   Substance Use Topics    Alcohol use: Never    Drug use: Never     Review of Systems   Cardiovascular: Positive for palpitations.   All other systems reviewed and are negative.      Physical Exam     Initial Vitals [08/21/22 0304]   BP Pulse Resp Temp SpO2   (!) 140/75 62 18 98.7 °F (37.1 °C) 99 %      MAP       --         Physical Exam    Nursing note and vitals reviewed.  Constitutional: She appears well-nourished. No distress.   HENT:   Head: Atraumatic.   Eyes: EOM are normal.   Neck: Neck supple.   Cardiovascular: Normal rate, regular rhythm, normal heart sounds and intact distal pulses. Exam reveals no  gallop and no friction rub.    No murmur heard.  Pulmonary/Chest: Breath sounds normal. No respiratory distress.   Abdominal: Abdomen is soft. Bowel sounds are normal.   Musculoskeletal:         General: No edema.      Cervical back: Neck supple.     Neurological: She is alert and oriented to person, place, and time.   Skin: Skin is dry.   Psychiatric: She has a normal mood and affect.         Medical Screening Exam   See Full Note    ED Course   Procedures  Labs Reviewed - No data to display       Imaging Results    None          Medications - No data to display              ED Course as of 08/21/22 0329   Sun Aug 21, 2022   0326 Patient pulse is 62, regular rate and rhythm without murmurs gallops and rubs, I instructed her to return to the emergency department if symptoms return.    Also, I instructed her to follow up with her primary care provider if she continues to have intermittent symptoms, could she may need a Holter monitor, and referral to Cardiology. [CB]   0328 Patient will be a medical screening [CB]      ED Course User Index  [CB] CATARINO Quintanilla          Clinical Impression:   Final diagnoses:  [R00.2] Palpitations (Primary)  [Z13.9] Encounter for medical screening examination          ED Disposition Condition    Discharge Stable        ED Prescriptions     None        Follow-up Information    None          CATARINO Quintanilla  08/21/22 0329

## 2022-08-21 NOTE — ED TRIAGE NOTES
Patient states she was in bed to go to sleep and felt her heart running away with her.  Heart rate on triage 62.  Patient states this has never happened before.

## 2022-08-22 ENCOUNTER — OFFICE VISIT (OUTPATIENT)
Dept: PAIN MEDICINE | Facility: CLINIC | Age: 67
End: 2022-08-22
Payer: COMMERCIAL

## 2022-08-22 VITALS
RESPIRATION RATE: 18 BRPM | DIASTOLIC BLOOD PRESSURE: 68 MMHG | SYSTOLIC BLOOD PRESSURE: 126 MMHG | BODY MASS INDEX: 30.18 KG/M2 | WEIGHT: 164 LBS | HEIGHT: 62 IN | HEART RATE: 64 BPM

## 2022-08-22 DIAGNOSIS — M54.17 LUMBOSACRAL RADICULOPATHY: Primary | Chronic | ICD-10-CM

## 2022-08-22 DIAGNOSIS — R39.198 DIFFICULTY VOIDING: ICD-10-CM

## 2022-08-22 DIAGNOSIS — Z79.899 ENCOUNTER FOR LONG-TERM (CURRENT) USE OF OTHER MEDICATIONS: ICD-10-CM

## 2022-08-22 DIAGNOSIS — K59.03 THERAPEUTIC OPIOID-INDUCED CONSTIPATION (OIC): Chronic | ICD-10-CM

## 2022-08-22 DIAGNOSIS — T40.2X5A THERAPEUTIC OPIOID-INDUCED CONSTIPATION (OIC): Chronic | ICD-10-CM

## 2022-08-22 DIAGNOSIS — M53.3 DISORDER OF SACRUM: Chronic | ICD-10-CM

## 2022-08-22 LAB

## 2022-08-22 PROCEDURE — 3288F PR FALLS RISK ASSESSMENT DOCUMENTED: ICD-10-PCS | Mod: CPTII,,, | Performed by: PHYSICIAN ASSISTANT

## 2022-08-22 PROCEDURE — 1125F AMNT PAIN NOTED PAIN PRSNT: CPT | Mod: CPTII,,, | Performed by: PHYSICIAN ASSISTANT

## 2022-08-22 PROCEDURE — 3078F PR MOST RECENT DIASTOLIC BLOOD PRESSURE < 80 MM HG: ICD-10-PCS | Mod: CPTII,,, | Performed by: PHYSICIAN ASSISTANT

## 2022-08-22 PROCEDURE — 1101F PR PT FALLS ASSESS DOC 0-1 FALLS W/OUT INJ PAST YR: ICD-10-PCS | Mod: CPTII,,, | Performed by: PHYSICIAN ASSISTANT

## 2022-08-22 PROCEDURE — 3008F BODY MASS INDEX DOCD: CPT | Mod: CPTII,,, | Performed by: PHYSICIAN ASSISTANT

## 2022-08-22 PROCEDURE — 1125F PR PAIN SEVERITY QUANTIFIED, PAIN PRESENT: ICD-10-PCS | Mod: CPTII,,, | Performed by: PHYSICIAN ASSISTANT

## 2022-08-22 PROCEDURE — 3074F SYST BP LT 130 MM HG: CPT | Mod: CPTII,,, | Performed by: PHYSICIAN ASSISTANT

## 2022-08-22 PROCEDURE — 3074F PR MOST RECENT SYSTOLIC BLOOD PRESSURE < 130 MM HG: ICD-10-PCS | Mod: CPTII,,, | Performed by: PHYSICIAN ASSISTANT

## 2022-08-22 PROCEDURE — 99214 PR OFFICE/OUTPT VISIT, EST, LEVL IV, 30-39 MIN: ICD-10-PCS | Mod: S$PBB,,, | Performed by: PHYSICIAN ASSISTANT

## 2022-08-22 PROCEDURE — 3078F DIAST BP <80 MM HG: CPT | Mod: CPTII,,, | Performed by: PHYSICIAN ASSISTANT

## 2022-08-22 PROCEDURE — 3008F PR BODY MASS INDEX (BMI) DOCUMENTED: ICD-10-PCS | Mod: CPTII,,, | Performed by: PHYSICIAN ASSISTANT

## 2022-08-22 PROCEDURE — 3288F FALL RISK ASSESSMENT DOCD: CPT | Mod: CPTII,,, | Performed by: PHYSICIAN ASSISTANT

## 2022-08-22 PROCEDURE — 80305 DRUG TEST PRSMV DIR OPT OBS: CPT | Mod: PBBFAC | Performed by: PHYSICIAN ASSISTANT

## 2022-08-22 PROCEDURE — 1101F PT FALLS ASSESS-DOCD LE1/YR: CPT | Mod: CPTII,,, | Performed by: PHYSICIAN ASSISTANT

## 2022-08-22 PROCEDURE — 99214 OFFICE O/P EST MOD 30 MIN: CPT | Mod: S$PBB,,, | Performed by: PHYSICIAN ASSISTANT

## 2022-08-22 PROCEDURE — 1159F PR MEDICATION LIST DOCUMENTED IN MEDICAL RECORD: ICD-10-PCS | Mod: CPTII,,, | Performed by: PHYSICIAN ASSISTANT

## 2022-08-22 PROCEDURE — 1159F MED LIST DOCD IN RCRD: CPT | Mod: CPTII,,, | Performed by: PHYSICIAN ASSISTANT

## 2022-08-22 PROCEDURE — 99214 OFFICE O/P EST MOD 30 MIN: CPT | Mod: PBBFAC | Performed by: PHYSICIAN ASSISTANT

## 2022-08-22 RX ORDER — NALOXEGOL OXALATE 25 MG/1
25 TABLET, FILM COATED ORAL DAILY
Qty: 30 TABLET | Refills: 1 | Status: SHIPPED | OUTPATIENT
Start: 2022-08-22 | End: 2023-03-29 | Stop reason: SDUPTHER

## 2022-08-22 RX ORDER — HYDROCODONE BITARTRATE AND ACETAMINOPHEN 7.5; 325 MG/1; MG/1
1 TABLET ORAL DAILY
Qty: 30 TABLET | Refills: 0 | Status: SHIPPED | OUTPATIENT
Start: 2022-09-21 | End: 2022-11-15 | Stop reason: SDUPTHER

## 2022-08-22 RX ORDER — HYDROCODONE BITARTRATE AND ACETAMINOPHEN 7.5; 325 MG/1; MG/1
1 TABLET ORAL DAILY
Qty: 30 TABLET | Refills: 0 | Status: SHIPPED | OUTPATIENT
Start: 2022-08-22 | End: 2022-11-15 | Stop reason: SDUPTHER

## 2022-08-22 RX ORDER — HYDROCODONE BITARTRATE AND ACETAMINOPHEN 7.5; 325 MG/1; MG/1
1 TABLET ORAL DAILY
Qty: 30 TABLET | Refills: 0 | Status: SHIPPED | OUTPATIENT
Start: 2022-10-21 | End: 2022-11-15 | Stop reason: SDUPTHER

## 2022-09-28 ENCOUNTER — TELEPHONE (OUTPATIENT)
Dept: UROLOGY | Facility: CLINIC | Age: 67
End: 2022-09-28
Payer: COMMERCIAL

## 2022-09-28 NOTE — TELEPHONE ENCOUNTER
I called and spoke with pt and told her the above message.  She asked me to call her daughter Jodi Rodriguez and inform her, because the daughter is over everything for her.  I called and spoke with Jodi and informed her of the above message.  She voiced understanding and will check on this medicine.

## 2022-11-15 NOTE — PROGRESS NOTES
Subjective:         Patient ID: Xavier Rodriguez is a 66 y.o. female.    Chief Complaint: Low-back Pain      Pain  This is a chronic problem. The current episode started more than 1 year ago. The problem occurs daily. The problem has been waxing and waning. Associated symptoms include arthralgias, headaches and neck pain. Pertinent negatives include no anorexia, change in bowel habit, chest pain, chills, fatigue, fever, rash, sore throat, swollen glands, urinary symptoms or vertigo.   Review of Systems   Constitutional:  Negative for activity change, appetite change, chills, fatigue, fever and unexpected weight change.   HENT:  Negative for drooling, ear discharge, facial swelling, mouth dryness, nosebleeds, sore throat, trouble swallowing, voice change and goiter.    Eyes:  Negative for discharge and visual disturbance.   Respiratory:  Negative for apnea, choking, chest tightness, shortness of breath, wheezing and stridor.    Cardiovascular:  Negative for chest pain, palpitations and leg swelling.   Gastrointestinal:  Negative for abdominal distention, anorexia, change in bowel habit, diarrhea, rectal pain, fecal incontinence and change in bowel habit.   Endocrine: Negative for cold intolerance, heat intolerance, polydipsia, polyphagia and polyuria.   Genitourinary:  Negative for flank pain, frequency and hot flashes.   Musculoskeletal:  Positive for arthralgias, leg pain and neck pain.   Integumentary:  Negative for color change, pallor and rash.   Allergic/Immunologic: Negative for immunocompromised state.   Neurological:  Positive for headaches. Negative for vertigo, syncope, facial asymmetry, speech difficulty, light-headedness, coordination difficulties, memory loss and coordination difficulties.   Hematological:  Negative for adenopathy. Does not bruise/bleed easily.   Psychiatric/Behavioral:  Negative for agitation, behavioral problems, confusion, decreased concentration, dysphoric mood, hallucinations,  "self-injury and suicidal ideas. The patient is not nervous/anxious and is not hyperactive.          Past Medical History:   Diagnosis Date    Chronic low back pain     Depression     Dysfunctional voiding of urine 11/10/2021    Patient states she does not urinate.  She was taken off of lasix which was the only thing that helped her empty. PVR 0 ml Chronic constipation, lifelong.  Has been out of Linzess for a month now. Renal function WNL when last checked in May    Edema     Hyperlipidemia     Hypertension     IBS (irritable bowel syndrome)      Past Surgical History:   Procedure Laterality Date    TUMOR REMOVAL       Social History     Socioeconomic History    Marital status: Single   Tobacco Use    Smoking status: Never    Smokeless tobacco: Never   Substance and Sexual Activity    Alcohol use: Never    Drug use: Never    Sexual activity: Not Currently     Family History   Problem Relation Age of Onset    Heart disease Mother     Heart attack Mother     Heart disease Father     Heart attack Father      Review of patient's allergies indicates:  No Known Allergies     Objective:  Vitals:    11/21/22 0945   BP: (!) 151/54   Pulse: 60   Resp: 16   Weight: 76.2 kg (168 lb)   Height: 5' 2" (1.575 m)   PainSc:   7         Physical Exam  Vitals and nursing note reviewed. Exam conducted with a chaperone present.   Constitutional:       General: She is awake. She is not in acute distress.     Appearance: Normal appearance. She is not ill-appearing, toxic-appearing or diaphoretic.   HENT:      Head: Normocephalic and atraumatic.      Nose: Nose normal.      Mouth/Throat:      Mouth: Mucous membranes are moist.      Pharynx: Oropharynx is clear.   Eyes:      Conjunctiva/sclera: Conjunctivae normal.      Pupils: Pupils are equal, round, and reactive to light.   Cardiovascular:      Rate and Rhythm: Normal rate.   Pulmonary:      Effort: Pulmonary effort is normal. No respiratory distress.   Abdominal:      Palpations: Abdomen " is soft.      Tenderness: There is no guarding.   Musculoskeletal:         General: Normal range of motion.      Cervical back: Normal range of motion and neck supple. No rigidity.      Thoracic back: Tenderness present.      Lumbar back: Tenderness present.   Skin:     General: Skin is warm and dry.      Coloration: Skin is not jaundiced or pale.   Neurological:      General: No focal deficit present.      Mental Status: She is alert and oriented to person, place, and time. Mental status is at baseline.      Cranial Nerves: No cranial nerve deficit (II-XII).   Psychiatric:         Mood and Affect: Mood normal.         Behavior: Behavior normal. Behavior is cooperative.         Thought Content: Thought content normal.         X-Ray Abdomen AP 1 View (KUB)  Narrative: EXAMINATION:  XR ABDOMEN AP 1 VIEW    CLINICAL HISTORY:  Constipation, unspecified    TECHNIQUE:  XR ABDOMEN AP 1 VIEW    COMPARISON:  Comparisons were reviewed, if available.    FINDINGS:  Lower lobes are clear    There is no bowel obstruction.  No free air.  No obvious renal calculi.  Mild colonic stool.    No acute bone findings.  Impression: As above    Electronically signed by: Wilmar Kelly  Date:    11/19/2021  Time:    20:32       Office Visit on 08/22/2022   Component Date Value Ref Range Status    POC Amphetamines 08/22/2022 Negative  Negative, Inconclusive Final    POC Barbiturates 08/22/2022 Negative  Negative, Inconclusive Final    POC Benzodiazepines 08/22/2022 Negative  Negative, Inconclusive Final    POC Cocaine 08/22/2022 Negative  Negative, Inconclusive Final    POC THC 08/22/2022 Negative  Negative, Inconclusive Final    POC Methadone 08/22/2022 Negative  Negative, Inconclusive Final    POC Methamphetamine 08/22/2022 Negative  Negative, Inconclusive Final    POC Opiates 08/22/2022 Negative  Negative, Inconclusive Final    POC Oxycodone 08/22/2022 Negative  Negative, Inconclusive Final    POC Phencyclidine 08/22/2022 Negative   Negative, Inconclusive Final    POC Methylenedioxymethamphetamine * 08/22/2022 Negative  Negative, Inconclusive Final    POC Tricyclic Antidepressants 08/22/2022 Negative  Negative, Inconclusive Final    POC Buprenorphine 08/22/2022 Negative   Final     Acceptable 08/22/2022 Yes   Final    POC Temperature (Urine) 08/22/2022 92   Final   Patient Outreach on 06/24/2022   Component Date Value Ref Range Status    CRC Recommendation External 05/13/2014 Repeat colonoscopy in 5 years   Final   Office Visit on 06/03/2022   Component Date Value Ref Range Status    Color, UA 06/03/2022 Yellow   Final    Spec Grav UA 06/03/2022 1.015   Final    pH, UA 06/03/2022 6.5   Final    WBC, UA 06/03/2022 trace   Final    Nitrite, UA 06/03/2022 negative   Final    Protein, POC 06/03/2022 trace   Final    Glucose, UA 06/03/2022 negative   Final    Ketones, UA 06/03/2022 negative   Final    Bilirubin, POC 06/03/2022 negative   Final    Urobilinogen, UA 06/03/2022 0.2   Final    Blood, UA 06/03/2022 negative   Final    Sodium 06/03/2022 142  136 - 145 mmol/L Final    Potassium 06/03/2022 3.3 (L)  3.5 - 5.1 mmol/L Final    Chloride 06/03/2022 104  98 - 107 mmol/L Final    CO2 06/03/2022 31  21 - 32 mmol/L Final    Anion Gap 06/03/2022 10  7 - 16 mmol/L Final    Glucose 06/03/2022 56 (L)  74 - 106 mg/dL Final    BUN 06/03/2022 9  7 - 18 mg/dL Final    Creatinine 06/03/2022 0.82  0.55 - 1.02 mg/dL Final    BUN/Creatinine Ratio 06/03/2022 11  6 - 20 Final    Calcium 06/03/2022 8.6  8.5 - 10.1 mg/dL Final    eGFR 06/03/2022 74  >=60 mL/min/1.73m² Final    Culture, Urine 06/03/2022 Mixed Skin/Urogenital Cristina Isolated, no further workup.   Final    WBC, UA 06/03/2022 0-5  None Seen, 0-5 /hpf Final    RBC, UA 06/03/2022 0-3  None Seen, 0-3 /hpf Final    Bacteria, UA 06/03/2022 Moderate (A)  None Seen, None Seen To Occasional, Rare /hpf Final    Yeast, UA 06/03/2022 None Seen  None Seen /hpf Final    Squamous Epithelial Cells, UA  06/03/2022 Moderate (A)  None Seen, Rare, None Seen To Occasional /lpf Final    Mucus, UA 06/03/2022 Many (A)  None Seen /hpf Final    Trichomonas, UA 06/03/2022 None Seen  None Seen /hpf Final   Admission on 06/01/2022, Discharged on 06/01/2022   Component Date Value Ref Range Status    Color, UA 06/01/2022 Yellow  Colorless, Straw, Yellow, Dark Yellow Final    Clarity, UA 06/01/2022 Slightly Cloudy (A)  Clear Final    pH, UA 06/01/2022 6.0  5.0, 5.5, 6.0, 6.5, 7.0, 7.5, 8.0 pH Units Final    Leukocytes, UA 06/01/2022 Negative  Negative Final    Nitrites, UA 06/01/2022 Negative  Negative Final    Protein, UA 06/01/2022 30 (A)  Negative Final    Glucose, UA 06/01/2022 Negative  Negative mg/dL Final    Ketones, UA 06/01/2022 Trace  Negative, Trace mg/dL Final    Urobilinogen, UA 06/01/2022 1.0  0.2, 1.0 mg/dL Final    Bilirubin, UA 06/01/2022 Small (A)  Negative Final    Blood, UA 06/01/2022 Negative  Negative Final    Specific Gravity, UA 06/01/2022 1.025  <=1.005, 1.010, 1.015, 1.020, 1.025, 1.030 Final    WBC, UA 06/01/2022 0-5  None Seen, 0-5 /hpf Final    RBC, UA 06/01/2022 0-3  None Seen, 0-3 /hpf Final    Bacteria, UA 06/01/2022 Few (A)  None Seen, None Seen To Occasional, Rare /hpf Final    Squamous Epithelial Cells, UA 06/01/2022 Few (A)  None Seen, Rare, None Seen To Occasional /lpf Final    Mucus, UA 06/01/2022 Loaded (A)  None Seen /hpf Final    Amorphous Crystals, UA 06/01/2022 Few (A)  None Seen /lpf Final   Office Visit on 05/25/2022   Component Date Value Ref Range Status    POC Amphetamines 05/25/2022 Negative  Negative, Inconclusive Final    POC Barbiturates 05/25/2022 Negative  Negative, Inconclusive Final    POC Benzodiazepines 05/25/2022 Negative  Negative, Inconclusive Final    POC Cocaine 05/25/2022 Negative  Negative, Inconclusive Final    POC THC 05/25/2022 Negative  Negative, Inconclusive Final    POC Methadone 05/25/2022 Negative  Negative, Inconclusive Final    POC Methamphetamine  05/25/2022 Negative  Negative, Inconclusive Final    POC Opiates 05/25/2022 Presumptive Positive (A)  Negative, Inconclusive Final    POC Oxycodone 05/25/2022 Negative  Negative, Inconclusive Final    POC Phencyclidine 05/25/2022 Negative  Negative, Inconclusive Final    POC Methylenedioxymethamphetamine * 05/25/2022 Negative  Negative, Inconclusive Final    POC Tricyclic Antidepressants 05/25/2022 Negative  Negative, Inconclusive Final    POC Buprenorphine 05/25/2022 Negative   Final     Acceptable 05/25/2022 Yes   Final    POC Temperature (Urine) 05/25/2022 94   Final         Orders Placed This Encounter   Procedures    Drugs of Abuse Screen, Blood     [facility] blood drug screen pain treatment     Standing Status:   Future     Number of Occurrences:   1     Standing Expiration Date:   2/21/2023    CBC Auto Differential     Standing Status:   Future     Standing Expiration Date:   2/21/2023    Comprehensive Metabolic Panel     Standing Status:   Future     Standing Expiration Date:   2/21/2023    C-Reactive Protein     Standing Status:   Future     Standing Expiration Date:   2/21/2023    Sedimentation Rate     Standing Status:   Future     Standing Expiration Date:   2/21/2023    Vitamin D     Standing Status:   Future     Standing Expiration Date:   2/21/2023       Requested Prescriptions     Signed Prescriptions Disp Refills    HYDROcodone-acetaminophen (NORCO) 7.5-325 mg per tablet 30 tablet 0     Sig: Take 1 tablet by mouth once daily.    HYDROcodone-acetaminophen (NORCO) 7.5-325 mg per tablet 30 tablet 0     Sig: Take 1 tablet by mouth once daily.    HYDROcodone-acetaminophen (NORCO) 7.5-325 mg per tablet 30 tablet 0     Sig: Take 1 tablet by mouth once daily.       Assessment:     1. Lumbosacral radiculopathy    2. Disorder of sacrum    3. Vitamin D deficiency         A's of Opioid Risk Assessment  Activity:Patient can perform ADL.   Analgesia:Patients pain is partially controlled by current  medication. Patient has tried OTC medications such as Tylenol and Ibuprofen with out relief.   Adverse Effects: Patient denies constipation or sedation.  Aberrant Behavior:  reviewed with no aberrant drug seeking/taking behavior.  Overdose reversal drug naloxone discussed    Drug screen reviewed      Plan:    December 5, 2022 vitamin-D level low 14. will start vitamin-D replacement    Definitive drug screen lost in transit after last visit    Patient states she is taking her medication as directed    Will order serum drug screen today    Recheck vitamin-D November 2022  She completed vitamin-D replacement as directed    Complaint of back and joint pain she states current medications helping    Will order CBC CMP ESR today    She would like to continue current medication is helping control her discomfort    Continue home exercise program as directed    Follow-up 3 months    Dr. Mendez, October 2022    Bring original prescription medication bottles/container/box with labels to each visit    Pill count    Physical therapy    Massage therapy declines

## 2022-11-21 ENCOUNTER — OFFICE VISIT (OUTPATIENT)
Dept: PAIN MEDICINE | Facility: CLINIC | Age: 67
End: 2022-11-21
Payer: COMMERCIAL

## 2022-11-21 VITALS
HEART RATE: 60 BPM | HEIGHT: 62 IN | WEIGHT: 168 LBS | DIASTOLIC BLOOD PRESSURE: 54 MMHG | BODY MASS INDEX: 30.91 KG/M2 | SYSTOLIC BLOOD PRESSURE: 151 MMHG | RESPIRATION RATE: 16 BRPM

## 2022-11-21 DIAGNOSIS — M53.3 DISORDER OF SACRUM: Chronic | ICD-10-CM

## 2022-11-21 DIAGNOSIS — M54.17 LUMBOSACRAL RADICULOPATHY: Primary | Chronic | ICD-10-CM

## 2022-11-21 DIAGNOSIS — E55.9 VITAMIN D DEFICIENCY: ICD-10-CM

## 2022-11-21 PROCEDURE — 1159F PR MEDICATION LIST DOCUMENTED IN MEDICAL RECORD: ICD-10-PCS | Mod: CPTII,,, | Performed by: PHYSICIAN ASSISTANT

## 2022-11-21 PROCEDURE — 3077F SYST BP >= 140 MM HG: CPT | Mod: CPTII,,, | Performed by: PHYSICIAN ASSISTANT

## 2022-11-21 PROCEDURE — 3288F FALL RISK ASSESSMENT DOCD: CPT | Mod: CPTII,,, | Performed by: PHYSICIAN ASSISTANT

## 2022-11-21 PROCEDURE — 3288F PR FALLS RISK ASSESSMENT DOCUMENTED: ICD-10-PCS | Mod: CPTII,,, | Performed by: PHYSICIAN ASSISTANT

## 2022-11-21 PROCEDURE — 1125F AMNT PAIN NOTED PAIN PRSNT: CPT | Mod: CPTII,,, | Performed by: PHYSICIAN ASSISTANT

## 2022-11-21 PROCEDURE — 3077F PR MOST RECENT SYSTOLIC BLOOD PRESSURE >= 140 MM HG: ICD-10-PCS | Mod: CPTII,,, | Performed by: PHYSICIAN ASSISTANT

## 2022-11-21 PROCEDURE — 1101F PR PT FALLS ASSESS DOC 0-1 FALLS W/OUT INJ PAST YR: ICD-10-PCS | Mod: CPTII,,, | Performed by: PHYSICIAN ASSISTANT

## 2022-11-21 PROCEDURE — 3008F PR BODY MASS INDEX (BMI) DOCUMENTED: ICD-10-PCS | Mod: CPTII,,, | Performed by: PHYSICIAN ASSISTANT

## 2022-11-21 PROCEDURE — 1125F PR PAIN SEVERITY QUANTIFIED, PAIN PRESENT: ICD-10-PCS | Mod: CPTII,,, | Performed by: PHYSICIAN ASSISTANT

## 2022-11-21 PROCEDURE — 3008F BODY MASS INDEX DOCD: CPT | Mod: CPTII,,, | Performed by: PHYSICIAN ASSISTANT

## 2022-11-21 PROCEDURE — 99214 PR OFFICE/OUTPT VISIT, EST, LEVL IV, 30-39 MIN: ICD-10-PCS | Mod: S$PBB,,, | Performed by: PHYSICIAN ASSISTANT

## 2022-11-21 PROCEDURE — 3078F DIAST BP <80 MM HG: CPT | Mod: CPTII,,, | Performed by: PHYSICIAN ASSISTANT

## 2022-11-21 PROCEDURE — 99214 OFFICE O/P EST MOD 30 MIN: CPT | Mod: PBBFAC | Performed by: PHYSICIAN ASSISTANT

## 2022-11-21 PROCEDURE — 3078F PR MOST RECENT DIASTOLIC BLOOD PRESSURE < 80 MM HG: ICD-10-PCS | Mod: CPTII,,, | Performed by: PHYSICIAN ASSISTANT

## 2022-11-21 PROCEDURE — 99214 OFFICE O/P EST MOD 30 MIN: CPT | Mod: S$PBB,,, | Performed by: PHYSICIAN ASSISTANT

## 2022-11-21 PROCEDURE — 1159F MED LIST DOCD IN RCRD: CPT | Mod: CPTII,,, | Performed by: PHYSICIAN ASSISTANT

## 2022-11-21 PROCEDURE — 1101F PT FALLS ASSESS-DOCD LE1/YR: CPT | Mod: CPTII,,, | Performed by: PHYSICIAN ASSISTANT

## 2022-11-21 RX ORDER — HYDROCODONE BITARTRATE AND ACETAMINOPHEN 7.5; 325 MG/1; MG/1
1 TABLET ORAL DAILY
Qty: 30 TABLET | Refills: 0 | Status: SHIPPED | OUTPATIENT
Start: 2022-12-23 | End: 2022-11-21

## 2022-11-21 RX ORDER — HYDROCODONE BITARTRATE AND ACETAMINOPHEN 7.5; 325 MG/1; MG/1
1 TABLET ORAL DAILY
Qty: 30 TABLET | Refills: 0 | Status: SHIPPED | OUTPATIENT
Start: 2023-02-01 | End: 2023-09-18

## 2022-11-21 RX ORDER — HYDROCODONE BITARTRATE AND ACETAMINOPHEN 7.5; 325 MG/1; MG/1
1 TABLET ORAL DAILY
Qty: 30 TABLET | Refills: 0 | Status: SHIPPED | OUTPATIENT
Start: 2022-12-03 | End: 2023-06-15 | Stop reason: SDUPTHER

## 2022-11-21 RX ORDER — HYDROCODONE BITARTRATE AND ACETAMINOPHEN 7.5; 325 MG/1; MG/1
1 TABLET ORAL DAILY
Qty: 30 TABLET | Refills: 0 | Status: SHIPPED | OUTPATIENT
Start: 2023-01-02 | End: 2023-10-23 | Stop reason: SDUPTHER

## 2022-12-02 ENCOUNTER — OFFICE VISIT (OUTPATIENT)
Dept: FAMILY MEDICINE | Facility: CLINIC | Age: 67
End: 2022-12-02
Payer: COMMERCIAL

## 2022-12-02 VITALS
RESPIRATION RATE: 20 BRPM | HEART RATE: 64 BPM | BODY MASS INDEX: 30.44 KG/M2 | DIASTOLIC BLOOD PRESSURE: 74 MMHG | WEIGHT: 165.38 LBS | SYSTOLIC BLOOD PRESSURE: 130 MMHG | TEMPERATURE: 98 F | HEIGHT: 62 IN | OXYGEN SATURATION: 97 %

## 2022-12-02 DIAGNOSIS — M53.3 DISORDER OF SACRUM: ICD-10-CM

## 2022-12-02 DIAGNOSIS — Z78.0 ENCOUNTER FOR OSTEOPOROSIS SCREENING IN ASYMPTOMATIC POSTMENOPAUSAL PATIENT: ICD-10-CM

## 2022-12-02 DIAGNOSIS — R63.4 WEIGHT LOSS: ICD-10-CM

## 2022-12-02 DIAGNOSIS — Z12.11 SCREENING FOR MALIGNANT NEOPLASM OF COLON: ICD-10-CM

## 2022-12-02 DIAGNOSIS — Z12.31 ENCOUNTER FOR SCREENING MAMMOGRAM FOR MALIGNANT NEOPLASM OF BREAST: ICD-10-CM

## 2022-12-02 DIAGNOSIS — K59.00 CONSTIPATION, UNSPECIFIED CONSTIPATION TYPE: Primary | ICD-10-CM

## 2022-12-02 DIAGNOSIS — Z13.820 ENCOUNTER FOR OSTEOPOROSIS SCREENING IN ASYMPTOMATIC POSTMENOPAUSAL PATIENT: ICD-10-CM

## 2022-12-02 DIAGNOSIS — E78.5 HYPERLIPIDEMIA, UNSPECIFIED HYPERLIPIDEMIA TYPE: ICD-10-CM

## 2022-12-02 DIAGNOSIS — E55.9 VITAMIN D DEFICIENCY: ICD-10-CM

## 2022-12-02 DIAGNOSIS — M54.17 LUMBOSACRAL RADICULOPATHY: ICD-10-CM

## 2022-12-02 LAB
25(OH)D3 SERPL-MCNC: 14 NG/ML
ALBUMIN SERPL BCP-MCNC: 3.9 G/DL (ref 3.5–5)
ALBUMIN/GLOB SERPL: 1.1 {RATIO}
ALP SERPL-CCNC: 86 U/L (ref 55–142)
ALT SERPL W P-5'-P-CCNC: 13 U/L (ref 13–56)
ANION GAP SERPL CALCULATED.3IONS-SCNC: 5 MMOL/L (ref 7–16)
AST SERPL W P-5'-P-CCNC: 11 U/L (ref 15–37)
BASOPHILS # BLD AUTO: 0.01 K/UL (ref 0–0.2)
BASOPHILS NFR BLD AUTO: 0.2 % (ref 0–1)
BILIRUB SERPL-MCNC: 0.9 MG/DL (ref ?–1.2)
BUN SERPL-MCNC: 12 MG/DL (ref 7–18)
BUN/CREAT SERPL: 13 (ref 6–20)
CALCIUM SERPL-MCNC: 9.3 MG/DL (ref 8.5–10.1)
CHLORIDE SERPL-SCNC: 103 MMOL/L (ref 98–107)
CHOLEST SERPL-MCNC: 302 MG/DL (ref 0–200)
CHOLEST/HDLC SERPL: 4.7 {RATIO}
CO2 SERPL-SCNC: 36 MMOL/L (ref 21–32)
CREAT SERPL-MCNC: 0.9 MG/DL (ref 0.55–1.02)
CRP SERPL-MCNC: <0.2 MG/DL (ref 0–0.8)
DIFFERENTIAL METHOD BLD: ABNORMAL
EGFR (NO RACE VARIABLE) (RUSH/TITUS): 70 ML/MIN/1.73M²
EOSINOPHIL # BLD AUTO: 0.01 K/UL (ref 0–0.5)
EOSINOPHIL NFR BLD AUTO: 0.2 % (ref 1–4)
ERYTHROCYTE [DISTWIDTH] IN BLOOD BY AUTOMATED COUNT: 13.7 % (ref 11.5–14.5)
ERYTHROCYTE [SEDIMENTATION RATE] IN BLOOD BY WESTERGREN METHOD: 15 MM/HR (ref 0–30)
GLOBULIN SER-MCNC: 3.7 G/DL (ref 2–4)
GLUCOSE SERPL-MCNC: 96 MG/DL (ref 74–106)
HCT VFR BLD AUTO: 37.6 % (ref 38–47)
HDLC SERPL-MCNC: 64 MG/DL (ref 40–60)
HGB BLD-MCNC: 12.5 G/DL (ref 12–16)
IMM GRANULOCYTES # BLD AUTO: 0.02 K/UL (ref 0–0.04)
IMM GRANULOCYTES NFR BLD: 0.4 % (ref 0–0.4)
LDLC SERPL CALC-MCNC: 211 MG/DL
LDLC/HDLC SERPL: 3.3 {RATIO}
LYMPHOCYTES # BLD AUTO: 1.98 K/UL (ref 1–4.8)
LYMPHOCYTES NFR BLD AUTO: 41.9 % (ref 27–41)
MCH RBC QN AUTO: 28.7 PG (ref 27–31)
MCHC RBC AUTO-ENTMCNC: 33.2 G/DL (ref 32–36)
MCV RBC AUTO: 86.2 FL (ref 80–96)
MONOCYTES # BLD AUTO: 0.23 K/UL (ref 0–0.8)
MONOCYTES NFR BLD AUTO: 4.9 % (ref 2–6)
MPC BLD CALC-MCNC: 11.6 FL (ref 9.4–12.4)
NEUTROPHILS # BLD AUTO: 2.48 K/UL (ref 1.8–7.7)
NEUTROPHILS NFR BLD AUTO: 52.4 % (ref 53–65)
NONHDLC SERPL-MCNC: 238 MG/DL
NRBC # BLD AUTO: 0 X10E3/UL
NRBC, AUTO (.00): 0 %
PLATELET # BLD AUTO: 284 K/UL (ref 150–400)
POTASSIUM SERPL-SCNC: 2.7 MMOL/L (ref 3.5–5.1)
PROT SERPL-MCNC: 7.6 G/DL (ref 6.4–8.2)
RBC # BLD AUTO: 4.36 M/UL (ref 4.2–5.4)
SODIUM SERPL-SCNC: 141 MMOL/L (ref 136–145)
TRIGL SERPL-MCNC: 133 MG/DL (ref 35–150)
VLDLC SERPL-MCNC: 27 MG/DL
WBC # BLD AUTO: 4.73 K/UL (ref 4.5–11)

## 2022-12-02 PROCEDURE — 80053 COMPREHEN METABOLIC PANEL: CPT | Mod: ,,, | Performed by: CLINICAL MEDICAL LABORATORY

## 2022-12-02 PROCEDURE — 3008F PR BODY MASS INDEX (BMI) DOCUMENTED: ICD-10-PCS | Mod: ,,, | Performed by: NURSE PRACTITIONER

## 2022-12-02 PROCEDURE — 1159F MED LIST DOCD IN RCRD: CPT | Mod: ,,, | Performed by: NURSE PRACTITIONER

## 2022-12-02 PROCEDURE — 85651 SEDIMENTATION RATE, AUTOMATED: ICD-10-PCS | Mod: ,,, | Performed by: CLINICAL MEDICAL LABORATORY

## 2022-12-02 PROCEDURE — 3288F FALL RISK ASSESSMENT DOCD: CPT | Mod: ,,, | Performed by: NURSE PRACTITIONER

## 2022-12-02 PROCEDURE — 1126F AMNT PAIN NOTED NONE PRSNT: CPT | Mod: ,,, | Performed by: NURSE PRACTITIONER

## 2022-12-02 PROCEDURE — 85025 COMPLETE CBC W/AUTO DIFF WBC: CPT | Mod: ,,, | Performed by: CLINICAL MEDICAL LABORATORY

## 2022-12-02 PROCEDURE — 80061 LIPID PANEL: ICD-10-PCS | Mod: ,,, | Performed by: CLINICAL MEDICAL LABORATORY

## 2022-12-02 PROCEDURE — 80061 LIPID PANEL: CPT | Mod: ,,, | Performed by: CLINICAL MEDICAL LABORATORY

## 2022-12-02 PROCEDURE — 99214 PR OFFICE/OUTPT VISIT, EST, LEVL IV, 30-39 MIN: ICD-10-PCS | Mod: ,,, | Performed by: NURSE PRACTITIONER

## 2022-12-02 PROCEDURE — 3008F BODY MASS INDEX DOCD: CPT | Mod: ,,, | Performed by: NURSE PRACTITIONER

## 2022-12-02 PROCEDURE — 82306 VITAMIN D 25 HYDROXY: CPT | Mod: ,,, | Performed by: CLINICAL MEDICAL LABORATORY

## 2022-12-02 PROCEDURE — 1100F PTFALLS ASSESS-DOCD GE2>/YR: CPT | Mod: ,,, | Performed by: NURSE PRACTITIONER

## 2022-12-02 PROCEDURE — 85651 RBC SED RATE NONAUTOMATED: CPT | Mod: ,,, | Performed by: CLINICAL MEDICAL LABORATORY

## 2022-12-02 PROCEDURE — 3075F PR MOST RECENT SYSTOLIC BLOOD PRESS GE 130-139MM HG: ICD-10-PCS | Mod: ,,, | Performed by: NURSE PRACTITIONER

## 2022-12-02 PROCEDURE — 3078F DIAST BP <80 MM HG: CPT | Mod: ,,, | Performed by: NURSE PRACTITIONER

## 2022-12-02 PROCEDURE — 99214 OFFICE O/P EST MOD 30 MIN: CPT | Mod: ,,, | Performed by: NURSE PRACTITIONER

## 2022-12-02 PROCEDURE — 1100F PR PT FALLS ASSESS DOC 2+ FALLS/FALL W/INJURY/YR: ICD-10-PCS | Mod: ,,, | Performed by: NURSE PRACTITIONER

## 2022-12-02 PROCEDURE — 86140 C-REACTIVE PROTEIN: CPT | Mod: ,,, | Performed by: CLINICAL MEDICAL LABORATORY

## 2022-12-02 PROCEDURE — 80307 DRUG TEST PRSMV CHEM ANLYZR: CPT | Performed by: PHYSICIAN ASSISTANT

## 2022-12-02 PROCEDURE — 3288F PR FALLS RISK ASSESSMENT DOCUMENTED: ICD-10-PCS | Mod: ,,, | Performed by: NURSE PRACTITIONER

## 2022-12-02 PROCEDURE — 3075F SYST BP GE 130 - 139MM HG: CPT | Mod: ,,, | Performed by: NURSE PRACTITIONER

## 2022-12-02 PROCEDURE — 1159F PR MEDICATION LIST DOCUMENTED IN MEDICAL RECORD: ICD-10-PCS | Mod: ,,, | Performed by: NURSE PRACTITIONER

## 2022-12-02 PROCEDURE — 1160F PR REVIEW ALL MEDS BY PRESCRIBER/CLIN PHARMACIST DOCUMENTED: ICD-10-PCS | Mod: ,,, | Performed by: NURSE PRACTITIONER

## 2022-12-02 PROCEDURE — 85025 CBC WITH DIFFERENTIAL: ICD-10-PCS | Mod: ,,, | Performed by: CLINICAL MEDICAL LABORATORY

## 2022-12-02 PROCEDURE — 86140 C-REACTIVE PROTEIN: ICD-10-PCS | Mod: ,,, | Performed by: CLINICAL MEDICAL LABORATORY

## 2022-12-02 PROCEDURE — 82306 VITAMIN D: ICD-10-PCS | Mod: ,,, | Performed by: CLINICAL MEDICAL LABORATORY

## 2022-12-02 PROCEDURE — 3078F PR MOST RECENT DIASTOLIC BLOOD PRESSURE < 80 MM HG: ICD-10-PCS | Mod: ,,, | Performed by: NURSE PRACTITIONER

## 2022-12-02 PROCEDURE — 1160F RVW MEDS BY RX/DR IN RCRD: CPT | Mod: ,,, | Performed by: NURSE PRACTITIONER

## 2022-12-02 PROCEDURE — 80053 COMPREHENSIVE METABOLIC PANEL: ICD-10-PCS | Mod: ,,, | Performed by: CLINICAL MEDICAL LABORATORY

## 2022-12-02 PROCEDURE — 1126F PR PAIN SEVERITY QUANTIFIED, NO PAIN PRESENT: ICD-10-PCS | Mod: ,,, | Performed by: NURSE PRACTITIONER

## 2022-12-02 NOTE — PROGRESS NOTES
New clinic note    Xavier Rodriguez is a 67 y.o. female     Chief Complaint:   Chief Complaint   Patient presents with    Weight Loss    Dizziness    Constipation        Subjective:    Patient comes in today with daughter. Patient reports weight loss, constipation, and skin changes. Daughter states skin appears darker. Patient states she has lost 3 lbs recently. Patient states she has noticed her clothes are too big. Denies change in appetite. Patient does admit to constipation. Patient states she has to take laxatives to have a bm. Patient states she use to take linzess 145mcg but still required otc laxative with it for bm results. Patient denies any abdominal pain.  Patient states she goes to pain treatment routinely. Daughter wondering if pcp can manage chronic pain due to difficulty traveling to appointment. Patient has been on same dose of norco for years. Denies any procedural pain management.      Allergies:   Review of patient's allergies indicates:  No Known Allergies     Past Medical History:  Past Medical History:   Diagnosis Date    Chronic low back pain     Depression     Dysfunctional voiding of urine 11/10/2021    Patient states she does not urinate.  She was taken off of lasix which was the only thing that helped her empty. PVR 0 ml Chronic constipation, lifelong.  Has been out of Linzess for a month now. Renal function WNL when last checked in May    Edema     Hyperlipidemia     Hypertension     IBS (irritable bowel syndrome)         Current Medications:    Current Outpatient Medications:     [START ON 1/2/2023] HYDROcodone-acetaminophen (NORCO) 7.5-325 mg per tablet, Take 1 tablet by mouth once daily., Disp: 30 tablet, Rfl: 0    [START ON 2/1/2023] HYDROcodone-acetaminophen (NORCO) 7.5-325 mg per tablet, Take 1 tablet by mouth once daily., Disp: 30 tablet, Rfl: 0    HYDROcodone-acetaminophen (NORCO) 7.5-325 mg per tablet, Take 1 tablet by mouth once daily., Disp: 30 tablet, Rfl: 0    naloxegoL  "(MOVANTIK) 25 mg tablet, Take 25 mg by mouth once daily., Disp: 30 tablet, Rfl: 1    tamsulosin (FLOMAX) 0.4 mg Cap, , Disp: , Rfl:     amitriptyline (ELAVIL) 25 MG tablet, Take 25 mg by mouth., Disp: , Rfl:     ergocalciferol (VITAMIN D2) 50,000 unit Cap, Take 1 capsule (50,000 Units total) by mouth every 7 days. for 8 doses, Disp: 8 capsule, Rfl: 0    furosemide (LASIX) 40 MG tablet, Take 40 mg by mouth., Disp: , Rfl:     linaCLOtide (LINZESS) 290 mcg Cap capsule, Take 1 capsule (290 mcg total) by mouth daily as needed., Disp: 30 capsule, Rfl: 2    lovastatin (MEVACOR) 20 MG tablet, Take 1 tablet (20 mg total) by mouth every evening., Disp: 90 tablet, Rfl: 1    naproxen (NAPROSYN) 500 MG tablet, TAKE 1 TABLET BY MOUTH EVERY DAY, Disp: 30 tablet, Rfl: 0    topiramate 25 mg capsule, Take 25 mg by mouth., Disp: , Rfl:        Review of Systems   Constitutional:  Positive for unexpected weight change. Negative for appetite change and fever.   Respiratory:  Negative for cough and shortness of breath.    Cardiovascular:  Negative for chest pain.   Gastrointestinal:  Positive for constipation. Negative for abdominal pain, diarrhea, nausea and vomiting.        Objective:    /74 (BP Location: Right arm, Patient Position: Sitting, BP Method: Medium (Manual))   Pulse 64   Temp 97.6 °F (36.4 °C) (Skin)   Resp 20   Ht 5' 2" (1.575 m)   Wt 75 kg (165 lb 6.4 oz)   SpO2 97%   BMI 30.25 kg/m²      Physical Exam  Eyes:      Extraocular Movements: Extraocular movements intact.   Cardiovascular:      Rate and Rhythm: Normal rate and regular rhythm.      Pulses: Normal pulses.      Heart sounds: Normal heart sounds.   Pulmonary:      Effort: Pulmonary effort is normal.      Breath sounds: Normal breath sounds.   Abdominal:      Palpations: Abdomen is soft.      Tenderness: There is no abdominal tenderness.   Musculoskeletal:      Right lower leg: No edema.      Left lower leg: No edema.   Neurological:      Mental Status: " She is alert and oriented to person, place, and time.        Assessment and Plan:    1. Constipation, unspecified constipation type    2. Hyperlipidemia, unspecified hyperlipidemia type    3. Disorder of sacrum    4. Lumbosacral radiculopathy    5. Vitamin D deficiency    6. Weight loss    7. Screening for malignant neoplasm of colon    8. Encounter for screening mammogram for malignant neoplasm of breast    9. Encounter for osteoporosis screening in asymptomatic postmenopausal patient         Constipation, unspecified constipation type  -     linaCLOtide (LINZESS) 290 mcg Cap capsule; Take 1 capsule (290 mcg total) by mouth daily as needed.  Dispense: 30 capsule; Refill: 2  -increased dose from 145mcg prn  -patient takes norco routinely     Hyperlipidemia, unspecified hyperlipidemia type  -     Lipid Panel; Future; Expected date: 12/02/2022  -     lovastatin (MEVACOR) 20 MG tablet; Take 1 tablet (20 mg total) by mouth every evening.  Dispense: 90 tablet; Refill: 1  -low cholesterol diet    Disorder of sacrum  -     CBC Auto Differential  -     Comprehensive Metabolic Panel  -     C-Reactive Protein  -     Sedimentation Rate    Lumbosacral radiculopathy  -     CBC Auto Differential  -     Comprehensive Metabolic Panel  -     C-Reactive Protein  -     Sedimentation Rate    Vitamin D deficiency  -     Vitamin D    Weight loss  -no significant weight loss noted past 6 months. At previous visit 6-3-22 patient was 167 lbs      Screening for malignant neoplasm of colon  -     Ambulatory referral/consult to General Surgery; Future; Expected date: 12/12/2022  -denies hx of c-scope    Encounter for screening mammogram for malignant neoplasm of breast  -     Mammo Digital Screening Bilat; Future; Expected date: 12/05/2022    Encounter for osteoporosis screening in asymptomatic postmenopausal patient  -     DXA Bone Density Spine And Hip; Future; Expected date: 12/05/2022       -bring all meds to appointment to update med  list  -discussed care gaps. Ordered screening exam    There are no Patient Instructions on file for this visit.   Follow up in about 3 months (around 3/2/2023), or if symptoms worsen or fail to improve.     Chart reviewed.  Roslyn iMttal MD

## 2022-12-05 PROBLEM — M54.17 LUMBOSACRAL RADICULOPATHY: Status: ACTIVE | Noted: 2022-12-05

## 2022-12-05 PROBLEM — E78.5 HYPERLIPIDEMIA: Status: ACTIVE | Noted: 2022-12-05

## 2022-12-05 PROBLEM — K59.00 CONSTIPATION: Status: ACTIVE | Noted: 2022-12-05

## 2022-12-05 RX ORDER — ERGOCALCIFEROL 1.25 MG/1
50000 CAPSULE ORAL
Qty: 8 CAPSULE | Refills: 0 | Status: SHIPPED | OUTPATIENT
Start: 2022-12-05 | End: 2023-01-24

## 2022-12-05 RX ORDER — LOVASTATIN 20 MG/1
20 TABLET ORAL NIGHTLY
Qty: 90 TABLET | Refills: 1 | Status: SHIPPED | OUTPATIENT
Start: 2022-12-05 | End: 2023-04-18

## 2022-12-07 DIAGNOSIS — E87.6 HYPOKALEMIA: Primary | ICD-10-CM

## 2022-12-07 LAB — BEAKER SEE SCANNED REPORT: NORMAL

## 2022-12-14 ENCOUNTER — TELEPHONE (OUTPATIENT)
Dept: PAIN MEDICINE | Facility: CLINIC | Age: 67
End: 2022-12-14
Payer: COMMERCIAL

## 2022-12-14 NOTE — PROGRESS NOTES
Subjective:         Patient ID: Xavier Rodriguez is a 67 y.o. female.    Chief Complaint: Abdominal Pain        Pain  This is a chronic problem. The current episode started more than 1 year ago. The problem occurs daily. The problem has been unchanged. Associated symptoms include arthralgias, headaches and neck pain. Pertinent negatives include no anorexia, change in bowel habit, chest pain, chills, fever, sore throat, swollen glands, urinary symptoms or vertigo.   Review of Systems   Constitutional:  Negative for activity change, appetite change, chills, fever and unexpected weight change.   HENT:  Negative for drooling, ear discharge, facial swelling, mouth dryness, nosebleeds, sore throat, trouble swallowing, voice change and goiter.    Eyes:  Negative for discharge and visual disturbance.   Respiratory:  Negative for apnea, choking, chest tightness, shortness of breath, wheezing and stridor.    Cardiovascular:  Negative for chest pain, palpitations and leg swelling.   Gastrointestinal:  Negative for abdominal distention, anorexia, change in bowel habit, diarrhea, rectal pain, fecal incontinence and change in bowel habit.   Endocrine: Negative for cold intolerance, heat intolerance, polydipsia, polyphagia and polyuria.   Genitourinary:  Negative for flank pain, frequency and hot flashes.   Musculoskeletal:  Positive for arthralgias, leg pain and neck pain.   Integumentary:  Negative for color change and pallor.   Allergic/Immunologic: Negative for immunocompromised state.   Neurological:  Positive for headaches. Negative for vertigo, syncope, facial asymmetry, speech difficulty, light-headedness, coordination difficulties, memory loss and coordination difficulties.   Hematological:  Negative for adenopathy. Does not bruise/bleed easily.   Psychiatric/Behavioral:  Negative for agitation, behavioral problems, confusion, decreased concentration, dysphoric mood, hallucinations, self-injury and suicidal ideas. The  "patient is not nervous/anxious and is not hyperactive.          Past Medical History:   Diagnosis Date    Chronic low back pain     Depression     Dysfunctional voiding of urine 11/10/2021    Patient states she does not urinate.  She was taken off of lasix which was the only thing that helped her empty. PVR 0 ml Chronic constipation, lifelong.  Has been out of Linzess for a month now. 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    TUMOR REMOVAL       Social History     Socioeconomic History    Marital status: Single   Tobacco Use    Smoking status: Former     Packs/day: 0.00     Years: 0.00     Pack years: 0.00     Types: Cigarettes     Passive exposure: Past    Smokeless tobacco: Never    Tobacco comments:     Stop smoking about 15 years ago   Substance and Sexual Activity    Alcohol use: Never    Drug use: Never    Sexual activity: Not Currently     Partners: Male     Birth control/protection: None     Family History   Problem Relation Age of Onset    Heart disease Mother     Heart attack Mother     Heart disease Father         Heart attack    Heart attack Father      Review of patient's allergies indicates:  No Known Allergies     Objective:  Vitals:    12/15/22 1003   BP: (!) 149/51   Pulse: (!) 58   Weight: 76.7 kg (169 lb)   Height: 5' 2" (1.575 m)   PainSc:   7         Physical Exam  Vitals and nursing note reviewed. Exam conducted with a chaperone present.   Constitutional:       General: She is awake. She is not in acute distress.     Appearance: Normal appearance. She is not ill-appearing, toxic-appearing or diaphoretic.   HENT:      Head: Normocephalic and atraumatic.      Nose: Nose normal.      Mouth/Throat:      Mouth: Mucous membranes are moist.      Pharynx: Oropharynx is clear.   Eyes:      " Conjunctiva/sclera: Conjunctivae normal.      Pupils: Pupils are equal, round, and reactive to light.   Cardiovascular:      Rate and Rhythm: Normal rate.   Pulmonary:      Effort: Pulmonary effort is normal. No respiratory distress.   Abdominal:      Palpations: Abdomen is soft.      Tenderness: There is no guarding.   Musculoskeletal:         General: Normal range of motion.      Cervical back: Normal range of motion and neck supple. No rigidity.      Thoracic back: Tenderness present.      Lumbar back: Tenderness present.   Skin:     General: Skin is warm and dry.      Coloration: Skin is not jaundiced or pale.   Neurological:      General: No focal deficit present.      Mental Status: She is alert and oriented to person, place, and time. Mental status is at baseline.      Cranial Nerves: No cranial nerve deficit (II-XII).   Psychiatric:         Mood and Affect: Mood normal.         Behavior: Behavior normal. Behavior is cooperative.         Thought Content: Thought content normal.         X-Ray Abdomen AP 1 View (KUB)  Narrative: EXAMINATION:  XR ABDOMEN AP 1 VIEW    CLINICAL HISTORY:  Constipation, unspecified    TECHNIQUE:  XR ABDOMEN AP 1 VIEW    COMPARISON:  Comparisons were reviewed, if available.    FINDINGS:  Lower lobes are clear    There is no bowel obstruction.  No free air.  No obvious renal calculi.  Mild colonic stool.    No acute bone findings.  Impression: As above    Electronically signed by: Wilmar Kelly  Date:    11/19/2021  Time:    20:32         Office Visit on 12/02/2022   Component Date Value Ref Range Status    Sodium 12/02/2022 141  136 - 145 mmol/L Final    Potassium 12/02/2022 2.7 (L)  3.5 - 5.1 mmol/L Final    Chloride 12/02/2022 103  98 - 107 mmol/L Final    CO2 12/02/2022 36 (H)  21 - 32 mmol/L Final    Anion Gap 12/02/2022 5 (L)  7 - 16 mmol/L Final    Glucose 12/02/2022 96  74 - 106 mg/dL Final    BUN 12/02/2022 12  7 - 18 mg/dL Final    Creatinine 12/02/2022 0.90  0.55 - 1.02  mg/dL Final    BUN/Creatinine Ratio 12/02/2022 13  6 - 20 Final    Calcium 12/02/2022 9.3  8.5 - 10.1 mg/dL Final    Total Protein 12/02/2022 7.6  6.4 - 8.2 g/dL Final    Albumin 12/02/2022 3.9  3.5 - 5.0 g/dL Final    Globulin 12/02/2022 3.7  2.0 - 4.0 g/dL Final    A/G Ratio 12/02/2022 1.1   Final    Bilirubin, Total 12/02/2022 0.9  >0.0 - 1.2 mg/dL Final    Alk Phos 12/02/2022 86  55 - 142 U/L Final    ALT 12/02/2022 13  13 - 56 U/L Final    AST 12/02/2022 11 (L)  15 - 37 U/L Final    eGFR 12/02/2022 70  >=60 mL/min/1.73m² Final    CRP 12/02/2022 <0.20  0.00 - 0.80 mg/dL Final    ESR Westergren 12/02/2022 15  0 - 30 mm/Hr Final    Vitamin D 25-Hydroxy, Blood 12/02/2022 14.0  ng/mL Final    Triglycerides 12/02/2022 133  35 - 150 mg/dL Final    Cholesterol 12/02/2022 302 (H)  0 - 200 mg/dL Final    HDL Cholesterol 12/02/2022 64 (H)  40 - 60 mg/dL Final    Cholesterol/HDL Ratio (Risk Factor) 12/02/2022 4.7   Final    Non-HDL 12/02/2022 238  mg/dL Final    LDL Calculated 12/02/2022 211  mg/dL Final    LDL/HDL 12/02/2022 3.3   Final    VLDL 12/02/2022 27  mg/dL Final    WBC 12/02/2022 4.73  4.50 - 11.00 K/uL Final    RBC 12/02/2022 4.36  4.20 - 5.40 M/uL Final    Hemoglobin 12/02/2022 12.5  12.0 - 16.0 g/dL Final    Hematocrit 12/02/2022 37.6 (L)  38.0 - 47.0 % Final    MCV 12/02/2022 86.2  80.0 - 96.0 fL Final    MCH 12/02/2022 28.7  27.0 - 31.0 pg Final    MCHC 12/02/2022 33.2  32.0 - 36.0 g/dL Final    RDW 12/02/2022 13.7  11.5 - 14.5 % Final    Platelet Count 12/02/2022 284  150 - 400 K/uL Final    MPV 12/02/2022 11.6  9.4 - 12.4 fL Final    Neutrophils % 12/02/2022 52.4 (L)  53.0 - 65.0 % Final    Lymphocytes % 12/02/2022 41.9 (H)  27.0 - 41.0 % Final    Monocytes % 12/02/2022 4.9  2.0 - 6.0 % Final    Eosinophils % 12/02/2022 0.2 (L)  1.0 - 4.0 % Final    Basophils % 12/02/2022 0.2  0.0 - 1.0 % Final    Immature Granulocytes % 12/02/2022 0.4  0.0 - 0.4 % Final    nRBC, Auto 12/02/2022 0.0  <=0.0 % Final     Neutrophils, Abs 12/02/2022 2.48  1.80 - 7.70 K/uL Final    Lymphocytes, Absolute 12/02/2022 1.98  1.00 - 4.80 K/uL Final    Monocytes, Absolute 12/02/2022 0.23  0.00 - 0.80 K/uL Final    Eosinophils, Absolute 12/02/2022 0.01  0.00 - 0.50 K/uL Final    Basophils, Absolute 12/02/2022 0.01  0.00 - 0.20 K/uL Final    Immature Granulocytes, Absolute 12/02/2022 0.02  0.00 - 0.04 K/uL Final    nRBC, Absolute 12/02/2022 0.00  <=0.00 x10e3/uL Final    Diff Type 12/02/2022 Auto   Final   Office Visit on 11/21/2022   Component Date Value Ref Range Status    See Scanned Report 12/02/2022 See Scanned Result   Final   Office Visit on 08/22/2022   Component Date Value Ref Range Status    POC Amphetamines 08/22/2022 Negative  Negative, Inconclusive Final    POC Barbiturates 08/22/2022 Negative  Negative, Inconclusive Final    POC Benzodiazepines 08/22/2022 Negative  Negative, Inconclusive Final    POC Cocaine 08/22/2022 Negative  Negative, Inconclusive Final    POC THC 08/22/2022 Negative  Negative, Inconclusive Final    POC Methadone 08/22/2022 Negative  Negative, Inconclusive Final    POC Methamphetamine 08/22/2022 Negative  Negative, Inconclusive Final    POC Opiates 08/22/2022 Negative  Negative, Inconclusive Final    POC Oxycodone 08/22/2022 Negative  Negative, Inconclusive Final    POC Phencyclidine 08/22/2022 Negative  Negative, Inconclusive Final    POC Methylenedioxymethamphetamine * 08/22/2022 Negative  Negative, Inconclusive Final    POC Tricyclic Antidepressants 08/22/2022 Negative  Negative, Inconclusive Final    POC Buprenorphine 08/22/2022 Negative   Final     Acceptable 08/22/2022 Yes   Final    POC Temperature (Urine) 08/22/2022 92   Final   Patient Outreach on 06/24/2022   Component Date Value Ref Range Status    CRC Recommendation External 05/13/2014 Repeat colonoscopy in 5 years   Final         Orders Placed This Encounter   Procedures    POCT Urine Drug Screen Presump     Interpretive  Information:     Negative:  No drug detected at the cut off level.   Positive:  This result represents presumptive positive for the   tested drug, other substances may yield a positive response other   than the analyte of interest. This result should be utilized for   diagnostic purpose only. Confirmation testing will be performed upon physician request only.          Requested Prescriptions      No prescriptions requested or ordered in this encounter       Assessment:     1. Encounter for long-term (current) use of other medications         A's of Opioid Risk Assessment  Activity:Patient can perform ADL.   Analgesia:Patients pain is partially controlled by current medication. Patient has tried OTC medications such as Tylenol and Ibuprofen with out relief.   Adverse Effects: Patient denies constipation or sedation.  Aberrant Behavior:  reviewed with no aberrant drug seeking/taking behavior.  Overdose reversal drug naloxone discussed    Drug screen reviewed      Plan:    December 5, 2022 vitamin-D level low 14. started vitamin-D replacement    Definitive drug screen December 5, 2022 collected December 2, 2022 was negative     CBC CMP reviewed    ESR CRP normal    Last refill date Norco 7.5 number 30 tablets November 3, 2022 drug screen was collected December 2, 2022    Drug screen consistent today pill count correct today     Patient states she will take her medication as directed on a daily basis     Her daughter is with her today she states she will monitor her mother's medication    Recheck vitamin-D November 2022  She completed vitamin-D replacement as directed    She would like to continue current medication is helping control her discomfort    Continue home exercise program as directed    Follow-up 3 months    Dr. Mendez, October 2022    Bring original prescription medication bottles/container/box with labels to each visit    Pill count    Physical therapy    Massage therapy declines

## 2022-12-14 NOTE — TELEPHONE ENCOUNTER
KENDALL PEGUERO   12/14/2022   12:18 PM   Spoke with patients daughter, instructed need to come in for pill count tomorrow. Voiced understanding.

## 2022-12-15 ENCOUNTER — OFFICE VISIT (OUTPATIENT)
Dept: PAIN MEDICINE | Facility: CLINIC | Age: 67
End: 2022-12-15
Payer: COMMERCIAL

## 2022-12-15 VITALS
SYSTOLIC BLOOD PRESSURE: 149 MMHG | BODY MASS INDEX: 31.1 KG/M2 | WEIGHT: 169 LBS | DIASTOLIC BLOOD PRESSURE: 51 MMHG | HEART RATE: 58 BPM | HEIGHT: 62 IN

## 2022-12-15 DIAGNOSIS — Z79.899 ENCOUNTER FOR LONG-TERM (CURRENT) USE OF OTHER MEDICATIONS: ICD-10-CM

## 2022-12-15 DIAGNOSIS — M53.3 DISORDER OF SACRUM: Chronic | ICD-10-CM

## 2022-12-15 DIAGNOSIS — M54.17 LUMBOSACRAL RADICULOPATHY: Primary | Chronic | ICD-10-CM

## 2022-12-15 PROCEDURE — 3077F SYST BP >= 140 MM HG: CPT | Mod: CPTII,,, | Performed by: PHYSICIAN ASSISTANT

## 2022-12-15 PROCEDURE — 1125F AMNT PAIN NOTED PAIN PRSNT: CPT | Mod: CPTII,,, | Performed by: PHYSICIAN ASSISTANT

## 2022-12-15 PROCEDURE — 3008F PR BODY MASS INDEX (BMI) DOCUMENTED: ICD-10-PCS | Mod: CPTII,,, | Performed by: PHYSICIAN ASSISTANT

## 2022-12-15 PROCEDURE — 99214 OFFICE O/P EST MOD 30 MIN: CPT | Mod: S$PBB,,, | Performed by: PHYSICIAN ASSISTANT

## 2022-12-15 PROCEDURE — 99214 PR OFFICE/OUTPT VISIT, EST, LEVL IV, 30-39 MIN: ICD-10-PCS | Mod: S$PBB,,, | Performed by: PHYSICIAN ASSISTANT

## 2022-12-15 PROCEDURE — 3288F FALL RISK ASSESSMENT DOCD: CPT | Mod: CPTII,,, | Performed by: PHYSICIAN ASSISTANT

## 2022-12-15 PROCEDURE — 1101F PR PT FALLS ASSESS DOC 0-1 FALLS W/OUT INJ PAST YR: ICD-10-PCS | Mod: CPTII,,, | Performed by: PHYSICIAN ASSISTANT

## 2022-12-15 PROCEDURE — 1159F MED LIST DOCD IN RCRD: CPT | Mod: CPTII,,, | Performed by: PHYSICIAN ASSISTANT

## 2022-12-15 PROCEDURE — 1159F PR MEDICATION LIST DOCUMENTED IN MEDICAL RECORD: ICD-10-PCS | Mod: CPTII,,, | Performed by: PHYSICIAN ASSISTANT

## 2022-12-15 PROCEDURE — 3288F PR FALLS RISK ASSESSMENT DOCUMENTED: ICD-10-PCS | Mod: CPTII,,, | Performed by: PHYSICIAN ASSISTANT

## 2022-12-15 PROCEDURE — 1101F PT FALLS ASSESS-DOCD LE1/YR: CPT | Mod: CPTII,,, | Performed by: PHYSICIAN ASSISTANT

## 2022-12-15 PROCEDURE — 3008F BODY MASS INDEX DOCD: CPT | Mod: CPTII,,, | Performed by: PHYSICIAN ASSISTANT

## 2022-12-15 PROCEDURE — 99214 OFFICE O/P EST MOD 30 MIN: CPT | Mod: PBBFAC | Performed by: PHYSICIAN ASSISTANT

## 2022-12-15 PROCEDURE — 1125F PR PAIN SEVERITY QUANTIFIED, PAIN PRESENT: ICD-10-PCS | Mod: CPTII,,, | Performed by: PHYSICIAN ASSISTANT

## 2022-12-15 PROCEDURE — 3077F PR MOST RECENT SYSTOLIC BLOOD PRESSURE >= 140 MM HG: ICD-10-PCS | Mod: CPTII,,, | Performed by: PHYSICIAN ASSISTANT

## 2022-12-15 PROCEDURE — 3078F PR MOST RECENT DIASTOLIC BLOOD PRESSURE < 80 MM HG: ICD-10-PCS | Mod: CPTII,,, | Performed by: PHYSICIAN ASSISTANT

## 2022-12-15 PROCEDURE — 80305 DRUG TEST PRSMV DIR OPT OBS: CPT | Mod: PBBFAC | Performed by: PHYSICIAN ASSISTANT

## 2022-12-15 PROCEDURE — 3078F DIAST BP <80 MM HG: CPT | Mod: CPTII,,, | Performed by: PHYSICIAN ASSISTANT

## 2022-12-21 ENCOUNTER — HOSPITAL ENCOUNTER (EMERGENCY)
Facility: HOSPITAL | Age: 67
Discharge: HOME | End: 2022-12-21
Payer: COMMERCIAL

## 2022-12-21 VITALS
HEART RATE: 89 BPM | WEIGHT: 175 LBS | OXYGEN SATURATION: 97 % | DIASTOLIC BLOOD PRESSURE: 73 MMHG | HEIGHT: 62 IN | TEMPERATURE: 98 F | RESPIRATION RATE: 18 BRPM | BODY MASS INDEX: 32.2 KG/M2 | SYSTOLIC BLOOD PRESSURE: 101 MMHG

## 2022-12-21 DIAGNOSIS — K59.00 CONSTIPATION, UNSPECIFIED CONSTIPATION TYPE: Primary | ICD-10-CM

## 2022-12-21 DIAGNOSIS — K59.00 CONSTIPATION: ICD-10-CM

## 2022-12-21 PROCEDURE — 99282 EMERGENCY DEPT VISIT SF MDM: CPT | Performed by: FAMILY MEDICINE

## 2022-12-21 PROCEDURE — 99283 EMERGENCY DEPT VISIT LOW MDM: CPT

## 2022-12-21 NOTE — ED PROVIDER NOTES
Encounter Date: 12/21/2022       History     Chief Complaint   Patient presents with    Constipation     Patient took her Linzess either bottle and put it in her daily container.  Which likely neutralizing medication.  She is been suffering having constipation over the last 2 weeks.  Patient was advised to take 2 doses of MiraLax to see if it helps her.  An x-ray was done to look at her overall bowel pattern to see if there is an obstruction.      Review of patient's allergies indicates:  No Known Allergies  Past Medical History:   Diagnosis Date    Chronic low back pain     Depression     Dysfunctional voiding of urine 11/10/2021    Patient states she does not urinate.  She was taken off of lasix which was the only thing that helped her empty. PVR 0 ml Chronic constipation, lifelong.  Has been out of Linzess for a month now. 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  Negative for fever.   HENT: Negative.  Negative for sore throat.    Eyes: Negative.    Respiratory: Negative.  Negative for shortness of breath.    Cardiovascular: Negative.  Negative for chest pain.   Gastrointestinal: Negative.  Negative for nausea.   Endocrine: Negative.    Genitourinary: Negative.  Negative for  dysuria.   Musculoskeletal: Negative.  Negative for back pain.   Skin: Negative.  Negative for rash.   Allergic/Immunologic: Negative.    Neurological: Negative.  Negative for weakness.   Hematological: Negative.  Does not bruise/bleed easily.   Psychiatric/Behavioral: Negative.     All other systems reviewed and are negative.    Physical Exam     Initial Vitals [12/21/22 1558]   BP Pulse Resp Temp SpO2   101/73 89 18 98.1 °F (36.7 °C) 97 %      MAP       --         Physical Exam    Constitutional: She appears well-developed and well-nourished.   HENT:   Head: Normocephalic and atraumatic.   Right Ear: External ear normal.   Left Ear: External ear normal.   Nose: Nose normal.   Mouth/Throat: Oropharynx is clear and moist.   Eyes: Conjunctivae and EOM are normal. Pupils are equal, round, and reactive to light.   Neck: Neck supple.   Normal range of motion.  Cardiovascular:  Normal rate, regular rhythm, normal heart sounds and intact distal pulses.           Pulmonary/Chest: Breath sounds normal.   Abdominal: Abdomen is soft. Bowel sounds are normal.   Genitourinary:    Vagina and uterus normal.     Musculoskeletal:         General: Normal range of motion.      Cervical back: Normal range of motion and neck supple.     Neurological: She is alert and oriented to person, place, and time. She has normal strength and normal reflexes.   Skin: Skin is warm. Capillary refill takes less than 2 seconds.   Psychiatric: She has a normal mood and affect. Her behavior is normal. Judgment and thought content normal.       Medical Screening Exam   See Full Note    ED Course   Procedures  Labs Reviewed - No data to display       Imaging Results              X-Ray Abdomen AP 1 View (KUB) (Final result)  Result time 12/21/22 16:32:35      Final result by Tony Nina II, MD (12/21/22 16:32:35)                   Impression:      No evidence of abnormality demonstrated      Electronically signed by: Tony  Maico  Date:    12/21/2022  Time:    16:32               Narrative:    EXAMINATION:  XR ABDOMEN AP 1 VIEW    CLINICAL HISTORY:  Abdominal pain    COMPARISON:  19 November 2021    FINDINGS:  No free fluid or free air seen.  The bowel gas pattern appears within normal limits.  No abnormal calcifications are present.  No other abnormality is identified.                                       Medications - No data to display                    Clinical Impression:   Final diagnoses:  [K59.00] Constipation, unspecified constipation type (Primary)        ED Disposition Condition    Discharge Stable          ED Prescriptions    None       Follow-up Information    None          Sean Herron DO  12/21/22 1640

## 2022-12-21 NOTE — ED NOTES
Instructed pt on usage of fleets enema with vu. Also instructed pt to increase water intake with vu.

## 2022-12-21 NOTE — ED NOTES
Pt has Linzess out of original bottle and in medication organizer. Instructed pt importance of leaving Linzess in original bottle r/t pill not working outside of original bottle. Pt kwasi.

## 2023-02-15 ENCOUNTER — TELEPHONE (OUTPATIENT)
Dept: SURGERY | Facility: CLINIC | Age: 68
End: 2023-02-15
Payer: COMMERCIAL

## 2023-02-15 NOTE — TELEPHONE ENCOUNTER
Called patient to schedule routine colonoscopy screening with Dr. Soriano at [facility] in [place], [place]. Patient's daughter answered the phone and stated she was not around her mother at this time but would get with her and give me a call back.

## 2023-03-09 DIAGNOSIS — Z71.89 COMPLEX CARE COORDINATION: ICD-10-CM

## 2023-03-29 ENCOUNTER — OFFICE VISIT (OUTPATIENT)
Dept: PAIN MEDICINE | Facility: CLINIC | Age: 68
End: 2023-03-29
Payer: COMMERCIAL

## 2023-03-29 VITALS
WEIGHT: 172.63 LBS | BODY MASS INDEX: 31.77 KG/M2 | SYSTOLIC BLOOD PRESSURE: 132 MMHG | HEART RATE: 59 BPM | HEIGHT: 62 IN | DIASTOLIC BLOOD PRESSURE: 64 MMHG

## 2023-03-29 DIAGNOSIS — M54.50 CHRONIC BILATERAL LOW BACK PAIN WITHOUT SCIATICA: ICD-10-CM

## 2023-03-29 DIAGNOSIS — G89.29 CHRONIC BILATERAL LOW BACK PAIN WITHOUT SCIATICA: ICD-10-CM

## 2023-03-29 DIAGNOSIS — Z79.899 ENCOUNTER FOR LONG-TERM (CURRENT) USE OF OTHER MEDICATIONS: Primary | ICD-10-CM

## 2023-03-29 DIAGNOSIS — K59.03 THERAPEUTIC OPIOID-INDUCED CONSTIPATION (OIC): Chronic | ICD-10-CM

## 2023-03-29 DIAGNOSIS — T40.2X5A THERAPEUTIC OPIOID-INDUCED CONSTIPATION (OIC): Chronic | ICD-10-CM

## 2023-03-29 DIAGNOSIS — M47.817 SPONDYLOSIS OF LUMBOSACRAL REGION WITHOUT MYELOPATHY OR RADICULOPATHY: ICD-10-CM

## 2023-03-29 LAB
CTP QC/QA: YES
POC (AMP) AMPHETAMINE: NEGATIVE
POC (BAR) BARBITURATES: NEGATIVE
POC (BUP) BUPRENORPHINE: NEGATIVE
POC (BZO) BENZODIAZEPINES: NEGATIVE
POC (COC) COCAINE: NEGATIVE
POC (MDMA) METHYLENEDIOXYMETHAMPHETAMINE 3,4: NEGATIVE
POC (MET) METHAMPHETAMINE: NEGATIVE
POC (MOP) OPIATES: ABNORMAL
POC (MTD) METHADONE: NEGATIVE
POC (OXY) OXYCODONE: NEGATIVE
POC (PCP) PHENCYCLIDINE: NEGATIVE
POC (TCA) TRICYCLIC ANTIDEPRESSANTS: NEGATIVE
POC TEMPERATURE (URINE): 90
POC THC: NEGATIVE

## 2023-03-29 PROCEDURE — 3288F FALL RISK ASSESSMENT DOCD: CPT | Mod: CPTII,,, | Performed by: PAIN MEDICINE

## 2023-03-29 PROCEDURE — 3075F PR MOST RECENT SYSTOLIC BLOOD PRESS GE 130-139MM HG: ICD-10-PCS | Mod: CPTII,,, | Performed by: PAIN MEDICINE

## 2023-03-29 PROCEDURE — 1159F PR MEDICATION LIST DOCUMENTED IN MEDICAL RECORD: ICD-10-PCS | Mod: CPTII,,, | Performed by: PAIN MEDICINE

## 2023-03-29 PROCEDURE — 3078F DIAST BP <80 MM HG: CPT | Mod: CPTII,,, | Performed by: PAIN MEDICINE

## 2023-03-29 PROCEDURE — 99213 OFFICE O/P EST LOW 20 MIN: CPT | Mod: S$PBB,,, | Performed by: PAIN MEDICINE

## 2023-03-29 PROCEDURE — 1125F PR PAIN SEVERITY QUANTIFIED, PAIN PRESENT: ICD-10-PCS | Mod: CPTII,,, | Performed by: PAIN MEDICINE

## 2023-03-29 PROCEDURE — 1101F PT FALLS ASSESS-DOCD LE1/YR: CPT | Mod: CPTII,,, | Performed by: PAIN MEDICINE

## 2023-03-29 PROCEDURE — 3288F PR FALLS RISK ASSESSMENT DOCUMENTED: ICD-10-PCS | Mod: CPTII,,, | Performed by: PAIN MEDICINE

## 2023-03-29 PROCEDURE — 99213 PR OFFICE/OUTPT VISIT, EST, LEVL III, 20-29 MIN: ICD-10-PCS | Mod: S$PBB,,, | Performed by: PAIN MEDICINE

## 2023-03-29 PROCEDURE — 3008F PR BODY MASS INDEX (BMI) DOCUMENTED: ICD-10-PCS | Mod: CPTII,,, | Performed by: PAIN MEDICINE

## 2023-03-29 PROCEDURE — 1159F MED LIST DOCD IN RCRD: CPT | Mod: CPTII,,, | Performed by: PAIN MEDICINE

## 2023-03-29 PROCEDURE — 1101F PR PT FALLS ASSESS DOC 0-1 FALLS W/OUT INJ PAST YR: ICD-10-PCS | Mod: CPTII,,, | Performed by: PAIN MEDICINE

## 2023-03-29 PROCEDURE — 3075F SYST BP GE 130 - 139MM HG: CPT | Mod: CPTII,,, | Performed by: PAIN MEDICINE

## 2023-03-29 PROCEDURE — 80305 DRUG TEST PRSMV DIR OPT OBS: CPT | Mod: PBBFAC | Performed by: PAIN MEDICINE

## 2023-03-29 PROCEDURE — 1125F AMNT PAIN NOTED PAIN PRSNT: CPT | Mod: CPTII,,, | Performed by: PAIN MEDICINE

## 2023-03-29 PROCEDURE — 3008F BODY MASS INDEX DOCD: CPT | Mod: CPTII,,, | Performed by: PAIN MEDICINE

## 2023-03-29 PROCEDURE — 99215 OFFICE O/P EST HI 40 MIN: CPT | Mod: PBBFAC | Performed by: PAIN MEDICINE

## 2023-03-29 PROCEDURE — 3078F PR MOST RECENT DIASTOLIC BLOOD PRESSURE < 80 MM HG: ICD-10-PCS | Mod: CPTII,,, | Performed by: PAIN MEDICINE

## 2023-03-29 RX ORDER — HYDROCODONE BITARTRATE AND ACETAMINOPHEN 7.5; 325 MG/1; MG/1
1 TABLET ORAL
Qty: 30 TABLET | Refills: 0 | Status: SHIPPED | OUTPATIENT
Start: 2023-03-29 | End: 2023-04-28

## 2023-03-29 RX ORDER — HYDROCODONE BITARTRATE AND ACETAMINOPHEN 7.5; 325 MG/1; MG/1
1 TABLET ORAL
Qty: 30 TABLET | Refills: 0 | Status: SHIPPED | OUTPATIENT
Start: 2023-04-28 | End: 2023-05-28

## 2023-03-29 RX ORDER — NALOXEGOL OXALATE 25 MG/1
25 TABLET, FILM COATED ORAL DAILY
Qty: 30 TABLET | Refills: 1 | Status: SHIPPED | OUTPATIENT
Start: 2023-03-29 | End: 2023-05-15 | Stop reason: SDUPTHER

## 2023-03-29 NOTE — PATIENT INSTRUCTIONS
BILATERAL L4-S1 MEDIAL BRANCH BLOCK  4/13/23 AT 10:50 AM    Procedure Instructions:    Nothing to eat or drink for 8 hours or after midnight including gum, candy, mints, or tobacco products.  If you are scheduled for 1:30 or later nothing to eat or drink after 5 a.m. the morning of the procedure, including gum, candy, mints, or tobacco products.  Must have a  at least 18 yrs of age to stay present at all times  No Diabetic medications the morning of procedure, check blood sugar the morning of procedure, if it is greater than 200 call the office at [phone]  If you are started on antibiotics or have been prescribed antibiotics, have a fever, or have any other type of infection call to reschedule procedure.  If you take blood pressure medications you can take it at your regular scheduled time with a small sip of WATER!    HOLD ASPIRIN AND ASPIRIN PRODUCTS  (ASPIRIN, BC POWDER ETC. ) FOR 7 DAYS  PRIOR TO PROCEDURE  HOLD NSAIDS( ibuprofen, mobic, meloxicam, advil, diclofenac, naproxen, relafen, celebrex,  methotrexate, aleve etc....)  FOR 3 DAYS   PRIOR TO PROCEDURE

## 2023-03-29 NOTE — PROGRESS NOTES
She Disclaimer: This note has been generated using voice-recognition software. There may be typographical errors that have been missed during proof-reading        Patient ID: Xavier Rodriguez is a 67 y.o. female.      Chief Complaint: Mid-back Pain and Low-back Pain      67-year-old female presents for re-evaluation of chronic lower back pain.  She received physical therapy in the [place], [place] with minimal relief.  She has been treated with Norco for several years but with side effects of constipation.  X-rays reveal degenerative changes and facet arthropathy.  She never received nerve block injections or a surgical consultation.  She returns today for medication refill.  She describes the pain as axial without leg involvement.  The pain is sharp, aching and throbbing.  She denies paresthesia or weakness of the lower extremities.  Lumbar medial branch blocks were discussed for lower back pain and spondylosis.        Pain Assessment  Pain Score: 10-Worst pain ever  Pain Location: Back (middle/lower back pain)  Pain Descriptors: Aching, Sharp  Pain Frequency: Constant/continuous  Pain Onset: Awakened from sleep  Clinical Progression: Not changed  Aggravating Factors: Standing, Walking  Pain Intervention(s): Medication (See eMAR), Home medication      A's of Opioid Risk Assessment  Activity:Patient has difficulty performing ADL.   Analgesia:Patients pain is partially controlled by current medication.   Adverse Effects: Patient denies constipation or sedation.  Aberrant Behavior:  reviewed with no aberrant drug seeking/taking behavior.      Patient denies any suicidal or homicidal ideations    Physical Therapy/Home Exercise: yes      X-Ray Abdomen AP 1 View (KUB)  Narrative: EXAMINATION:  XR ABDOMEN AP 1 VIEW    CLINICAL HISTORY:  Abdominal pain    COMPARISON:  19 November 2021    FINDINGS:  No free fluid or free air seen.  The bowel gas pattern appears within normal limits.  No abnormal calcifications are  present.  No other abnormality is identified.  Impression: No evidence of abnormality demonstrated    Electronically signed by: Tony Nina  Date:    12/21/2022  Time:    16:32      Review of Systems          Past Medical History:   Diagnosis Date    Chronic low back pain     Depression     Dysfunctional voiding of urine 11/10/2021    Patient states she does not urinate.  She was taken off of lasix which was the only thing that helped her empty. PVR 0 ml Chronic constipation, lifelong.  Has been out of Linzess for a month now. 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    TUMOR REMOVAL       Social History     Socioeconomic History    Marital status: Single   Tobacco Use    Smoking status: Former     Packs/day: 0.00     Years: 0.00     Pack years: 0.00     Types: Cigarettes     Passive exposure: Past    Smokeless tobacco: Never    Tobacco comments:     Stop smoking about 15 years ago   Substance and Sexual Activity    Alcohol use: Never    Drug use: Never    Sexual activity: Not Currently     Partners: Male     Birth control/protection: None     Family History   Problem Relation Age of Onset    Heart disease Mother     Heart attack Mother     Heart disease Father         Heart attack    Heart attack Father      Review of patient's allergies indicates:  No Known Allergies  has a current medication list which includes the following prescription(s): amitriptyline, furosemide, hydrocodone-acetaminophen, hydrocodone-acetaminophen, hydrocodone-acetaminophen, linzess, lovastatin, naproxen, tamsulosin, topiramate, hydrocodone-acetaminophen, [START ON 4/28/2023] hydrocodone-acetaminophen, and movantik.      Objective:  Vitals:    03/29/23 1012   BP: 132/64   Pulse: (!) 59        Physical Exam      Assessment:      1. Encounter  for long-term (current) use of other medications    2. Spondylosis of lumbosacral region without myelopathy or radiculopathy    3. Chronic bilateral low back pain without sciatica    4. Therapeutic opioid-induced constipation (OIC)            Plan:  1. reviewed  2.Addiction, Dependency, Tolerance, Opioid abuse-misuse, Death, Diversion Discussed. Overdose reversal drug Naloxone discussed.  3.Refill/Continue medications for pain control and function       Requested Prescriptions     Signed Prescriptions Disp Refills    HYDROcodone-acetaminophen (NORCO) 7.5-325 mg per tablet 30 tablet 0     Sig: Take 1 tablet by mouth every 24 hours as needed for Pain.    HYDROcodone-acetaminophen (NORCO) 7.5-325 mg per tablet 30 tablet 0     Sig: Take 1 tablet by mouth every 24 hours as needed for Pain.    naloxegoL (MOVANTIK) 25 mg tablet 30 tablet 1     Sig: Take 25 mg by mouth once daily.     4.Urine drug screen point of care obtained and consistent with prescribed medications and medication refill date  5.Xvaier Rodriguez has chronic, moderate to severe axial lower back pain present for over the past 3 months that has failed to respond to physical therapy, NSAIDs, and muscle relaxants. There is no untreated radiculopathy or claudication present.  The patient has clinical and radiologic findings suggestive of facet mediated pain.  We will schedule for 1st diagnostic bilateral lumbar L4/5 and L5/S1 medial branch blocks.    Orders Placed This Encounter   Procedures    POCT Urine Drug Screen Presump     Interpretive Information:     Negative:  No drug detected at the cut off level.   Positive:  This result represents presumptive positive for the   tested drug, other substances may yield a positive response other   than the analyte of interest. This result should be utilized for   diagnostic purpose only. Confirmation testing will be performed upon physician request only.       Case Request Operating Room: Bilateral L4-5,5-S1 MBB      Order Specific Question:   Medical Necessity:     Answer:   Medically Non-Urgent [100]     Order Specific Question:   CPT Code:     Answer:   LA INJ DX/THER AGNT PARAVERT FACET JOINT,IMG GUIDE,LUMBAR/SAC,1ST LVL [71342]     Order Specific Question:   CPT Code:     Answer:   LA INJ DX/THER AGNT PARAVERT FACET JOINT,IMG GUIDE,LUMBAR/SAC, 2ND LEVEL [11276]     Order Specific Question:   Post-Procedure Disposition:     Answer:   PACU [1]     Order Specific Question:   Estimated Length of Stay:     Answer:   0 midnight     Order Specific Question:   Implant Required:     Answer:   No [1001]     Order Specific Question:   Is an on-site pathologist required for this procedure?     Answer:   N/A      6. Monitored Anesthesia Care medical necessity authorization request:    Monitor anesthesia request is medically indicated for the scheduled nerve block procedure due to:  - needle phobia and anxiety, placing  the patient at risk during the provided service.  -patient has a BMI greater than 45  -patient has severe problems with muscles and muscle spasticity that makes it hard to lie still  -patient suffers from chronic pain and is unable to function due to  diminished ADLs    7.Indications for this procedure for this specific patient include the following   - Pt has had symptoms for three months with moderate to severe pain with functional impairment rated of 7/10 pain.   - Pain non-responsive to conservative care.    - Pain predominately axial and not associated with radiculopathy or claudication.    - No non-facet pathology as source of pain.    - Clinical assessment implicates facet joint as putative pain source.    - Pain is exacerbated by extension or prolonged sitting/standing and relieved by rest.    - No unexplained neurologic deficit.    - No history of coagulopathy, infection or unstable medical conditions.    - Pain is causing significant functional limitation resulting in diminished quality of life and impaired age  appropriate ADL's.   - Clinical assessment implicates facet joint as putative source of pain  - Repeat injections not done prior to 7 days   - no more than 2 levels will be done per side      8.The planned medically necessary  surgical procedure is performed in a hospital outpatient department and not in an ambulatory surgical center due to:     -there is no geographically assessable ambulatory surgery center that has the  necessary equipment and fluoroscopy needed for the procedure     -there is no geographically assessable ambulatory surgical center available at which the physician has privileges     -an ASC's  specific  guideline regarding the individuals weight or health conditions that prevent the use of an ASC         report:  Reviewed and consistent with medication use as prescribed.      The total time spent for evaluation and management on 03/30/2023 including reviewing separately obtained history, performing a medically appropriate exam and evaluation, documenting clinical information in the health record, independently interpreting results and communicating them to the patient/family/caregiver, and ordering medications/tests/procedures was between 15-29 minutes.    The above plan and management options were discussed at length with patient. Patient is in agreement with the above and verbalized understanding. It will be communicated with the referring physician via electronic record, fax, or mail.

## 2023-03-29 NOTE — H&P (VIEW-ONLY)
She Disclaimer: This note has been generated using voice-recognition software. There may be typographical errors that have been missed during proof-reading        Patient ID: Xavier Rodriguez is a 67 y.o. female.      Chief Complaint: Mid-back Pain and Low-back Pain      67-year-old female presents for re-evaluation of chronic lower back pain.  She received physical therapy in the [place], [place] with minimal relief.  She has been treated with Norco for several years but with side effects of constipation.  X-rays reveal degenerative changes and facet arthropathy.  She never received nerve block injections or a surgical consultation.  She returns today for medication refill.  She describes the pain as axial without leg involvement.  The pain is sharp, aching and throbbing.  She denies paresthesia or weakness of the lower extremities.  Lumbar medial branch blocks were discussed for lower back pain and spondylosis.        Pain Assessment  Pain Score: 10-Worst pain ever  Pain Location: Back (middle/lower back pain)  Pain Descriptors: Aching, Sharp  Pain Frequency: Constant/continuous  Pain Onset: Awakened from sleep  Clinical Progression: Not changed  Aggravating Factors: Standing, Walking  Pain Intervention(s): Medication (See eMAR), Home medication      A's of Opioid Risk Assessment  Activity:Patient has difficulty performing ADL.   Analgesia:Patients pain is partially controlled by current medication.   Adverse Effects: Patient denies constipation or sedation.  Aberrant Behavior:  reviewed with no aberrant drug seeking/taking behavior.      Patient denies any suicidal or homicidal ideations    Physical Therapy/Home Exercise: yes      X-Ray Abdomen AP 1 View (KUB)  Narrative: EXAMINATION:  XR ABDOMEN AP 1 VIEW    CLINICAL HISTORY:  Abdominal pain    COMPARISON:  19 November 2021    FINDINGS:  No free fluid or free air seen.  The bowel gas pattern appears within normal limits.  No abnormal calcifications are  present.  No other abnormality is identified.  Impression: No evidence of abnormality demonstrated    Electronically signed by: Tony Nina  Date:    12/21/2022  Time:    16:32      Review of Systems          Past Medical History:   Diagnosis Date    Chronic low back pain     Depression     Dysfunctional voiding of urine 11/10/2021    Patient states she does not urinate.  She was taken off of lasix which was the only thing that helped her empty. PVR 0 ml Chronic constipation, lifelong.  Has been out of Linzess for a month now. 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    TUMOR REMOVAL       Social History     Socioeconomic History    Marital status: Single   Tobacco Use    Smoking status: Former     Packs/day: 0.00     Years: 0.00     Pack years: 0.00     Types: Cigarettes     Passive exposure: Past    Smokeless tobacco: Never    Tobacco comments:     Stop smoking about 15 years ago   Substance and Sexual Activity    Alcohol use: Never    Drug use: Never    Sexual activity: Not Currently     Partners: Male     Birth control/protection: None     Family History   Problem Relation Age of Onset    Heart disease Mother     Heart attack Mother     Heart disease Father         Heart attack    Heart attack Father      Review of patient's allergies indicates:  No Known Allergies  has a current medication list which includes the following prescription(s): amitriptyline, furosemide, hydrocodone-acetaminophen, hydrocodone-acetaminophen, hydrocodone-acetaminophen, linzess, lovastatin, naproxen, tamsulosin, topiramate, hydrocodone-acetaminophen, [START ON 4/28/2023] hydrocodone-acetaminophen, and movantik.      Objective:  Vitals:    03/29/23 1012   BP: 132/64   Pulse: (!) 59        Physical Exam      Assessment:      1. Encounter  for long-term (current) use of other medications    2. Spondylosis of lumbosacral region without myelopathy or radiculopathy    3. Chronic bilateral low back pain without sciatica    4. Therapeutic opioid-induced constipation (OIC)            Plan:  1. reviewed  2.Addiction, Dependency, Tolerance, Opioid abuse-misuse, Death, Diversion Discussed. Overdose reversal drug Naloxone discussed.  3.Refill/Continue medications for pain control and function       Requested Prescriptions     Signed Prescriptions Disp Refills    HYDROcodone-acetaminophen (NORCO) 7.5-325 mg per tablet 30 tablet 0     Sig: Take 1 tablet by mouth every 24 hours as needed for Pain.    HYDROcodone-acetaminophen (NORCO) 7.5-325 mg per tablet 30 tablet 0     Sig: Take 1 tablet by mouth every 24 hours as needed for Pain.    naloxegoL (MOVANTIK) 25 mg tablet 30 tablet 1     Sig: Take 25 mg by mouth once daily.     4.Urine drug screen point of care obtained and consistent with prescribed medications and medication refill date  5.Xavier Rodriguez has chronic, moderate to severe axial lower back pain present for over the past 3 months that has failed to respond to physical therapy, NSAIDs, and muscle relaxants. There is no untreated radiculopathy or claudication present.  The patient has clinical and radiologic findings suggestive of facet mediated pain.  We will schedule for 1st diagnostic bilateral lumbar L4/5 and L5/S1 medial branch blocks.    Orders Placed This Encounter   Procedures    POCT Urine Drug Screen Presump     Interpretive Information:     Negative:  No drug detected at the cut off level.   Positive:  This result represents presumptive positive for the   tested drug, other substances may yield a positive response other   than the analyte of interest. This result should be utilized for   diagnostic purpose only. Confirmation testing will be performed upon physician request only.       Case Request Operating Room: Bilateral L4-5,5-S1 MBB      Order Specific Question:   Medical Necessity:     Answer:   Medically Non-Urgent [100]     Order Specific Question:   CPT Code:     Answer:   OR INJ DX/THER AGNT PARAVERT FACET JOINT,IMG GUIDE,LUMBAR/SAC,1ST LVL [78992]     Order Specific Question:   CPT Code:     Answer:   OR INJ DX/THER AGNT PARAVERT FACET JOINT,IMG GUIDE,LUMBAR/SAC, 2ND LEVEL [81792]     Order Specific Question:   Post-Procedure Disposition:     Answer:   PACU [1]     Order Specific Question:   Estimated Length of Stay:     Answer:   0 midnight     Order Specific Question:   Implant Required:     Answer:   No [1001]     Order Specific Question:   Is an on-site pathologist required for this procedure?     Answer:   N/A      6. Monitored Anesthesia Care medical necessity authorization request:    Monitor anesthesia request is medically indicated for the scheduled nerve block procedure due to:  - needle phobia and anxiety, placing  the patient at risk during the provided service.  -patient has a BMI greater than 45  -patient has severe problems with muscles and muscle spasticity that makes it hard to lie still  -patient suffers from chronic pain and is unable to function due to  diminished ADLs    7.Indications for this procedure for this specific patient include the following   - Pt has had symptoms for three months with moderate to severe pain with functional impairment rated of 7/10 pain.   - Pain non-responsive to conservative care.    - Pain predominately axial and not associated with radiculopathy or claudication.    - No non-facet pathology as source of pain.    - Clinical assessment implicates facet joint as putative pain source.    - Pain is exacerbated by extension or prolonged sitting/standing and relieved by rest.    - No unexplained neurologic deficit.    - No history of coagulopathy, infection or unstable medical conditions.    - Pain is causing significant functional limitation resulting in diminished quality of life and impaired age  appropriate ADL's.   - Clinical assessment implicates facet joint as putative source of pain  - Repeat injections not done prior to 7 days   - no more than 2 levels will be done per side      8.The planned medically necessary  surgical procedure is performed in a hospital outpatient department and not in an ambulatory surgical center due to:     -there is no geographically assessable ambulatory surgery center that has the  necessary equipment and fluoroscopy needed for the procedure     -there is no geographically assessable ambulatory surgical center available at which the physician has privileges     -an ASC's  specific  guideline regarding the individuals weight or health conditions that prevent the use of an ASC         report:  Reviewed and consistent with medication use as prescribed.      The total time spent for evaluation and management on 03/30/2023 including reviewing separately obtained history, performing a medically appropriate exam and evaluation, documenting clinical information in the health record, independently interpreting results and communicating them to the patient/family/caregiver, and ordering medications/tests/procedures was between 15-29 minutes.    The above plan and management options were discussed at length with patient. Patient is in agreement with the above and verbalized understanding. It will be communicated with the referring physician via electronic record, fax, or mail.

## 2023-04-10 ENCOUNTER — OFFICE VISIT (OUTPATIENT)
Dept: FAMILY MEDICINE | Facility: CLINIC | Age: 68
End: 2023-04-10
Payer: COMMERCIAL

## 2023-04-10 VITALS
TEMPERATURE: 98 F | SYSTOLIC BLOOD PRESSURE: 138 MMHG | DIASTOLIC BLOOD PRESSURE: 88 MMHG | HEIGHT: 62 IN | BODY MASS INDEX: 31.71 KG/M2 | OXYGEN SATURATION: 100 % | WEIGHT: 172.31 LBS | RESPIRATION RATE: 20 BRPM | HEART RATE: 60 BPM

## 2023-04-10 DIAGNOSIS — R33.9 URINARY RETENTION: Primary | ICD-10-CM

## 2023-04-10 DIAGNOSIS — K59.00 CONSTIPATION, UNSPECIFIED CONSTIPATION TYPE: ICD-10-CM

## 2023-04-10 DIAGNOSIS — E87.6 HYPOKALEMIA: ICD-10-CM

## 2023-04-10 LAB
ANION GAP SERPL CALCULATED.3IONS-SCNC: 8 MMOL/L (ref 7–16)
BACTERIA #/AREA URNS HPF: ABNORMAL /HPF
BILIRUB SERPL-MCNC: NORMAL MG/DL
BILIRUB UR QL STRIP: NEGATIVE
BLOOD URINE, POC: NORMAL
BUN SERPL-MCNC: 9 MG/DL (ref 7–18)
BUN/CREAT SERPL: 12 (ref 6–20)
CALCIUM SERPL-MCNC: 8.4 MG/DL (ref 8.5–10.1)
CHLORIDE SERPL-SCNC: 110 MMOL/L (ref 98–107)
CLARITY UR: CLEAR
CO2 SERPL-SCNC: 31 MMOL/L (ref 21–32)
COLOR UR: YELLOW
COLOR, POC UA: NORMAL
CREAT SERPL-MCNC: 0.73 MG/DL (ref 0.55–1.02)
EGFR (NO RACE VARIABLE) (RUSH/TITUS): 90 ML/MIN/1.73M²
GLUCOSE SERPL-MCNC: 94 MG/DL (ref 74–106)
GLUCOSE UR QL STRIP: NORMAL
GLUCOSE UR STRIP-MCNC: NORMAL MG/DL
KETONES UR QL STRIP: NORMAL
KETONES UR STRIP-SCNC: NEGATIVE MG/DL
LEUKOCYTE ESTERASE UR QL STRIP: ABNORMAL
LEUKOCYTE ESTERASE URINE, POC: NORMAL
MUCOUS, UA: ABNORMAL /LPF
NITRITE UR QL STRIP: NEGATIVE
NITRITE, POC UA: NORMAL
NT-PROBNP SERPL-MCNC: 330 PG/ML (ref 1–125)
PH UR STRIP: 6.5 PH UNITS
PH, POC UA: 6.5
POTASSIUM SERPL-SCNC: 3.2 MMOL/L (ref 3.5–5.1)
PROT UR QL STRIP: 30
PROTEIN, POC: 30
RBC # UR STRIP: NEGATIVE /UL
RBC #/AREA URNS HPF: 4 /HPF
SODIUM SERPL-SCNC: 146 MMOL/L (ref 136–145)
SP GR UR STRIP: 1.03
SPECIFIC GRAVITY, POC UA: 1.02
SQUAMOUS #/AREA URNS LPF: ABNORMAL /HPF
UROBILINOGEN UR STRIP-ACNC: 2 MG/DL
UROBILINOGEN, POC UA: 1
WBC #/AREA URNS HPF: 7 /HPF

## 2023-04-10 PROCEDURE — 3075F SYST BP GE 130 - 139MM HG: CPT | Mod: ,,, | Performed by: NURSE PRACTITIONER

## 2023-04-10 PROCEDURE — 83880 NT-PRO NATRIURETIC PEPTIDE: ICD-10-PCS | Mod: ,,, | Performed by: CLINICAL MEDICAL LABORATORY

## 2023-04-10 PROCEDURE — 3288F PR FALLS RISK ASSESSMENT DOCUMENTED: ICD-10-PCS | Mod: ,,, | Performed by: NURSE PRACTITIONER

## 2023-04-10 PROCEDURE — 80048 BASIC METABOLIC PNL TOTAL CA: CPT | Mod: ,,, | Performed by: CLINICAL MEDICAL LABORATORY

## 2023-04-10 PROCEDURE — 81001 URINALYSIS AUTO W/SCOPE: CPT | Mod: ,,, | Performed by: CLINICAL MEDICAL LABORATORY

## 2023-04-10 PROCEDURE — 1101F PT FALLS ASSESS-DOCD LE1/YR: CPT | Mod: ,,, | Performed by: NURSE PRACTITIONER

## 2023-04-10 PROCEDURE — 80048 BASIC METABOLIC PANEL: ICD-10-PCS | Mod: ,,, | Performed by: CLINICAL MEDICAL LABORATORY

## 2023-04-10 PROCEDURE — 1101F PR PT FALLS ASSESS DOC 0-1 FALLS W/OUT INJ PAST YR: ICD-10-PCS | Mod: ,,, | Performed by: NURSE PRACTITIONER

## 2023-04-10 PROCEDURE — 3008F BODY MASS INDEX DOCD: CPT | Mod: ,,, | Performed by: NURSE PRACTITIONER

## 2023-04-10 PROCEDURE — 81001 URINALYSIS: ICD-10-PCS | Mod: ,,, | Performed by: CLINICAL MEDICAL LABORATORY

## 2023-04-10 PROCEDURE — 81003 URINALYSIS AUTO W/O SCOPE: CPT | Mod: QW,,, | Performed by: NURSE PRACTITIONER

## 2023-04-10 PROCEDURE — 3079F PR MOST RECENT DIASTOLIC BLOOD PRESSURE 80-89 MM HG: ICD-10-PCS | Mod: ,,, | Performed by: NURSE PRACTITIONER

## 2023-04-10 PROCEDURE — 99213 PR OFFICE/OUTPT VISIT, EST, LEVL III, 20-29 MIN: ICD-10-PCS | Mod: ,,, | Performed by: NURSE PRACTITIONER

## 2023-04-10 PROCEDURE — 3008F PR BODY MASS INDEX (BMI) DOCUMENTED: ICD-10-PCS | Mod: ,,, | Performed by: NURSE PRACTITIONER

## 2023-04-10 PROCEDURE — 87086 URINE CULTURE/COLONY COUNT: CPT | Mod: ,,, | Performed by: CLINICAL MEDICAL LABORATORY

## 2023-04-10 PROCEDURE — 1160F PR REVIEW ALL MEDS BY PRESCRIBER/CLIN PHARMACIST DOCUMENTED: ICD-10-PCS | Mod: ,,, | Performed by: NURSE PRACTITIONER

## 2023-04-10 PROCEDURE — 1159F MED LIST DOCD IN RCRD: CPT | Mod: ,,, | Performed by: NURSE PRACTITIONER

## 2023-04-10 PROCEDURE — 83880 ASSAY OF NATRIURETIC PEPTIDE: CPT | Mod: ,,, | Performed by: CLINICAL MEDICAL LABORATORY

## 2023-04-10 PROCEDURE — 1159F PR MEDICATION LIST DOCUMENTED IN MEDICAL RECORD: ICD-10-PCS | Mod: ,,, | Performed by: NURSE PRACTITIONER

## 2023-04-10 PROCEDURE — 1160F RVW MEDS BY RX/DR IN RCRD: CPT | Mod: ,,, | Performed by: NURSE PRACTITIONER

## 2023-04-10 PROCEDURE — 3079F DIAST BP 80-89 MM HG: CPT | Mod: ,,, | Performed by: NURSE PRACTITIONER

## 2023-04-10 PROCEDURE — 81003 POCT URINALYSIS W/O SCOPE: ICD-10-PCS | Mod: QW,,, | Performed by: NURSE PRACTITIONER

## 2023-04-10 PROCEDURE — 99213 OFFICE O/P EST LOW 20 MIN: CPT | Mod: ,,, | Performed by: NURSE PRACTITIONER

## 2023-04-10 PROCEDURE — 3075F PR MOST RECENT SYSTOLIC BLOOD PRESS GE 130-139MM HG: ICD-10-PCS | Mod: ,,, | Performed by: NURSE PRACTITIONER

## 2023-04-10 PROCEDURE — 3288F FALL RISK ASSESSMENT DOCD: CPT | Mod: ,,, | Performed by: NURSE PRACTITIONER

## 2023-04-10 PROCEDURE — 87086 CULTURE, URINE: ICD-10-PCS | Mod: ,,, | Performed by: CLINICAL MEDICAL LABORATORY

## 2023-04-10 RX ORDER — FUROSEMIDE 40 MG/1
40 TABLET ORAL
Status: CANCELLED | OUTPATIENT
Start: 2023-04-10

## 2023-04-10 NOTE — PROGRESS NOTES
Clinic Note    Xavier Rodriguez is a 67 y.o. female     Chief Complaint:   Chief Complaint   Patient presents with    Constipation    Abdominal Pain    Urinary Retention     Having a difficulty voiding, states she is on fluid  tablet but is out of them request refills    Follow-up     This patient daughter call joleen she is hippa for her mother , please check her mother potassium  too see do she needs any supplement, also wants to to be check if she has any edema on her, Thanks.        Subjective:    Patient comes in today complaining of constipation and urinary retention.   Patient reports she has chronic constipation. Patient goes to pain treatment. Admits to taking meds but states nothing works. Denies abdominal pain.  Patient reports she needs lasix to help her void. Patient states she hasn't taken lasix in several months. Denies dysuria or frequency. Patient has long hx of urinary retention compliant. Was seeing urology at [facility].   Patient is a poor historian.     Constipation  Associated symptoms include abdominal pain and difficulty urinating. Pertinent negatives include no diarrhea, fever, nausea or vomiting.   Abdominal Pain  Associated symptoms include constipation. Pertinent negatives include no diarrhea, dysuria, fever, frequency, hematuria, nausea or vomiting.   Follow-up  Associated symptoms include abdominal pain. Pertinent negatives include no chest pain, coughing, fever, nausea or vomiting.      Allergies:   Review of patient's allergies indicates:  No Known Allergies     Past Medical History:  Past Medical History:   Diagnosis Date    Chronic low back pain     Depression     Dysfunctional voiding of urine 11/10/2021    Patient states she does not urinate.  She was taken off of lasix which was the only thing that helped her empty. PVR 0 ml Chronic constipation, lifelong.  Has been out of Linzess for a month now. Renal function WNL when last checked in May    Edema     Hyperlipidemia     Hypertension      IBS (irritable bowel syndrome)         Current Medications:    Current Outpatient Medications:     furosemide (LASIX) 40 MG tablet, Take 40 mg by mouth., Disp: , Rfl:     LINZESS 290 mcg Cap capsule, TAKE 1 CAPSULE (290 MCG TOTAL) BY MOUTH DAILY AS NEEDED., Disp: 30 capsule, Rfl: 2    amitriptyline (ELAVIL) 25 MG tablet, Take 25 mg by mouth., Disp: , Rfl:     HYDROcodone-acetaminophen (NORCO) 7.5-325 mg per tablet, Take 1 tablet by mouth once daily., Disp: 30 tablet, Rfl: 0    HYDROcodone-acetaminophen (NORCO) 7.5-325 mg per tablet, Take 1 tablet by mouth once daily., Disp: 30 tablet, Rfl: 0    HYDROcodone-acetaminophen (NORCO) 7.5-325 mg per tablet, Take 1 tablet by mouth once daily., Disp: 30 tablet, Rfl: 0    HYDROcodone-acetaminophen (NORCO) 7.5-325 mg per tablet, Take 1 tablet by mouth every 24 hours as needed for Pain., Disp: 30 tablet, Rfl: 0    [START ON 4/28/2023] HYDROcodone-acetaminophen (NORCO) 7.5-325 mg per tablet, Take 1 tablet by mouth every 24 hours as needed for Pain., Disp: 30 tablet, Rfl: 0    lovastatin (MEVACOR) 20 MG tablet, Take 1 tablet (20 mg total) by mouth every evening., Disp: 90 tablet, Rfl: 1    naloxegoL (MOVANTIK) 25 mg tablet, Take 25 mg by mouth once daily., Disp: 30 tablet, Rfl: 1    naproxen (NAPROSYN) 500 MG tablet, TAKE 1 TABLET BY MOUTH EVERY DAY, Disp: 30 tablet, Rfl: 0    tamsulosin (FLOMAX) 0.4 mg Cap, , Disp: , Rfl:     topiramate 25 mg capsule, Take 25 mg by mouth., Disp: , Rfl:        Review of Systems   Constitutional:  Negative for fever.   Respiratory:  Negative for cough and shortness of breath.    Cardiovascular:  Negative for chest pain and leg swelling.   Gastrointestinal:  Positive for abdominal pain and constipation. Negative for diarrhea, nausea and vomiting.   Genitourinary:  Positive for difficulty urinating. Negative for dysuria, frequency, hematuria and urgency.        Objective:    /88 (BP Location: Left arm, Patient Position: Sitting, BP Method:  "Medium (Manual))   Pulse 60   Temp 97.9 °F (36.6 °C) (Oral)   Resp 20   Ht 5' 2" (1.575 m)   Wt 78.2 kg (172 lb 4.8 oz)   SpO2 100%   BMI 31.51 kg/m²      Physical Exam  Constitutional:       Appearance: Normal appearance. She is obese.   Eyes:      Extraocular Movements: Extraocular movements intact.   Cardiovascular:      Rate and Rhythm: Normal rate and regular rhythm.      Pulses: Normal pulses.      Heart sounds: Normal heart sounds.   Pulmonary:      Effort: Pulmonary effort is normal.      Breath sounds: Normal breath sounds.   Abdominal:      Palpations: Abdomen is soft.      Tenderness: There is no abdominal tenderness. There is no guarding or rebound.   Musculoskeletal:      Right lower leg: No edema.      Left lower leg: No edema.   Neurological:      Mental Status: She is alert. Mental status is at baseline. She is disoriented.        Assessment and Plan:    1. Urinary retention    2. Constipation, unspecified constipation type    3. Hypokalemia         Urinary retention  -     POCT URINALYSIS W/O SCOPE  -     Urinalysis; Future; Expected date: 04/10/2023  -     Urine Culture High Risk; Future; Expected date: 04/10/2023  -     Ambulatory referral/consult to Urology; Future; Expected date: 04/17/2023    Constipation, unspecified constipation type  -continue current meds    Hypokalemia  -     Basic Metabolic Panel; Future; Expected date: 04/10/2023  -     NT-Pro Natriuretic Peptide; Future; Expected date: 04/10/2023      -Spoke with daughter Jodi on the phone. Will refer to new urologist since Dr. Becerril retiring. Patient has hx of hypokalemia. Daughter states lasix was d/c'ed. Denies swelling and was told did not need any more. Will check bnp before restarting.    There are no Patient Instructions on file for this visit.   Follow up if symptoms worsen or fail to improve.     "

## 2023-04-12 DIAGNOSIS — R60.9 RETENTION OF FLUID: ICD-10-CM

## 2023-04-12 DIAGNOSIS — E87.6 HYPOKALEMIA: Primary | ICD-10-CM

## 2023-04-12 LAB — UA COMPLETE W REFLEX CULTURE PNL UR: NORMAL

## 2023-04-12 RX ORDER — FUROSEMIDE 20 MG/1
20 TABLET ORAL DAILY PRN
Qty: 30 TABLET | Refills: 0 | Status: SHIPPED | OUTPATIENT
Start: 2023-04-12 | End: 2023-08-15 | Stop reason: SDUPTHER

## 2023-04-12 RX ORDER — POTASSIUM CHLORIDE 20 MEQ/1
20 TABLET, EXTENDED RELEASE ORAL DAILY
Qty: 90 TABLET | Refills: 1 | Status: SHIPPED | OUTPATIENT
Start: 2023-04-12 | End: 2023-05-15 | Stop reason: SDUPTHER

## 2023-04-12 NOTE — PROGRESS NOTES
Reviewed labs with patient's daughter Jodi. Add kdur supplement. Patient has had to take supplement in past but not currently. Will repeat bmp lab only in 2 weeks.    Bnp mildly elevated. Rx lasix 20mg daily prn only. Patient was on 40mg daily prn. Instructed daughter prn only for fluid not daily. Will repeat bnp at follow up.    Urine culture negative. Placed referral at visit for urology.

## 2023-04-13 ENCOUNTER — HOSPITAL ENCOUNTER (OUTPATIENT)
Facility: HOSPITAL | Age: 68
Discharge: HOME | End: 2023-04-13
Attending: PAIN MEDICINE | Admitting: PAIN MEDICINE
Payer: COMMERCIAL

## 2023-04-13 ENCOUNTER — ANESTHESIA EVENT (OUTPATIENT)
Dept: PAIN MEDICINE | Facility: HOSPITAL | Age: 68
End: 2023-04-13
Payer: COMMERCIAL

## 2023-04-13 ENCOUNTER — ANESTHESIA (OUTPATIENT)
Dept: PAIN MEDICINE | Facility: HOSPITAL | Age: 68
End: 2023-04-13
Payer: COMMERCIAL

## 2023-04-13 VITALS
HEIGHT: 62 IN | SYSTOLIC BLOOD PRESSURE: 117 MMHG | BODY MASS INDEX: 32.76 KG/M2 | OXYGEN SATURATION: 100 % | HEART RATE: 52 BPM | WEIGHT: 178 LBS | RESPIRATION RATE: 15 BRPM | DIASTOLIC BLOOD PRESSURE: 71 MMHG | TEMPERATURE: 98 F

## 2023-04-13 DIAGNOSIS — M47.817 SPONDYLOSIS OF LUMBOSACRAL REGION WITHOUT MYELOPATHY OR RADICULOPATHY: ICD-10-CM

## 2023-04-13 PROCEDURE — 25000003 PHARM REV CODE 250: Performed by: PAIN MEDICINE

## 2023-04-13 PROCEDURE — 37000008 HC ANESTHESIA 1ST 15 MINUTES: Performed by: PAIN MEDICINE

## 2023-04-13 PROCEDURE — 64493 PR INJ DX/THER AGNT PARAVERT FACET JOINT,IMG GUIDE,LUMBAR/SAC,1ST LVL: ICD-10-PCS | Mod: 50,,, | Performed by: PAIN MEDICINE

## 2023-04-13 PROCEDURE — 64494 INJ PARAVERT F JNT L/S 2 LEV: CPT | Mod: 50,,, | Performed by: PAIN MEDICINE

## 2023-04-13 PROCEDURE — 63600175 PHARM REV CODE 636 W HCPCS: Performed by: PAIN MEDICINE

## 2023-04-13 PROCEDURE — D9220A PRA ANESTHESIA: Mod: ,,, | Performed by: NURSE ANESTHETIST, CERTIFIED REGISTERED

## 2023-04-13 PROCEDURE — 25000003 PHARM REV CODE 250: Performed by: NURSE ANESTHETIST, CERTIFIED REGISTERED

## 2023-04-13 PROCEDURE — 64493 INJ PARAVERT F JNT L/S 1 LEV: CPT | Mod: 50,,, | Performed by: PAIN MEDICINE

## 2023-04-13 PROCEDURE — 27000284 HC CANNULA NASAL: Performed by: NURSE ANESTHETIST, CERTIFIED REGISTERED

## 2023-04-13 PROCEDURE — 64493 INJ PARAVERT F JNT L/S 1 LEV: CPT | Mod: 50 | Performed by: PAIN MEDICINE

## 2023-04-13 PROCEDURE — 37000009 HC ANESTHESIA EA ADD 15 MINS: Performed by: PAIN MEDICINE

## 2023-04-13 PROCEDURE — 64494 PR INJ DX/THER AGNT PARAVERT FACET JOINT,IMG GUIDE,LUMBAR/SAC, 2ND LEVEL: ICD-10-PCS | Mod: 50,,, | Performed by: PAIN MEDICINE

## 2023-04-13 PROCEDURE — 63600175 PHARM REV CODE 636 W HCPCS: Performed by: NURSE ANESTHETIST, CERTIFIED REGISTERED

## 2023-04-13 PROCEDURE — D9220A PRA ANESTHESIA: ICD-10-PCS | Mod: ,,, | Performed by: NURSE ANESTHETIST, CERTIFIED REGISTERED

## 2023-04-13 PROCEDURE — 64494 INJ PARAVERT F JNT L/S 2 LEV: CPT | Mod: RT | Performed by: PAIN MEDICINE

## 2023-04-13 RX ORDER — BUPIVACAINE HYDROCHLORIDE 2.5 MG/ML
INJECTION, SOLUTION INFILTRATION; PERINEURAL CODE/TRAUMA/SEDATION MEDICATION
Status: DISCONTINUED | OUTPATIENT
Start: 2023-04-13 | End: 2023-04-13 | Stop reason: HOSPADM

## 2023-04-13 RX ORDER — SODIUM CHLORIDE 9 MG/ML
INJECTION, SOLUTION INTRAVENOUS CONTINUOUS
Status: DISCONTINUED | OUTPATIENT
Start: 2023-04-13 | End: 2023-04-13 | Stop reason: HOSPADM

## 2023-04-13 RX ORDER — PROPOFOL 10 MG/ML
VIAL (ML) INTRAVENOUS
Status: DISCONTINUED | OUTPATIENT
Start: 2023-04-13 | End: 2023-04-13

## 2023-04-13 RX ORDER — LIDOCAINE HYDROCHLORIDE 20 MG/ML
INJECTION, SOLUTION EPIDURAL; INFILTRATION; INTRACAUDAL; PERINEURAL
Status: DISCONTINUED | OUTPATIENT
Start: 2023-04-13 | End: 2023-04-13

## 2023-04-13 RX ORDER — TRIAMCINOLONE ACETONIDE 40 MG/ML
INJECTION, SUSPENSION INTRA-ARTICULAR; INTRAMUSCULAR CODE/TRAUMA/SEDATION MEDICATION
Status: DISCONTINUED | OUTPATIENT
Start: 2023-04-13 | End: 2023-04-13 | Stop reason: HOSPADM

## 2023-04-13 RX ADMIN — PROPOFOL 100 MG: 10 INJECTION, EMULSION INTRAVENOUS at 10:04

## 2023-04-13 RX ADMIN — LIDOCAINE HYDROCHLORIDE 100 MG: 20 INJECTION, SOLUTION INTRAVENOUS at 10:04

## 2023-04-13 RX ADMIN — SODIUM CHLORIDE: 9 INJECTION, SOLUTION INTRAVENOUS at 10:04

## 2023-04-13 RX ADMIN — PROPOFOL 50 MG: 10 INJECTION, EMULSION INTRAVENOUS at 10:04

## 2023-04-13 NOTE — TRANSFER OF CARE
"Anesthesia Transfer of Care Note    Patient: Xavier Rodriguez    Procedure(s) Performed: Procedure(s) (LRB):  Bilateral L4-5,5-S1 MBB (Bilateral)    Patient location: PACU    Anesthesia Type: general    Transport from OR: Transported from OR on room air with adequate spontaneous ventilation    Post pain: adequate analgesia    Post assessment: no apparent anesthetic complications    Post vital signs: stable    Level of consciousness: sedated    Nausea/Vomiting: no nausea/vomiting    Complications: none    Transfer of care protocol was followed      Last vitals:   Visit Vitals  BP (!) 96/46   Pulse (!) 48   Temp 36.7 °C (98 °F) (Skin)   Resp 17   Ht 5' 2" (1.575 m)   Wt 80.7 kg (178 lb)   SpO2 97%   BMI 32.56 kg/m²     "

## 2023-04-13 NOTE — OP NOTE
Procedure Note    Procedure Date: 4/13/2023    Procedure Performed:  Bilateral  Lumbar Facet  Medial Branch Block @ L4-5,5-S1 with Fluoroscopic Guidance    Indications: Patient has failed conservative therapy.      Pre-op diagnosis: Lumbar Spondylosis    Post-op diagnosis: same    Physician: RAMONA Mendez MD    Anesthesia: MAC    Estimated Blood Loss: Less than 1cc    IVF: Per Anesthesia    Complications: None    Technique:  The patient was interviewed in the holding area and Risks/Benefits were discussed, including but not limited to  the possibility of new or different pain, bleeding or infection.   All questions were answered.  The patient understood and accepted risks.  Consent was reviewed and signed.  A time out was taken to identify the patient, procedure and side of the procedure. The patient was placed in a prone position, then prepped and draped in the usual sterile fashion using ChloraPrep and sterile towels.  The levels were determined under fluoroscopic guidance and then marked.  1% Lidocaine was given by raising a skin wheal at the skin over each site and then infiltrated.  A 22-gauge 3.5 inch needle was introduced to the anatomic location of the bilateral L4-5,5-Y0vivgsl branch nerves.  Then, after negative aspiration, 1 cc of a 1:1 mixture of  (Bupivacaine 0.25% 3cc and  40mg kenalog ) was injected @ each level.The patient tolerated the procedure well and was transferred to the [facility] in stable condition.  The patient was monitored after the procedure.  Patient was given post procedure and discharge instructions to follow at home.  The patient was discharged in a stable condition with an adult .

## 2023-04-13 NOTE — BRIEF OP NOTE
Discharge Note  Short Stay    Admit Date: 4/13/2023    Discharge Date: 4/13/2023    Attending Physician: Ilsa Mendez     Discharge Provider: Ilsa Mendez    Diagnosis: Lumbosacral spondylosis    Discharged Condition: Good    Final Diagnoses: Spondylosis of lumbosacral region without myelopathy or radiculopathy [M47.817]    Disposition: Home or Self Care    Hospital Course: No complications, uneventful    Outcome of Hospitalization, Treatment, Procedure, or Surgery:  Patient was admitted for outpatient interventional pain management procedure. The patient tolerated the procedure well with no complications.    Follow up/Patient Instructions:  Follow up as scheduled in Pain Management office in 3-4 weeks.  Patient has received instructions and follow up date and time.    Medications:  Continue previous medications

## 2023-04-13 NOTE — ANESTHESIA PREPROCEDURE EVALUATION
04/13/2023  Xavier Rodriguez is a 67 y.o., female.      Pre-op Assessment    I have reviewed the Patient Summary Reports.    I have reviewed the NPO Status.   I have reviewed the Medications.     Review of Systems  Anesthesia Hx:  No problems with previous Anesthesia    Social:  Non-Smoker, No Alcohol Use    Hematology/Oncology:     Oncology Normal     EENT/Dental:EENT/Dental Normal   Cardiovascular:   Hypertension hyperlipidemia    Pulmonary:  Pulmonary Normal    Musculoskeletal:  Spine Disorders: lumbar Degenerative disease and Chronic Pain    Neurological:   Neuromuscular Disease,   Chronic Pain Syndrome   Endocrine:  Obesity / BMI > 30  Psych:   Psychiatric History depression          Physical Exam  General: Well nourished, Cooperative and Alert    Airway:  Mallampati: III   Mouth Opening: Normal  TM Distance: Normal  Tongue: Large        Anesthesia Plan  Type of Anesthesia, risks & benefits discussed:    Anesthesia Type: Gen Natural Airway  Intra-op Monitoring Plan: Standard ASA Monitors  Post Op Pain Control Plan: multimodal analgesia  Induction:  IV  Informed Consent: Informed consent signed with the Patient and all parties understand the risks and agree with anesthesia plan.  All questions answered.   ASA Score: 3  Day of Surgery Review of History & Physical: I have interviewed and examined the patient. I have reviewed the patient's H&P dated: There are no significant changes.     Ready For Surgery From Anesthesia Perspective.     .

## 2023-04-13 NOTE — DISCHARGE SUMMARY
[facility] - Pain Management  Discharge Note  Short Stay    Procedure(s) (LRB):  Bilateral L4-5,5-S1 MBB (Bilateral)      OUTCOME: Patient tolerated treatment/procedure well without complication and is now ready for discharge.    DISPOSITION: Home or Self Care    FINAL DIAGNOSIS:  Lumbosacral spondylosis    FOLLOWUP: In clinic    DISCHARGE INSTRUCTIONS:  See nurse's notes     TIME SPENT ON DISCHARGE: 5 minutes

## 2023-04-13 NOTE — PLAN OF CARE
Plan:  D/c pt via wheelchair at 1147  Informed pt if does not void in 8 hours to go to ER. Notify if redness, drainage, from injection site or fever over next 3-4 days. Rest and drink plenty of fluids for the remainder of the day. No lifting over 5 lbs. For the remainder of the day. Continue regular medications as prescribed. May take pain medications as prescribed.     Pain improved 100%

## 2023-04-13 NOTE — ANESTHESIA POSTPROCEDURE EVALUATION
Anesthesia Post Evaluation    Patient: Xavier Rodriguez    Procedure(s) Performed: Procedure(s) (LRB):  Bilateral L4-5,5-S1 MBB (Bilateral)    Final Anesthesia Type: general      Patient location during evaluation: PACU  Patient participation: Yes- Able to Participate  Level of consciousness: awake and alert  Post-procedure vital signs: reviewed and stable  Pain management: adequate  Airway patency: patent    PONV status at discharge: No PONV  Anesthetic complications: no      Cardiovascular status: blood pressure returned to baseline  Respiratory status: unassisted  Hydration status: euvolemic  Follow-up not needed.          Vitals Value Taken Time   /81 04/13/23 1124   Temp 36.7 °C (98 °F) 04/13/23 1105   Pulse 59 04/13/23 1129   Resp 8 04/13/23 1129   SpO2 100 % 04/13/23 1129   Vitals shown include unvalidated device data.      No case tracking events are documented in the log.      Pain/Capri Score: Capri Score: 9 (4/13/2023 11:17 AM)

## 2023-04-26 ENCOUNTER — TELEPHONE (OUTPATIENT)
Dept: SURGERY | Facility: CLINIC | Age: 68
End: 2023-04-26
Payer: COMMERCIAL

## 2023-04-26 DIAGNOSIS — Z12.11 ENCOUNTER FOR SCREENING COLONOSCOPY: Primary | ICD-10-CM

## 2023-05-03 ENCOUNTER — HOSPITAL ENCOUNTER (OUTPATIENT)
Dept: GASTROENTEROLOGY | Facility: HOSPITAL | Age: 68
Discharge: HOME | End: 2023-05-03
Attending: SURGERY
Payer: COMMERCIAL

## 2023-05-03 DIAGNOSIS — Z12.11 ENCOUNTER FOR SCREENING COLONOSCOPY: ICD-10-CM

## 2023-05-11 ENCOUNTER — PATIENT OUTREACH (OUTPATIENT)
Dept: ADMINISTRATIVE | Facility: HOSPITAL | Age: 68
End: 2023-05-11

## 2023-05-11 ENCOUNTER — PATIENT MESSAGE (OUTPATIENT)
Dept: ADMINISTRATIVE | Facility: HOSPITAL | Age: 68
End: 2023-05-11

## 2023-05-11 NOTE — PROGRESS NOTES
Documentation for mammogram found in [facility] [employer] on 6/20/2016 but unable to retrieve.   Patient NO SHOW for Mammo appt in 2023.  Message sent in patient portal.

## 2023-05-15 ENCOUNTER — OFFICE VISIT (OUTPATIENT)
Dept: FAMILY MEDICINE | Facility: CLINIC | Age: 68
End: 2023-05-15
Payer: COMMERCIAL

## 2023-05-15 VITALS
SYSTOLIC BLOOD PRESSURE: 129 MMHG | WEIGHT: 160.63 LBS | HEART RATE: 60 BPM | BODY MASS INDEX: 29.56 KG/M2 | TEMPERATURE: 98 F | OXYGEN SATURATION: 96 % | RESPIRATION RATE: 18 BRPM | DIASTOLIC BLOOD PRESSURE: 71 MMHG | HEIGHT: 62 IN

## 2023-05-15 DIAGNOSIS — R60.9 RETENTION OF FLUID: ICD-10-CM

## 2023-05-15 DIAGNOSIS — T40.2X5A THERAPEUTIC OPIOID-INDUCED CONSTIPATION (OIC): Chronic | ICD-10-CM

## 2023-05-15 DIAGNOSIS — Z12.31 ENCOUNTER FOR SCREENING MAMMOGRAM FOR MALIGNANT NEOPLASM OF BREAST: ICD-10-CM

## 2023-05-15 DIAGNOSIS — R33.9 URINARY RETENTION: ICD-10-CM

## 2023-05-15 DIAGNOSIS — K59.03 THERAPEUTIC OPIOID-INDUCED CONSTIPATION (OIC): Chronic | ICD-10-CM

## 2023-05-15 DIAGNOSIS — G89.4 CHRONIC PAIN SYNDROME: ICD-10-CM

## 2023-05-15 DIAGNOSIS — E87.6 HYPOKALEMIA: Primary | ICD-10-CM

## 2023-05-15 DIAGNOSIS — Z13.820 ENCOUNTER FOR OSTEOPOROSIS SCREENING IN ASYMPTOMATIC POSTMENOPAUSAL PATIENT: ICD-10-CM

## 2023-05-15 DIAGNOSIS — Z78.0 ENCOUNTER FOR OSTEOPOROSIS SCREENING IN ASYMPTOMATIC POSTMENOPAUSAL PATIENT: ICD-10-CM

## 2023-05-15 LAB
ALBUMIN SERPL BCP-MCNC: 3.7 G/DL (ref 3.5–5)
ALBUMIN/GLOB SERPL: 1.1 {RATIO}
ALP SERPL-CCNC: 66 U/L (ref 55–142)
ALT SERPL W P-5'-P-CCNC: 12 U/L (ref 13–56)
ANION GAP SERPL CALCULATED.3IONS-SCNC: 8 MMOL/L (ref 7–16)
AST SERPL W P-5'-P-CCNC: 13 U/L (ref 15–37)
BASOPHILS # BLD AUTO: 0.01 K/UL (ref 0–0.2)
BASOPHILS NFR BLD AUTO: 0.2 % (ref 0–1)
BILIRUB SERPL-MCNC: 0.9 MG/DL (ref ?–1.2)
BUN SERPL-MCNC: 9 MG/DL (ref 7–18)
BUN/CREAT SERPL: 10 (ref 6–20)
CALCIUM SERPL-MCNC: 8.6 MG/DL (ref 8.5–10.1)
CHLORIDE SERPL-SCNC: 107 MMOL/L (ref 98–107)
CO2 SERPL-SCNC: 27 MMOL/L (ref 21–32)
CREAT SERPL-MCNC: 0.87 MG/DL (ref 0.55–1.02)
DIFFERENTIAL METHOD BLD: ABNORMAL
EGFR (NO RACE VARIABLE) (RUSH/TITUS): 73 ML/MIN/1.73M2
EOSINOPHIL # BLD AUTO: 0.01 K/UL (ref 0–0.5)
EOSINOPHIL NFR BLD AUTO: 0.2 % (ref 1–4)
ERYTHROCYTE [DISTWIDTH] IN BLOOD BY AUTOMATED COUNT: 15 % (ref 11.5–14.5)
GLOBULIN SER-MCNC: 3.4 G/DL (ref 2–4)
GLUCOSE SERPL-MCNC: 83 MG/DL (ref 74–106)
HCT VFR BLD AUTO: 39 % (ref 38–47)
HGB BLD-MCNC: 12 G/DL (ref 12–16)
IMM GRANULOCYTES # BLD AUTO: 0.43 K/UL (ref 0–0.04)
IMM GRANULOCYTES NFR BLD: 9.8 % (ref 0–0.4)
LYMPHOCYTES # BLD AUTO: 1.99 K/UL (ref 1–4.8)
LYMPHOCYTES NFR BLD AUTO: 45.2 % (ref 27–41)
LYMPHOCYTES NFR BLD MANUAL: 62 % (ref 27–41)
MCH RBC QN AUTO: 27.8 PG (ref 27–31)
MCHC RBC AUTO-ENTMCNC: 30.8 G/DL (ref 32–36)
MCV RBC AUTO: 90.5 FL (ref 80–96)
MONOCYTES # BLD AUTO: 0.25 K/UL (ref 0–0.8)
MONOCYTES NFR BLD AUTO: 5.7 % (ref 2–6)
MONOCYTES NFR BLD MANUAL: 4 % (ref 2–6)
MPC BLD CALC-MCNC: 10.9 FL (ref 9.4–12.4)
NEUTROPHILS # BLD AUTO: 1.71 K/UL (ref 1.8–7.7)
NEUTROPHILS NFR BLD AUTO: 38.9 % (ref 53–65)
NEUTS SEG NFR BLD MANUAL: 34 % (ref 50–62)
NRBC # BLD AUTO: 0 X10E3/UL
NRBC, AUTO (.00): 0 %
NT-PROBNP SERPL-MCNC: 21 PG/ML (ref 1–125)
PLATELET # BLD AUTO: 274 K/UL (ref 150–400)
PLATELET MORPHOLOGY: NORMAL
POTASSIUM SERPL-SCNC: 3.8 MMOL/L (ref 3.5–5.1)
PROT SERPL-MCNC: 7.1 G/DL (ref 6.4–8.2)
RBC # BLD AUTO: 4.31 M/UL (ref 4.2–5.4)
RBC MORPH BLD: NORMAL
SODIUM SERPL-SCNC: 138 MMOL/L (ref 136–145)
WBC # BLD AUTO: 4.4 K/UL (ref 4.5–11)

## 2023-05-15 PROCEDURE — 1160F PR REVIEW ALL MEDS BY PRESCRIBER/CLIN PHARMACIST DOCUMENTED: ICD-10-PCS | Mod: ,,, | Performed by: NURSE PRACTITIONER

## 2023-05-15 PROCEDURE — 99214 OFFICE O/P EST MOD 30 MIN: CPT | Mod: ,,, | Performed by: NURSE PRACTITIONER

## 2023-05-15 PROCEDURE — 1160F RVW MEDS BY RX/DR IN RCRD: CPT | Mod: ,,, | Performed by: NURSE PRACTITIONER

## 2023-05-15 PROCEDURE — 3288F PR FALLS RISK ASSESSMENT DOCUMENTED: ICD-10-PCS | Mod: ,,, | Performed by: NURSE PRACTITIONER

## 2023-05-15 PROCEDURE — 3008F BODY MASS INDEX DOCD: CPT | Mod: ,,, | Performed by: NURSE PRACTITIONER

## 2023-05-15 PROCEDURE — 83880 NT-PRO NATRIURETIC PEPTIDE: ICD-10-PCS | Mod: ,,, | Performed by: CLINICAL MEDICAL LABORATORY

## 2023-05-15 PROCEDURE — 3074F SYST BP LT 130 MM HG: CPT | Mod: ,,, | Performed by: NURSE PRACTITIONER

## 2023-05-15 PROCEDURE — 1101F PT FALLS ASSESS-DOCD LE1/YR: CPT | Mod: ,,, | Performed by: NURSE PRACTITIONER

## 2023-05-15 PROCEDURE — 1126F PR PAIN SEVERITY QUANTIFIED, NO PAIN PRESENT: ICD-10-PCS | Mod: ,,, | Performed by: NURSE PRACTITIONER

## 2023-05-15 PROCEDURE — 1101F PR PT FALLS ASSESS DOC 0-1 FALLS W/OUT INJ PAST YR: ICD-10-PCS | Mod: ,,, | Performed by: NURSE PRACTITIONER

## 2023-05-15 PROCEDURE — 83880 ASSAY OF NATRIURETIC PEPTIDE: CPT | Mod: ,,, | Performed by: CLINICAL MEDICAL LABORATORY

## 2023-05-15 PROCEDURE — 80053 COMPREHENSIVE METABOLIC PANEL: ICD-10-PCS | Mod: ,,, | Performed by: CLINICAL MEDICAL LABORATORY

## 2023-05-15 PROCEDURE — 3288F FALL RISK ASSESSMENT DOCD: CPT | Mod: ,,, | Performed by: NURSE PRACTITIONER

## 2023-05-15 PROCEDURE — 3078F DIAST BP <80 MM HG: CPT | Mod: ,,, | Performed by: NURSE PRACTITIONER

## 2023-05-15 PROCEDURE — 1126F AMNT PAIN NOTED NONE PRSNT: CPT | Mod: ,,, | Performed by: NURSE PRACTITIONER

## 2023-05-15 PROCEDURE — 3074F PR MOST RECENT SYSTOLIC BLOOD PRESSURE < 130 MM HG: ICD-10-PCS | Mod: ,,, | Performed by: NURSE PRACTITIONER

## 2023-05-15 PROCEDURE — 1159F PR MEDICATION LIST DOCUMENTED IN MEDICAL RECORD: ICD-10-PCS | Mod: ,,, | Performed by: NURSE PRACTITIONER

## 2023-05-15 PROCEDURE — 1159F MED LIST DOCD IN RCRD: CPT | Mod: ,,, | Performed by: NURSE PRACTITIONER

## 2023-05-15 PROCEDURE — 85025 CBC WITH DIFFERENTIAL: ICD-10-PCS | Mod: GZ,,, | Performed by: CLINICAL MEDICAL LABORATORY

## 2023-05-15 PROCEDURE — 3008F PR BODY MASS INDEX (BMI) DOCUMENTED: ICD-10-PCS | Mod: ,,, | Performed by: NURSE PRACTITIONER

## 2023-05-15 PROCEDURE — 80053 COMPREHEN METABOLIC PANEL: CPT | Mod: ,,, | Performed by: CLINICAL MEDICAL LABORATORY

## 2023-05-15 PROCEDURE — 85025 COMPLETE CBC W/AUTO DIFF WBC: CPT | Mod: GZ,,, | Performed by: CLINICAL MEDICAL LABORATORY

## 2023-05-15 PROCEDURE — 3078F PR MOST RECENT DIASTOLIC BLOOD PRESSURE < 80 MM HG: ICD-10-PCS | Mod: ,,, | Performed by: NURSE PRACTITIONER

## 2023-05-15 PROCEDURE — 99214 PR OFFICE/OUTPT VISIT, EST, LEVL IV, 30-39 MIN: ICD-10-PCS | Mod: ,,, | Performed by: NURSE PRACTITIONER

## 2023-05-15 RX ORDER — NALOXEGOL OXALATE 25 MG/1
25 TABLET, FILM COATED ORAL DAILY
Qty: 90 TABLET | Refills: 1 | Status: SHIPPED | OUTPATIENT
Start: 2023-05-15

## 2023-05-15 RX ORDER — POTASSIUM CHLORIDE 20 MEQ/1
20 TABLET, EXTENDED RELEASE ORAL DAILY
Qty: 90 TABLET | Refills: 1 | Status: SHIPPED | OUTPATIENT
Start: 2023-05-15 | End: 2023-08-15 | Stop reason: SDUPTHER

## 2023-05-15 RX ORDER — TAMSULOSIN HYDROCHLORIDE 0.4 MG/1
0.4 CAPSULE ORAL DAILY
Qty: 90 CAPSULE | Refills: 1 | Status: SHIPPED | OUTPATIENT
Start: 2023-05-15

## 2023-05-15 RX ORDER — NAPROXEN 500 MG/1
500 TABLET ORAL DAILY PRN
Qty: 30 TABLET | Refills: 1 | Status: SHIPPED | OUTPATIENT
Start: 2023-05-15 | End: 2023-07-10

## 2023-05-15 NOTE — PROGRESS NOTES
Clinic Note    Xavier Rodriguez is a 67 y.o. female     Chief Complaint:   Chief Complaint   Patient presents with    Medication Refill     Pt states she needs medication refills and would like her potassium checked.     healthcare maintenance     Mammogram Never done-refused   Hemoglobin A1c (Diabetic Prevention Screening) Never done-refused   DEXA Scan Never done-  Colorectal Cancer Screening due on 05/13/2019-refused         Subjective:    Patient comes in today with granddaughter. Reports needs medication refills. Denies any adverse side effects. Patient poor historian. Denies any complaints or concerns.  Goes to pain treatment.          Allergies:   Review of patient's allergies indicates:  No Known Allergies     Past Medical History:  Past Medical History:   Diagnosis Date    Chronic low back pain     Depression     Dysfunctional voiding of urine 11/10/2021    Patient states she does not urinate.  She was taken off of lasix which was the only thing that helped her empty. PVR 0 ml Chronic constipation, lifelong.  Has been out of Linzess for a month now. Renal function WNL when last checked in May    Edema     Hyperlipidemia     Hypertension     IBS (irritable bowel syndrome)         Current Medications:    Current Outpatient Medications:     furosemide (LASIX) 20 MG tablet, Take 1 tablet (20 mg total) by mouth daily as needed., Disp: 30 tablet, Rfl: 0    HYDROcodone-acetaminophen (NORCO) 7.5-325 mg per tablet, Take 1 tablet by mouth once daily., Disp: 30 tablet, Rfl: 0    HYDROcodone-acetaminophen (NORCO) 7.5-325 mg per tablet, Take 1 tablet by mouth once daily., Disp: 30 tablet, Rfl: 0    HYDROcodone-acetaminophen (NORCO) 7.5-325 mg per tablet, Take 1 tablet by mouth once daily., Disp: 30 tablet, Rfl: 0    HYDROcodone-acetaminophen (NORCO) 7.5-325 mg per tablet, Take 1 tablet by mouth every 24 hours as needed for Pain., Disp: 30 tablet, Rfl: 0    LINZESS 290 mcg Cap capsule, TAKE 1 CAPSULE (290 MCG TOTAL) BY  "MOUTH DAILY AS NEEDED., Disp: 30 capsule, Rfl: 2    lovastatin (MEVACOR) 20 MG tablet, TAKE 1 TABLET BY MOUTH EVERY DAY IN THE EVENING, Disp: 90 tablet, Rfl: 1    topiramate 25 mg capsule, Take 25 mg by mouth., Disp: , Rfl:     amitriptyline (ELAVIL) 25 MG tablet, Take 25 mg by mouth., Disp: , Rfl:     naloxegoL (MOVANTIK) 25 mg tablet, Take 25 mg by mouth once daily., Disp: 90 tablet, Rfl: 1    naproxen (NAPROSYN) 500 MG tablet, Take 1 tablet (500 mg total) by mouth daily as needed., Disp: 30 tablet, Rfl: 1    potassium chloride SA (K-DUR,KLOR-CON) 20 MEQ tablet, Take 1 tablet (20 mEq total) by mouth once daily., Disp: 90 tablet, Rfl: 1    tamsulosin (FLOMAX) 0.4 mg Cap, Take 1 capsule (0.4 mg total) by mouth once daily., Disp: 90 capsule, Rfl: 1       Review of Systems   Constitutional:  Negative for fever.   Respiratory:  Negative for cough and shortness of breath.    Cardiovascular:  Negative for chest pain.   Gastrointestinal:  Positive for constipation. Negative for abdominal pain, nausea and vomiting.   Genitourinary:  Negative for dysuria.        Objective:    /71 (BP Location: Left arm, Patient Position: Sitting, BP Method: Medium (Automatic))   Pulse 60   Temp 98.2 °F (36.8 °C) (Oral)   Resp 18   Ht 5' 2" (1.575 m)   Wt 72.8 kg (160 lb 9.6 oz)   SpO2 96%   BMI 29.37 kg/m²      Physical Exam  Eyes:      Extraocular Movements: Extraocular movements intact.   Cardiovascular:      Rate and Rhythm: Normal rate and regular rhythm.      Pulses: Normal pulses.      Heart sounds: Normal heart sounds.   Pulmonary:      Effort: Pulmonary effort is normal.      Breath sounds: Normal breath sounds.   Abdominal:      Palpations: Abdomen is soft.      Tenderness: There is no abdominal tenderness. There is no guarding or rebound.   Musculoskeletal:      Right lower leg: No edema.      Left lower leg: No edema.      Comments: cane   Neurological:      Mental Status: She is alert. Mental status is at baseline. " She is disoriented.      Gait: Gait abnormal.        Assessment and Plan:    1. Hypokalemia    2. Therapeutic opioid-induced constipation (OIC)    3. Urinary retention    4. Chronic pain syndrome    5. Retention of fluid    6. Encounter for screening mammogram for malignant neoplasm of breast    7. Encounter for osteoporosis screening in asymptomatic postmenopausal patient         Hypokalemia  -     potassium chloride SA (K-DUR,KLOR-CON) 20 MEQ tablet; Take 1 tablet (20 mEq total) by mouth once daily.  Dispense: 90 tablet; Refill: 1  -     Comprehensive Metabolic Panel; Future; Expected date: 05/15/2023  -     Cancel: Basic Metabolic Panel    Therapeutic opioid-induced constipation (OIC)  -     naloxegoL (MOVANTIK) 25 mg tablet; Take 25 mg by mouth once daily.  Dispense: 90 tablet; Refill: 1    Urinary retention  -     tamsulosin (FLOMAX) 0.4 mg Cap; Take 1 capsule (0.4 mg total) by mouth once daily.  Dispense: 90 capsule; Refill: 1  -     NT-Pro Natriuretic Peptide; Future; Expected date: 05/15/2023    Chronic pain syndrome  -     naproxen (NAPROSYN) 500 MG tablet; Take 1 tablet (500 mg total) by mouth daily as needed.  Dispense: 30 tablet; Refill: 1  -     CBC Auto Differential; Future; Expected date: 05/15/2023    Retention of fluid  -     NT-Pro Natriuretic Peptide    Encounter for screening mammogram for malignant neoplasm of breast  -     Mammo Digital Screening Bilat; Future; Expected date: 05/15/2023    Encounter for osteoporosis screening in asymptomatic postmenopausal patient  -     DXA Bone Density Axial Skeleton 1 or more sites; Future; Expected date: 05/15/2023          There are no Patient Instructions on file for this visit.   Follow up in about 3 months (around 8/15/2023).

## 2023-06-02 PROBLEM — M47.817 LUMBOSACRAL SPONDYLOSIS WITHOUT MYELOPATHY: Chronic | Status: ACTIVE | Noted: 2023-06-02

## 2023-06-14 ENCOUNTER — TELEPHONE (OUTPATIENT)
Dept: SURGERY | Facility: CLINIC | Age: 68
End: 2023-06-14
Payer: COMMERCIAL

## 2023-06-14 DIAGNOSIS — Z12.11 ENCOUNTER FOR SCREENING COLONOSCOPY: Primary | ICD-10-CM

## 2023-06-15 NOTE — PROGRESS NOTES
Subjective:         Patient ID: Xavier Rodriguez is a 67 y.o. female.    Chief Complaint: Low-back Pain        Pain  This is a chronic problem. The current episode started more than 1 year ago. The problem occurs daily. The problem has been waxing and waning. Associated symptoms include arthralgias, headaches and neck pain. Pertinent negatives include no anorexia, change in bowel habit, chest pain, chills, fever, sore throat, swollen glands, urinary symptoms or vertigo.   Review of Systems   Constitutional:  Negative for activity change, appetite change, chills, fever and unexpected weight change.   HENT:  Negative for drooling, ear discharge, facial swelling, mouth dryness, nosebleeds, sore throat, trouble swallowing, voice change and goiter.    Eyes:  Negative for discharge and visual disturbance.   Respiratory:  Negative for apnea, choking, chest tightness, shortness of breath, wheezing and stridor.    Cardiovascular:  Negative for chest pain, palpitations and leg swelling.   Gastrointestinal:  Negative for abdominal distention, anorexia, change in bowel habit, diarrhea, rectal pain, fecal incontinence and change in bowel habit.   Endocrine: Negative for cold intolerance, heat intolerance, polydipsia, polyphagia and polyuria.   Genitourinary:  Negative for flank pain, frequency and hot flashes.   Musculoskeletal:  Positive for arthralgias, leg pain and neck pain.   Integumentary:  Negative for color change and pallor.   Allergic/Immunologic: Negative for immunocompromised state.   Neurological:  Positive for headaches. Negative for vertigo, syncope, facial asymmetry, speech difficulty, light-headedness, coordination difficulties, memory loss and coordination difficulties.   Hematological:  Negative for adenopathy. Does not bruise/bleed easily.   Psychiatric/Behavioral:  Negative for agitation, behavioral problems, confusion, decreased concentration, dysphoric mood, hallucinations, self-injury and suicidal ideas.  "The patient is not nervous/anxious and is not hyperactive.          Past Medical History:   Diagnosis Date    Chronic low back pain     Depression     Dysfunctional voiding of urine 11/10/2021    Patient states she does not urinate.  She was taken off of lasix which was the only thing that helped her empty. PVR 0 ml Chronic constipation, lifelong.  Has been out of Linzess for a month now. 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    INJECTION OF ANESTHETIC AGENT AROUND MEDIAL BRANCH NERVES INNERVATING LUMBAR FACET JOINT Bilateral 4/13/2023    Procedure: Bilateral L4-5,5-S1 MBB;  Surgeon: Ilsa Mendez MD;  Location: [facility];  Service: Pain Management;  Laterality: Bilateral;    TUMOR REMOVAL       Social History     Socioeconomic History    Marital status: Single   Tobacco Use    Smoking status: Former     Packs/day: 0.00     Years: 0.00     Pack years: 0.00     Types: Cigarettes     Passive exposure: Past    Smokeless tobacco: Never    Tobacco comments:     Stop smoking about 15 years ago   Substance and Sexual Activity    Alcohol use: Never    Drug use: Never    Sexual activity: Not Currently     Partners: Male     Birth control/protection: None     Family History   Problem Relation Age of Onset    Heart disease Mother     Heart attack Mother     Heart disease Father         Heart attack    Heart attack Father      Review of patient's allergies indicates:  No Known Allergies     Objective:  Vitals:    06/19/23 1312   BP: (!) 146/78   Pulse: 64   Resp: 20   Weight: 79.4 kg (175 lb)   Height: 5' 2" (1.575 m)   PainSc:   9         Physical Exam  Vitals and nursing note reviewed. Exam conducted with a chaperone present.   Constitutional:       General: She is awake. She is not in acute distress.     Appearance: " Normal appearance. She is not ill-appearing, toxic-appearing or diaphoretic.   HENT:      Head: Normocephalic and atraumatic.      Nose: Nose normal.      Mouth/Throat:      Mouth: Mucous membranes are moist.      Pharynx: Oropharynx is clear.   Eyes:      Conjunctiva/sclera: Conjunctivae normal.      Pupils: Pupils are equal, round, and reactive to light.   Cardiovascular:      Rate and Rhythm: Normal rate.   Pulmonary:      Effort: Pulmonary effort is normal. No respiratory distress.   Abdominal:      Palpations: Abdomen is soft.      Tenderness: There is no guarding.   Musculoskeletal:         General: Normal range of motion.      Cervical back: Normal range of motion and neck supple. No rigidity.      Thoracic back: Tenderness present.      Lumbar back: Tenderness present.   Skin:     General: Skin is warm and dry.      Coloration: Skin is not jaundiced or pale.   Neurological:      General: No focal deficit present.      Mental Status: She is alert and oriented to person, place, and time. Mental status is at baseline.      Cranial Nerves: No cranial nerve deficit (II-XII).   Psychiatric:         Mood and Affect: Mood normal.         Behavior: Behavior normal. Behavior is cooperative.         Thought Content: Thought content normal.         FL Fluoro for Pain Management  See OP Notes for results.     IMPRESSION: See OP Notes for results.     This procedure was auto-finalized by: Virtual Radiologist         Office Visit on 05/15/2023   Component Date Value Ref Range Status    ProBNP 05/15/2023 21  1 - 125 pg/mL Final    Sodium 05/15/2023 138  136 - 145 mmol/L Final    Potassium 05/15/2023 3.8  3.5 - 5.1 mmol/L Final    Chloride 05/15/2023 107  98 - 107 mmol/L Final    CO2 05/15/2023 27  21 - 32 mmol/L Final    Anion Gap 05/15/2023 8  7 - 16 mmol/L Final    Glucose 05/15/2023 83  74 - 106 mg/dL Final    BUN 05/15/2023 9  7 - 18 mg/dL Final    Creatinine 05/15/2023 0.87  0.55 - 1.02 mg/dL Final    BUN/Creatinine  Ratio 05/15/2023 10  6 - 20 Final    Calcium 05/15/2023 8.6  8.5 - 10.1 mg/dL Final    Total Protein 05/15/2023 7.1  6.4 - 8.2 g/dL Final    Albumin 05/15/2023 3.7  3.5 - 5.0 g/dL Final    Globulin 05/15/2023 3.4  2.0 - 4.0 g/dL Final    A/G Ratio 05/15/2023 1.1   Final    Bilirubin, Total 05/15/2023 0.9  >0.0 - 1.2 mg/dL Final    Alk Phos 05/15/2023 66  55 - 142 U/L Final    ALT 05/15/2023 12 (L)  13 - 56 U/L Final    AST 05/15/2023 13 (L)  15 - 37 U/L Final    eGFR 05/15/2023 73  >=60 mL/min/1.73m2 Final    WBC 05/15/2023 4.40 (L)  4.50 - 11.00 K/uL Final    RBC 05/15/2023 4.31  4.20 - 5.40 M/uL Final    Hemoglobin 05/15/2023 12.0  12.0 - 16.0 g/dL Final    Hematocrit 05/15/2023 39.0  38.0 - 47.0 % Final    MCV 05/15/2023 90.5  80.0 - 96.0 fL Final    MCH 05/15/2023 27.8  27.0 - 31.0 pg Final    MCHC 05/15/2023 30.8 (L)  32.0 - 36.0 g/dL Final    RDW 05/15/2023 15.0 (H)  11.5 - 14.5 % Final    Platelet Count 05/15/2023 274  150 - 400 K/uL Final    MPV 05/15/2023 10.9  9.4 - 12.4 fL Final    Neutrophils % 05/15/2023 38.9 (L)  53.0 - 65.0 % Final    Lymphocytes % 05/15/2023 45.2 (H)  27.0 - 41.0 % Final    Monocytes % 05/15/2023 5.7  2.0 - 6.0 % Final    Eosinophils % 05/15/2023 0.2 (L)  1.0 - 4.0 % Final    Basophils % 05/15/2023 0.2  0.0 - 1.0 % Final    Immature Granulocytes % 05/15/2023 9.8 (H)  0.0 - 0.4 % Final    nRBC, Auto 05/15/2023 0.0  <=0.0 % Final    Neutrophils, Abs 05/15/2023 1.71 (L)  1.80 - 7.70 K/uL Final    Lymphocytes, Absolute 05/15/2023 1.99  1.00 - 4.80 K/uL Final    Monocytes, Absolute 05/15/2023 0.25  0.00 - 0.80 K/uL Final    Eosinophils, Absolute 05/15/2023 0.01  0.00 - 0.50 K/uL Final    Basophils, Absolute 05/15/2023 0.01  0.00 - 0.20 K/uL Final    Immature Granulocytes, Absolute 05/15/2023 0.43 (H)  0.00 - 0.04 K/uL Final    nRBC, Absolute 05/15/2023 0.00  <=0.00 x10e3/uL Final    Diff Type 05/15/2023 Manual   Final    Segmented Neutrophils, Man % 05/15/2023 34 (L)  50 - 62 % Final     Lymphocytes, Man % 05/15/2023 62 (H)  27 - 41 % Final    Monocytes, Man % 05/15/2023 4  2 - 6 % Final    Platelet Morphology 05/15/2023 Normal  Normal Final    RBC Morphology 05/15/2023 Normal   Final   Office Visit on 04/10/2023   Component Date Value Ref Range Status    Color, UA 04/10/2023 Brown   Final    Spec Grav UA 04/10/2023 1.025   Final    pH, UA 04/10/2023 6.5   Final    WBC, UA 04/10/2023 sm   Final    Nitrite, UA 04/10/2023 neg   Final    Protein, POC 04/10/2023 30   Final    Glucose, UA 04/10/2023 neg   Final    Ketones, UA 04/10/2023 tr   Final    Bilirubin, POC 04/10/2023 sm   Final    Urobilinogen, UA 04/10/2023 1.0   Final    Blood, UA 04/10/2023 neg   Final    Sodium 04/10/2023 146 (H)  136 - 145 mmol/L Final    Potassium 04/10/2023 3.2 (L)  3.5 - 5.1 mmol/L Final    Chloride 04/10/2023 110 (H)  98 - 107 mmol/L Final    CO2 04/10/2023 31  21 - 32 mmol/L Final    Anion Gap 04/10/2023 8  7 - 16 mmol/L Final    Glucose 04/10/2023 94  74 - 106 mg/dL Final    BUN 04/10/2023 9  7 - 18 mg/dL Final    Creatinine 04/10/2023 0.73  0.55 - 1.02 mg/dL Final    BUN/Creatinine Ratio 04/10/2023 12  6 - 20 Final    Calcium 04/10/2023 8.4 (L)  8.5 - 10.1 mg/dL Final    eGFR 04/10/2023 90  >=60 mL/min/1.73m² Final    ProBNP 04/10/2023 330 (H)  1 - 125 pg/mL Final    Color, UA 04/10/2023 Yellow  Colorless, Straw, Yellow, Light Yellow, Dark Yellow Final    Clarity, UA 04/10/2023 Clear  Clear Final    pH, UA 04/10/2023 6.5  5.0 to 8.0 pH Units Final    Leukocytes, UA 04/10/2023 Moderate (A)  Negative Final    Nitrites, UA 04/10/2023 Negative  Negative Final    Protein, UA 04/10/2023 30 (A)  Negative Final    Glucose, UA 04/10/2023 Normal  Normal mg/dL Final    Ketones, UA 04/10/2023 Negative  Negative mg/dL Final    Urobilinogen, UA 04/10/2023 2 (A)  0.2, 1.0, Normal mg/dL Final    Bilirubin, UA 04/10/2023 Negative  Negative Final    Blood, UA 04/10/2023 Negative  Negative Final    Specific Gravity, UA 04/10/2023 1.029   <=1.030 Final    Culture, Urine 04/10/2023 Skin/Urogenital Cristina Isolated, no further workup.   Final    WBC, UA 04/10/2023 7 (H)  <=5 /hpf Final    RBC, UA 04/10/2023 4 (H)  <=3 /hpf Final    Bacteria, UA 04/10/2023 Few (A)  None Seen /hpf Final    Squamous Epithelial Cells, UA 04/10/2023 Occasional (A)  None Seen /HPF Final    Mucous 04/10/2023 Few (A)  None Seen /LPF Final   Office Visit on 03/29/2023   Component Date Value Ref Range Status    POC Amphetamines 03/29/2023 Negative  Negative, Inconclusive Final    POC Barbiturates 03/29/2023 Negative  Negative, Inconclusive Final    POC Benzodiazepines 03/29/2023 Negative  Negative, Inconclusive Final    POC Cocaine 03/29/2023 Negative  Negative, Inconclusive Final    POC THC 03/29/2023 Negative  Negative, Inconclusive Final    POC Methadone 03/29/2023 Negative  Negative, Inconclusive Final    POC Methamphetamine 03/29/2023 Negative  Negative, Inconclusive Final    POC Opiates 03/29/2023 Presumptive Positive (A)  Negative, Inconclusive Final    POC Oxycodone 03/29/2023 Negative  Negative, Inconclusive Final    POC Phencyclidine 03/29/2023 Negative  Negative, Inconclusive Final    POC Methylenedioxymethamphetamine * 03/29/2023 Negative  Negative, Inconclusive Final    POC Tricyclic Antidepressants 03/29/2023 Negative  Negative, Inconclusive Final    POC Buprenorphine 03/29/2023 Negative   Final     Acceptable 03/29/2023 Yes   Final    POC Temperature (Urine) 03/29/2023 90   Final         Orders Placed This Encounter   Procedures    POCT Urine Drug Screen Presump     Interpretive Information:     Negative:  No drug detected at the cut off level.   Positive:  This result represents presumptive positive for the   tested drug, other substances may yield a positive response other   than the analyte of interest. This result should be utilized for   diagnostic purpose only. Confirmation testing will be performed upon physician request only.           Requested Prescriptions     Signed Prescriptions Disp Refills    HYDROcodone-acetaminophen (NORCO) 7.5-325 mg per tablet 30 tablet 0     Sig: Take 1 tablet by mouth once daily.    HYDROcodone-acetaminophen (NORCO) 7.5-325 mg per tablet 30 tablet 0     Sig: Take 1 tablet by mouth once daily.    HYDROcodone-acetaminophen (NORCO) 7.5-325 mg per tablet 30 tablet 0     Sig: Take 1 tablet by mouth once daily.       Assessment:     1. Lumbosacral radiculopathy    2. Disorder of sacrum    3. Encounter for long-term (current) use of other medications         A's of Opioid Risk Assessment  Activity:Patient can perform ADL.   Analgesia:Patients pain is partially controlled by current medication. Patient has tried OTC medications such as Tylenol and Ibuprofen with out relief.   Adverse Effects: Patient denies constipation or sedation.  Aberrant Behavior:  reviewed with no aberrant drug seeking/taking behavior.  Overdose reversal drug naloxone discussed    Drug screen reviewed      Plan:    I last saw the patient December 2022     Patient had procedure April 2023  Bilateral lumbar L4 through S1 medial branch block # 1, April 13, 2023 she states she had 100% relief after procedure, the procedure did help improve her level of function    Definitive drug screen December 5, 2022 collected December 2, 2022 was negative     Patient states she will take her medication as directed on a daily basis     Her daughter is with her today she states she will monitor her mother's medication    She would like to continue current medication is helping control her discomfort    Continue home exercise program as directed    Follow-up 3 months    Dr. Mendez, April 2024    Bring original prescription medication bottles/container/box with labels to each visit

## 2023-06-19 ENCOUNTER — OFFICE VISIT (OUTPATIENT)
Dept: PAIN MEDICINE | Facility: CLINIC | Age: 68
End: 2023-06-19
Payer: COMMERCIAL

## 2023-06-19 VITALS
BODY MASS INDEX: 32.2 KG/M2 | DIASTOLIC BLOOD PRESSURE: 78 MMHG | HEIGHT: 62 IN | SYSTOLIC BLOOD PRESSURE: 146 MMHG | HEART RATE: 64 BPM | WEIGHT: 175 LBS | RESPIRATION RATE: 20 BRPM

## 2023-06-19 DIAGNOSIS — M53.3 DISORDER OF SACRUM: Chronic | ICD-10-CM

## 2023-06-19 DIAGNOSIS — Z79.899 ENCOUNTER FOR LONG-TERM (CURRENT) USE OF OTHER MEDICATIONS: ICD-10-CM

## 2023-06-19 DIAGNOSIS — M54.17 LUMBOSACRAL RADICULOPATHY: Primary | Chronic | ICD-10-CM

## 2023-06-19 PROCEDURE — 1125F PR PAIN SEVERITY QUANTIFIED, PAIN PRESENT: ICD-10-PCS | Mod: CPTII,,, | Performed by: PHYSICIAN ASSISTANT

## 2023-06-19 PROCEDURE — 3008F BODY MASS INDEX DOCD: CPT | Mod: CPTII,,, | Performed by: PHYSICIAN ASSISTANT

## 2023-06-19 PROCEDURE — 3077F SYST BP >= 140 MM HG: CPT | Mod: CPTII,,, | Performed by: PHYSICIAN ASSISTANT

## 2023-06-19 PROCEDURE — 1101F PR PT FALLS ASSESS DOC 0-1 FALLS W/OUT INJ PAST YR: ICD-10-PCS | Mod: CPTII,,, | Performed by: PHYSICIAN ASSISTANT

## 2023-06-19 PROCEDURE — 3288F FALL RISK ASSESSMENT DOCD: CPT | Mod: CPTII,,, | Performed by: PHYSICIAN ASSISTANT

## 2023-06-19 PROCEDURE — 99214 PR OFFICE/OUTPT VISIT, EST, LEVL IV, 30-39 MIN: ICD-10-PCS | Mod: S$PBB,,, | Performed by: PHYSICIAN ASSISTANT

## 2023-06-19 PROCEDURE — 3078F PR MOST RECENT DIASTOLIC BLOOD PRESSURE < 80 MM HG: ICD-10-PCS | Mod: CPTII,,, | Performed by: PHYSICIAN ASSISTANT

## 2023-06-19 PROCEDURE — 3078F DIAST BP <80 MM HG: CPT | Mod: CPTII,,, | Performed by: PHYSICIAN ASSISTANT

## 2023-06-19 PROCEDURE — 1159F PR MEDICATION LIST DOCUMENTED IN MEDICAL RECORD: ICD-10-PCS | Mod: CPTII,,, | Performed by: PHYSICIAN ASSISTANT

## 2023-06-19 PROCEDURE — 3288F PR FALLS RISK ASSESSMENT DOCUMENTED: ICD-10-PCS | Mod: CPTII,,, | Performed by: PHYSICIAN ASSISTANT

## 2023-06-19 PROCEDURE — 3077F PR MOST RECENT SYSTOLIC BLOOD PRESSURE >= 140 MM HG: ICD-10-PCS | Mod: CPTII,,, | Performed by: PHYSICIAN ASSISTANT

## 2023-06-19 PROCEDURE — 1125F AMNT PAIN NOTED PAIN PRSNT: CPT | Mod: CPTII,,, | Performed by: PHYSICIAN ASSISTANT

## 2023-06-19 PROCEDURE — 3008F PR BODY MASS INDEX (BMI) DOCUMENTED: ICD-10-PCS | Mod: CPTII,,, | Performed by: PHYSICIAN ASSISTANT

## 2023-06-19 PROCEDURE — 99214 OFFICE O/P EST MOD 30 MIN: CPT | Mod: S$PBB,,, | Performed by: PHYSICIAN ASSISTANT

## 2023-06-19 PROCEDURE — 80305 DRUG TEST PRSMV DIR OPT OBS: CPT | Mod: PBBFAC | Performed by: PHYSICIAN ASSISTANT

## 2023-06-19 PROCEDURE — 1159F MED LIST DOCD IN RCRD: CPT | Mod: CPTII,,, | Performed by: PHYSICIAN ASSISTANT

## 2023-06-19 PROCEDURE — 1101F PT FALLS ASSESS-DOCD LE1/YR: CPT | Mod: CPTII,,, | Performed by: PHYSICIAN ASSISTANT

## 2023-06-19 PROCEDURE — 99214 OFFICE O/P EST MOD 30 MIN: CPT | Mod: PBBFAC | Performed by: PHYSICIAN ASSISTANT

## 2023-06-19 RX ORDER — HYDROCODONE BITARTRATE AND ACETAMINOPHEN 7.5; 325 MG/1; MG/1
1 TABLET ORAL DAILY
Qty: 30 TABLET | Refills: 0 | Status: SHIPPED | OUTPATIENT
Start: 2023-08-20 | End: 2023-09-18

## 2023-06-19 RX ORDER — HYDROCODONE BITARTRATE AND ACETAMINOPHEN 7.5; 325 MG/1; MG/1
1 TABLET ORAL DAILY
Qty: 30 TABLET | Refills: 0 | Status: SHIPPED | OUTPATIENT
Start: 2023-06-21 | End: 2023-09-18

## 2023-06-19 RX ORDER — HYDROCODONE BITARTRATE AND ACETAMINOPHEN 7.5; 325 MG/1; MG/1
1 TABLET ORAL DAILY
Qty: 30 TABLET | Refills: 0 | Status: SHIPPED | OUTPATIENT
Start: 2023-07-21 | End: 2023-09-18

## 2023-07-05 ENCOUNTER — ANESTHESIA EVENT (OUTPATIENT)
Dept: GASTROENTEROLOGY | Facility: HOSPITAL | Age: 68
End: 2023-07-05
Payer: COMMERCIAL

## 2023-07-05 ENCOUNTER — ANESTHESIA (OUTPATIENT)
Dept: GASTROENTEROLOGY | Facility: HOSPITAL | Age: 68
End: 2023-07-05
Payer: COMMERCIAL

## 2023-07-05 ENCOUNTER — HOSPITAL ENCOUNTER (OUTPATIENT)
Dept: GASTROENTEROLOGY | Facility: HOSPITAL | Age: 68
Discharge: HOME | End: 2023-07-05
Attending: SURGERY | Admitting: SURGERY
Payer: COMMERCIAL

## 2023-07-05 VITALS
RESPIRATION RATE: 16 BRPM | TEMPERATURE: 98 F | SYSTOLIC BLOOD PRESSURE: 127 MMHG | DIASTOLIC BLOOD PRESSURE: 74 MMHG | HEART RATE: 54 BPM | OXYGEN SATURATION: 99 %

## 2023-07-05 DIAGNOSIS — Z12.11 ENCOUNTER FOR SCREENING COLONOSCOPY: Primary | ICD-10-CM

## 2023-07-05 DIAGNOSIS — Z12.11 SCREENING FOR MALIGNANT NEOPLASM OF COLON: ICD-10-CM

## 2023-07-05 DIAGNOSIS — Z12.11 ENCOUNTER FOR SCREENING COLONOSCOPY: ICD-10-CM

## 2023-07-05 PROCEDURE — 63600175 PHARM REV CODE 636 W HCPCS: Performed by: NURSE ANESTHETIST, CERTIFIED REGISTERED

## 2023-07-05 PROCEDURE — 25000003 PHARM REV CODE 250: Performed by: NURSE ANESTHETIST, CERTIFIED REGISTERED

## 2023-07-05 PROCEDURE — 45385 COLONOSCOPY W/LESION REMOVAL: CPT | Mod: PT | Performed by: SURGERY

## 2023-07-05 PROCEDURE — 88305 TISSUE EXAM BY PATHOLOGIST: CPT | Mod: TC,SUR | Performed by: SURGERY

## 2023-07-05 PROCEDURE — 37000009 HC ANESTHESIA EA ADD 15 MINS

## 2023-07-05 PROCEDURE — 88341 IMHCHEM/IMCYTCHM EA ADD ANTB: CPT | Mod: 26,,, | Performed by: PATHOLOGY

## 2023-07-05 PROCEDURE — 37000008 HC ANESTHESIA 1ST 15 MINUTES

## 2023-07-05 PROCEDURE — 88305 SURGICAL PATHOLOGY: ICD-10-PCS | Mod: 26,,, | Performed by: PATHOLOGY

## 2023-07-05 PROCEDURE — 88305 TISSUE EXAM BY PATHOLOGIST: CPT | Mod: 26,,, | Performed by: PATHOLOGY

## 2023-07-05 PROCEDURE — 88342 IMHCHEM/IMCYTCHM 1ST ANTB: CPT | Mod: 26,,, | Performed by: PATHOLOGY

## 2023-07-05 PROCEDURE — 88341 SURGICAL PATHOLOGY: ICD-10-PCS | Mod: 26,,, | Performed by: PATHOLOGY

## 2023-07-05 PROCEDURE — 27000284 HC CANNULA NASAL: Performed by: NURSE ANESTHETIST, CERTIFIED REGISTERED

## 2023-07-05 PROCEDURE — D9220A PRA ANESTHESIA: ICD-10-PCS | Mod: PT,,, | Performed by: NURSE ANESTHETIST, CERTIFIED REGISTERED

## 2023-07-05 PROCEDURE — 88342 SURGICAL PATHOLOGY: ICD-10-PCS | Mod: 26,,, | Performed by: PATHOLOGY

## 2023-07-05 PROCEDURE — D9220A PRA ANESTHESIA: Mod: PT,,, | Performed by: NURSE ANESTHETIST, CERTIFIED REGISTERED

## 2023-07-05 PROCEDURE — 27201423 OPTIME MED/SURG SUP & DEVICES STERILE SUPPLY

## 2023-07-05 RX ORDER — SODIUM CHLORIDE, SODIUM LACTATE, POTASSIUM CHLORIDE, CALCIUM CHLORIDE 600; 310; 30; 20 MG/100ML; MG/100ML; MG/100ML; MG/100ML
INJECTION, SOLUTION INTRAVENOUS CONTINUOUS
Status: DISCONTINUED | OUTPATIENT
Start: 2023-07-05 | End: 2023-07-06 | Stop reason: HOSPADM

## 2023-07-05 RX ORDER — PROPOFOL 10 MG/ML
VIAL (ML) INTRAVENOUS
Status: DISCONTINUED | OUTPATIENT
Start: 2023-07-05 | End: 2023-07-05

## 2023-07-05 RX ORDER — LIDOCAINE HYDROCHLORIDE 20 MG/ML
INJECTION INTRAVENOUS
Status: DISCONTINUED | OUTPATIENT
Start: 2023-07-05 | End: 2023-07-05

## 2023-07-05 RX ORDER — SODIUM CHLORIDE, SODIUM LACTATE, POTASSIUM CHLORIDE, CALCIUM CHLORIDE 600; 310; 30; 20 MG/100ML; MG/100ML; MG/100ML; MG/100ML
INJECTION, SOLUTION INTRAVENOUS CONTINUOUS
Status: CANCELLED | OUTPATIENT
Start: 2023-07-05

## 2023-07-05 RX ADMIN — LIDOCAINE HYDROCHLORIDE 50 MG: 20 INJECTION, SOLUTION INTRAVENOUS at 09:07

## 2023-07-05 RX ADMIN — PROPOFOL 50 MG: 10 INJECTION, EMULSION INTRAVENOUS at 09:07

## 2023-07-05 RX ADMIN — SODIUM CHLORIDE: 9 INJECTION, SOLUTION INTRAVENOUS at 09:07

## 2023-07-05 NOTE — TRANSFER OF CARE
Anesthesia Transfer of Care Note    Patient: Xavier Rodriguez    Procedure(s) Performed: * No procedures listed *    Patient location: GI    Anesthesia Type: general    Transport from OR: Transported from OR on room air with adequate spontaneous ventilation. Continuous SpO2 monitoring in transport    Post pain: adequate analgesia    Post assessment: no apparent anesthetic complications and tolerated procedure well    Post vital signs: stable    Level of consciousness: responds to stimulation and awake    Nausea/Vomiting: no nausea/vomiting    Complications: none    Transfer of care protocol was followed      Last vitals:   Visit Vitals  BP (!) 87/60 (BP Location: Left arm, Patient Position: Lying)   Pulse 98   Temp 36.7 °C (98 °F) (Skin)   Resp 14   SpO2 98%   Breastfeeding No

## 2023-07-05 NOTE — DISCHARGE SUMMARY
[facility] - Endoscopy  Discharge Note  Short Stay    Colonoscopy      OUTCOME: Patient tolerated treatment/procedure well without complication and is now ready for discharge.    DISPOSITION: Home or Self Care    FINAL DIAGNOSIS:  Screening for colon malgiancny    FOLLOWUP: With primary care provider    DISCHARGE INSTRUCTIONS:  No discharge procedures on file.     TIME SPENT ON DISCHARGE: 5 minutes

## 2023-07-05 NOTE — ANESTHESIA PREPROCEDURE EVALUATION
07/05/2023  Xavier Rodriguez is a 67 y.o., female.      Pre-op Assessment    I have reviewed the Patient Summary Reports.     I have reviewed the Nursing Notes. I have reviewed the NPO Status.   I have reviewed the Medications.     Review of Systems  Anesthesia Hx:  No problems with previous Anesthesia    Hematology/Oncology:  Hematology Normal   Oncology Normal     Cardiovascular:   Hypertension    Pulmonary:  Pulmonary Normal    Renal/:  Renal/ Normal     Hepatic/GI:  Hepatic/GI Normal    Musculoskeletal:   Arthritis     Neurological:   Neuromuscular Disease,    Endocrine:  Endocrine Normal    Psych:   Psychiatric History        Past Medical History:   Diagnosis Date    Chronic low back pain     Depression     Dysfunctional voiding of urine 11/10/2021    Patient states she does not urinate.  She was taken off of lasix which was the only thing that helped her empty. PVR 0 ml Chronic constipation, lifelong.  Has been out of Linzess for a month now. 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    INJECTION OF ANESTHETIC AGENT AROUND MEDIAL BRANCH NERVES INNERVATING LUMBAR FACET JOINT Bilateral 4/13/2023    Procedure: Bilateral L4-5,5-S1 MBB;  Surgeon: Ilsa Mendez MD;  Location: [facility];  Service: Pain Management;  Laterality: Bilateral;    TUMOR REMOVAL             Social History     Socioeconomic History    Marital status: Single   Tobacco Use    Smoking status: Former     Packs/day: 0.00     Years: 0.00     Pack years: 0.00     Types: Cigarettes     Passive exposure: Past    Smokeless tobacco: Never    Tobacco comments:     Stop smoking about 15 years ago   Substance and Sexual Activity     Alcohol use: Never    Drug use: Never    Sexual activity: Not Currently     Partners: Male     Birth control/protection: None       Current Outpatient Medications   Medication Sig Dispense Refill    amitriptyline (ELAVIL) 25 MG tablet Take 25 mg by mouth.      furosemide (LASIX) 20 MG tablet Take 1 tablet (20 mg total) by mouth daily as needed. 30 tablet 0    HYDROcodone-acetaminophen (NORCO) 7.5-325 mg per tablet Take 1 tablet by mouth once daily. 30 tablet 0    HYDROcodone-acetaminophen (NORCO) 7.5-325 mg per tablet Take 1 tablet by mouth once daily. 30 tablet 0    HYDROcodone-acetaminophen (NORCO) 7.5-325 mg per tablet Take 1 tablet by mouth once daily. 30 tablet 0    [START ON 7/21/2023] HYDROcodone-acetaminophen (NORCO) 7.5-325 mg per tablet Take 1 tablet by mouth once daily. 30 tablet 0    [START ON 8/20/2023] HYDROcodone-acetaminophen (NORCO) 7.5-325 mg per tablet Take 1 tablet by mouth once daily. 30 tablet 0    LINZESS 290 mcg Cap capsule TAKE 1 CAPSULE (290 MCG TOTAL) BY MOUTH DAILY AS NEEDED. 30 capsule 2    lovastatin (MEVACOR) 20 MG tablet TAKE 1 TABLET BY MOUTH EVERY DAY IN THE EVENING 90 tablet 1    naloxegoL (MOVANTIK) 25 mg tablet Take 25 mg by mouth once daily. 90 tablet 1    naproxen (NAPROSYN) 500 MG tablet Take 1 tablet (500 mg total) by mouth daily as needed. 30 tablet 1    potassium chloride SA (K-DUR,KLOR-CON) 20 MEQ tablet Take 1 tablet (20 mEq total) by mouth once daily. 90 tablet 1    tamsulosin (FLOMAX) 0.4 mg Cap Take 1 capsule (0.4 mg total) by mouth once daily. 90 capsule 1    topiramate 25 mg capsule Take 25 mg by mouth.       No current facility-administered medications for this encounter.       Review of patient's allergies indicates:  No Known Allergies    Physical Exam  General: Well nourished, Cooperative and Alert    Airway:  Mallampati: II   Mouth Opening: Normal  TM Distance: Normal  Neck ROM: Normal ROM    Dental:  Intact    Chest/Lungs:  Clear to  auscultation    Heart:  Rate: Normal  Rhythm: Regular Rhythm        Anesthesia Plan  Type of Anesthesia, risks & benefits discussed:    Anesthesia Type: Gen Natural Airway  Intra-op Monitoring Plan: Standard ASA Monitors  Post Op Pain Control Plan: multimodal analgesia  Induction:  IV  Informed Consent: Informed consent signed with the Patient and all parties understand the risks and agree with anesthesia plan.  All questions answered. Patient consented to blood products? Yes  ASA Score: 3  Day of Surgery Review of History & Physical: H&P Update referred to the surgeon/provider.    Ready For Surgery From Anesthesia Perspective.     .

## 2023-07-05 NOTE — ANESTHESIA POSTPROCEDURE EVALUATION
Anesthesia Post Evaluation    Patient: Xavier Rodriguez    Procedure(s) Performed: * No procedures listed *    Final Anesthesia Type: general      Patient location during evaluation: GI PACU  Patient participation: Yes- Able to Participate  Level of consciousness: awake and alert  Post-procedure vital signs: reviewed and stable  Pain management: adequate  Airway patency: patent    PONV status at discharge: No PONV  Anesthetic complications: no      Cardiovascular status: stable  Respiratory status: unassisted, spontaneous ventilation and room air  Hydration status: euvolemic  Follow-up not needed.          Vitals Value Taken Time   /74 07/05/23 1030   Temp 36.7 °C (98 °F) 07/05/23 1002   Pulse 54 07/05/23 1030   Resp 16 07/05/23 1030   SpO2 99 % 07/05/23 1030         Event Time   Out of Recovery 10:30:00         Pain/Capri Score: Capri Score: 10 (7/5/2023 10:15 AM)

## 2023-07-05 NOTE — H&P
[facility] - Endoscopy  History & Physical    Subjective:      Chief Complaint/Reason for Admission: Patient is a 66 yo female who is here for evaluation for a colonoscopy.  She has never had a colonoscopy.  They deny any abdominal pain, nausea, weight loss, constipation, nor any blood in stool.  They deny any family history of colon cancer.      Xavier Rodriguez is a 67 y.o. female.    Past Medical History:   Diagnosis Date    Chronic low back pain     Depression     Dysfunctional voiding of urine 11/10/2021    Patient states she does not urinate.  She was taken off of lasix which was the only thing that helped her empty. PVR 0 ml Chronic constipation, lifelong.  Has been out of Linzess for a month now. 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    INJECTION OF ANESTHETIC AGENT AROUND MEDIAL BRANCH NERVES INNERVATING LUMBAR FACET JOINT Bilateral 4/13/2023    Procedure: Bilateral L4-5,5-S1 MBB;  Surgeon: Ilsa Mendez MD;  Location: [facility];  Service: Pain Management;  Laterality: Bilateral;    TUMOR REMOVAL       Family History   Problem Relation Age of Onset    Heart disease Mother     Heart attack Mother     Heart disease Father         Heart attack    Heart attack Father      Social History     Tobacco Use    Smoking status: Former     Packs/day: 0.00     Years: 0.00     Pack years: 0.00     Types: Cigarettes     Passive exposure: Past    Smokeless tobacco: Never    Tobacco comments:     Stop smoking about 15 years ago   Substance Use Topics    Alcohol use: Never    Drug use: Never       (Not in a hospital admission)    Review of patient's allergies indicates:  No Known Allergies     Review of Systems   Constitutional:  Negative for fever and weight loss.   HENT:  Negative for hearing loss.     Eyes:  Negative for blurred vision.   Respiratory:  Negative for cough.    Cardiovascular:  Negative for chest pain.   Gastrointestinal:  Negative for abdominal pain, blood in stool, constipation, diarrhea, nausea and vomiting.   Genitourinary:  Negative for dysuria.   Skin:  Negative for rash.   Neurological:  Negative for dizziness and tremors.   Psychiatric/Behavioral:  Negative for hallucinations and suicidal ideas.      Objective:      Vital Signs (Most Recent)  Pulse: 60 (07/05/23 0921)  Resp: 18 (07/05/23 0921)  BP: 135/71 (07/05/23 0921)  SpO2: 97 % (07/05/23 0921)    Vital Signs Range (Last 24H):  Pulse:  [60]   Resp:  [18]   BP: (135)/(71)   SpO2:  [97 %]     Physical Exam  Constitutional:       General: She is not in acute distress.  HENT:      Head: Normocephalic.   Cardiovascular:      Rate and Rhythm: Normal rate and regular rhythm.      Pulses: Normal pulses.   Pulmonary:      Effort: Pulmonary effort is normal. No respiratory distress.      Breath sounds: Normal breath sounds.   Abdominal:      General: Abdomen is flat. There is no distension.      Palpations: Abdomen is soft.      Tenderness: There is no abdominal tenderness.   Musculoskeletal:         General: Normal range of motion.   Skin:     General: Skin is warm.   Neurological:      General: No focal deficit present.      Mental Status: She is oriented to person, place, and time.       Data Review:  CBC:   Lab Results   Component Value Date    WBC 4.40 (L) 05/15/2023    RBC 4.31 05/15/2023    HGB 12.0 05/15/2023    HCT 39.0 05/15/2023     05/15/2023     BMP:   Lab Results   Component Value Date    GLU 83 05/15/2023     05/15/2023    K 3.8 05/15/2023     05/15/2023    CO2 27 05/15/2023    BUN 9 05/15/2023    CREATININE 0.87 05/15/2023    CALCIUM 8.6 05/15/2023      ECG: no prior ECG.     Assessment:      There are no hospital problems to display for this patient.  Requires Screening colonoscopy    Plan:    Patient to go to  the operating room for a colonoscopy. Risks and benefits explained to the patient including risk of bleeding, infection, missed lesion, perforation, and possible need for additional operation. All questions were answered.

## 2023-07-07 LAB
DHEA SERPL-MCNC: NORMAL
ESTROGEN SERPL-MCNC: NORMAL PG/ML
INSULIN SERPL-ACNC: NORMAL U[IU]/ML
LAB AP GROSS DESCRIPTION: NORMAL
LAB AP LABORATORY NOTES: NORMAL
T3RU NFR SERPL: NORMAL %

## 2023-07-09 DIAGNOSIS — G89.4 CHRONIC PAIN SYNDROME: ICD-10-CM

## 2023-07-10 RX ORDER — NAPROXEN 500 MG/1
TABLET ORAL
Qty: 30 TABLET | Refills: 1 | Status: SHIPPED | OUTPATIENT
Start: 2023-07-10 | End: 2023-09-22

## 2023-08-15 ENCOUNTER — OFFICE VISIT (OUTPATIENT)
Dept: FAMILY MEDICINE | Facility: CLINIC | Age: 68
End: 2023-08-15
Payer: COMMERCIAL

## 2023-08-15 VITALS
BODY MASS INDEX: 31.77 KG/M2 | SYSTOLIC BLOOD PRESSURE: 103 MMHG | RESPIRATION RATE: 18 BRPM | DIASTOLIC BLOOD PRESSURE: 66 MMHG | OXYGEN SATURATION: 96 % | WEIGHT: 172.63 LBS | HEART RATE: 74 BPM | HEIGHT: 62 IN | TEMPERATURE: 98 F

## 2023-08-15 DIAGNOSIS — R60.9 RETENTION OF FLUID: ICD-10-CM

## 2023-08-15 DIAGNOSIS — E87.6 HYPOKALEMIA: Primary | ICD-10-CM

## 2023-08-15 LAB
ANION GAP SERPL CALCULATED.3IONS-SCNC: 10 MMOL/L (ref 7–16)
BUN SERPL-MCNC: 10 MG/DL (ref 7–18)
BUN/CREAT SERPL: 10 (ref 6–20)
CALCIUM SERPL-MCNC: 8.9 MG/DL (ref 8.5–10.1)
CHLORIDE SERPL-SCNC: 107 MMOL/L (ref 98–107)
CO2 SERPL-SCNC: 27 MMOL/L (ref 21–32)
CREAT SERPL-MCNC: 1 MG/DL (ref 0.55–1.02)
EGFR (NO RACE VARIABLE) (RUSH/TITUS): 61 ML/MIN/1.73M2
GLUCOSE SERPL-MCNC: 84 MG/DL (ref 74–106)
POTASSIUM SERPL-SCNC: 3.7 MMOL/L (ref 3.5–5.1)
SODIUM SERPL-SCNC: 140 MMOL/L (ref 136–145)

## 2023-08-15 PROCEDURE — 99213 PR OFFICE/OUTPT VISIT, EST, LEVL III, 20-29 MIN: ICD-10-PCS | Mod: ,,, | Performed by: NURSE PRACTITIONER

## 2023-08-15 PROCEDURE — 1159F MED LIST DOCD IN RCRD: CPT | Mod: ,,, | Performed by: NURSE PRACTITIONER

## 2023-08-15 PROCEDURE — 1101F PR PT FALLS ASSESS DOC 0-1 FALLS W/OUT INJ PAST YR: ICD-10-PCS | Mod: ,,, | Performed by: NURSE PRACTITIONER

## 2023-08-15 PROCEDURE — 3078F PR MOST RECENT DIASTOLIC BLOOD PRESSURE < 80 MM HG: ICD-10-PCS | Mod: ,,, | Performed by: NURSE PRACTITIONER

## 2023-08-15 PROCEDURE — 99213 OFFICE O/P EST LOW 20 MIN: CPT | Mod: ,,, | Performed by: NURSE PRACTITIONER

## 2023-08-15 PROCEDURE — 3078F DIAST BP <80 MM HG: CPT | Mod: ,,, | Performed by: NURSE PRACTITIONER

## 2023-08-15 PROCEDURE — 3074F PR MOST RECENT SYSTOLIC BLOOD PRESSURE < 130 MM HG: ICD-10-PCS | Mod: ,,, | Performed by: NURSE PRACTITIONER

## 2023-08-15 PROCEDURE — 3008F BODY MASS INDEX DOCD: CPT | Mod: ,,, | Performed by: NURSE PRACTITIONER

## 2023-08-15 PROCEDURE — 1160F PR REVIEW ALL MEDS BY PRESCRIBER/CLIN PHARMACIST DOCUMENTED: ICD-10-PCS | Mod: ,,, | Performed by: NURSE PRACTITIONER

## 2023-08-15 PROCEDURE — 3074F SYST BP LT 130 MM HG: CPT | Mod: ,,, | Performed by: NURSE PRACTITIONER

## 2023-08-15 PROCEDURE — 80048 BASIC METABOLIC PNL TOTAL CA: CPT | Mod: ,,, | Performed by: CLINICAL MEDICAL LABORATORY

## 2023-08-15 PROCEDURE — 3288F PR FALLS RISK ASSESSMENT DOCUMENTED: ICD-10-PCS | Mod: ,,, | Performed by: NURSE PRACTITIONER

## 2023-08-15 PROCEDURE — 1125F PR PAIN SEVERITY QUANTIFIED, PAIN PRESENT: ICD-10-PCS | Mod: ,,, | Performed by: NURSE PRACTITIONER

## 2023-08-15 PROCEDURE — 1159F PR MEDICATION LIST DOCUMENTED IN MEDICAL RECORD: ICD-10-PCS | Mod: ,,, | Performed by: NURSE PRACTITIONER

## 2023-08-15 PROCEDURE — 1160F RVW MEDS BY RX/DR IN RCRD: CPT | Mod: ,,, | Performed by: NURSE PRACTITIONER

## 2023-08-15 PROCEDURE — 3288F FALL RISK ASSESSMENT DOCD: CPT | Mod: ,,, | Performed by: NURSE PRACTITIONER

## 2023-08-15 PROCEDURE — 80048 BASIC METABOLIC PANEL: ICD-10-PCS | Mod: ,,, | Performed by: CLINICAL MEDICAL LABORATORY

## 2023-08-15 PROCEDURE — 3008F PR BODY MASS INDEX (BMI) DOCUMENTED: ICD-10-PCS | Mod: ,,, | Performed by: NURSE PRACTITIONER

## 2023-08-15 PROCEDURE — 1101F PT FALLS ASSESS-DOCD LE1/YR: CPT | Mod: ,,, | Performed by: NURSE PRACTITIONER

## 2023-08-15 PROCEDURE — 1125F AMNT PAIN NOTED PAIN PRSNT: CPT | Mod: ,,, | Performed by: NURSE PRACTITIONER

## 2023-08-15 RX ORDER — POTASSIUM CHLORIDE 20 MEQ/1
20 TABLET, EXTENDED RELEASE ORAL DAILY
Qty: 90 TABLET | Refills: 1 | Status: SHIPPED | OUTPATIENT
Start: 2023-08-15

## 2023-08-15 RX ORDER — FUROSEMIDE 20 MG/1
20 TABLET ORAL DAILY PRN
Qty: 30 TABLET | Refills: 0 | Status: CANCELLED | OUTPATIENT
Start: 2023-08-15 | End: 2024-08-14

## 2023-08-15 RX ORDER — FUROSEMIDE 20 MG/1
20 TABLET ORAL DAILY PRN
Qty: 30 TABLET | Refills: 0 | Status: SHIPPED | OUTPATIENT
Start: 2023-08-15 | End: 2023-12-12

## 2023-08-15 NOTE — PROGRESS NOTES
Clinic Note    Xavier Rodriguez is a 67 y.o. female     Chief Complaint:   Chief Complaint   Patient presents with    Follow-up     3 month     Medication Refill    Back Pain        Subjective:    Patient comes in today for 3 month follow up and medication refill. Patient is disoriented and poor historian.   Patient complains of urinary retention. States lasix helps her void. Denies dysuria.  Otherwise patient without complaint or concern.  MA spoke with daughter to ensure refills needed    Follow-up  Pertinent negatives include no abdominal pain, chest pain, coughing or fever.   Medication Refill  Pertinent negatives include no abdominal pain, chest pain, coughing or fever.   Back Pain  Pertinent negatives include no abdominal pain, chest pain, dysuria or fever.        Allergies:   Review of patient's allergies indicates:  No Known Allergies     Past Medical History:  Past Medical History:   Diagnosis Date    Chronic low back pain     Depression     Dysfunctional voiding of urine 11/10/2021    Patient states she does not urinate.  She was taken off of lasix which was the only thing that helped her empty. PVR 0 ml Chronic constipation, lifelong.  Has been out of Linzess for a month now. Renal function WNL when last checked in May    Edema     Hyperlipidemia     Hypertension     IBS (irritable bowel syndrome)         Current Medications:    Current Outpatient Medications:     amitriptyline (ELAVIL) 25 MG tablet, Take 25 mg by mouth., Disp: , Rfl:     [START ON 8/20/2023] HYDROcodone-acetaminophen (NORCO) 7.5-325 mg per tablet, Take 1 tablet by mouth once daily., Disp: 30 tablet, Rfl: 0    LINZESS 290 mcg Cap capsule, TAKE 1 CAPSULE (290 MCG TOTAL) BY MOUTH DAILY AS NEEDED., Disp: 30 capsule, Rfl: 2    lovastatin (MEVACOR) 20 MG tablet, TAKE 1 TABLET BY MOUTH EVERY DAY IN THE EVENING, Disp: 90 tablet, Rfl: 1    naloxegoL (MOVANTIK) 25 mg tablet, Take 25 mg by mouth once daily., Disp: 90 tablet, Rfl: 1    naproxen  "(NAPROSYN) 500 MG tablet, TAKE 1 TABLET BY MOUTH DAILY AS NEEDED., Disp: 30 tablet, Rfl: 1    tamsulosin (FLOMAX) 0.4 mg Cap, Take 1 capsule (0.4 mg total) by mouth once daily., Disp: 90 capsule, Rfl: 1    topiramate 25 mg capsule, Take 25 mg by mouth., Disp: , Rfl:     furosemide (LASIX) 20 MG tablet, Take 1 tablet (20 mg total) by mouth daily as needed., Disp: 30 tablet, Rfl: 0    HYDROcodone-acetaminophen (NORCO) 7.5-325 mg per tablet, Take 1 tablet by mouth once daily. (Patient not taking: Reported on 8/15/2023), Disp: 30 tablet, Rfl: 0    HYDROcodone-acetaminophen (NORCO) 7.5-325 mg per tablet, Take 1 tablet by mouth once daily. (Patient not taking: Reported on 8/15/2023), Disp: 30 tablet, Rfl: 0    HYDROcodone-acetaminophen (NORCO) 7.5-325 mg per tablet, Take 1 tablet by mouth once daily. (Patient not taking: Reported on 8/15/2023), Disp: 30 tablet, Rfl: 0    HYDROcodone-acetaminophen (NORCO) 7.5-325 mg per tablet, Take 1 tablet by mouth once daily. (Patient not taking: Reported on 8/15/2023), Disp: 30 tablet, Rfl: 0    potassium chloride SA (K-DUR,KLOR-CON) 20 MEQ tablet, Take 1 tablet (20 mEq total) by mouth once daily., Disp: 90 tablet, Rfl: 1       Review of Systems   Constitutional:  Negative for fever.   Respiratory:  Negative for cough and shortness of breath.    Cardiovascular:  Negative for chest pain.   Gastrointestinal:  Negative for abdominal pain.   Genitourinary:  Positive for difficulty urinating. Negative for dysuria and frequency.   Musculoskeletal:  Positive for back pain.          Objective:    /66 (BP Location: Left arm, Patient Position: Sitting, BP Method: Large (Automatic))   Pulse 74   Temp 98.4 °F (36.9 °C) (Oral)   Resp 18   Ht 5' 2" (1.575 m)   Wt 78.3 kg (172 lb 9.6 oz)   SpO2 96%   BMI 31.57 kg/m²      Physical Exam  Constitutional:       Appearance: Normal appearance.   Eyes:      Extraocular Movements: Extraocular movements intact.   Cardiovascular:      Rate and " Rhythm: Normal rate and regular rhythm.      Pulses: Normal pulses.      Heart sounds: Normal heart sounds.   Pulmonary:      Effort: Pulmonary effort is normal.      Breath sounds: Normal breath sounds.   Abdominal:      Palpations: Abdomen is soft.      Tenderness: There is no abdominal tenderness. There is no guarding or rebound.   Musculoskeletal:      Right lower leg: No edema.      Left lower leg: No edema.      Comments: cane   Skin:     General: Skin is warm and dry.   Neurological:      Mental Status: She is alert. She is disoriented.      Gait: Gait abnormal.          Assessment and Plan:    1. Hypokalemia    2. Retention of fluid         Hypokalemia  -     potassium chloride SA (K-DUR,KLOR-CON) 20 MEQ tablet; Take 1 tablet (20 mEq total) by mouth once daily.  Dispense: 90 tablet; Refill: 1  -     Basic Metabolic Panel; Future; Expected date: 08/15/2023    Retention of fluid  -     furosemide (LASIX) 20 MG tablet; Take 1 tablet (20 mg total) by mouth daily as needed.  Dispense: 30 tablet; Refill: 0  -spoke with daughter joleen. Joleen only given lasix prn (once weekly)  -has not noticed change with adding flomax, but daughter states she is not with patient daily. However, patient continued to complain of urinary retention if she does not have lasix  -patient has appointment with urology on 8-27-23, Dr. Grajeda        There are no Patient Instructions on file for this visit.   Follow up in about 4 months (around 12/15/2023).

## 2023-08-16 ENCOUNTER — TELEPHONE (OUTPATIENT)
Dept: FAMILY MEDICINE | Facility: CLINIC | Age: 68
End: 2023-08-16
Payer: COMMERCIAL

## 2023-08-16 NOTE — TELEPHONE ENCOUNTER
Contacted patient in regards to lab results. Patient daughter was available. Informed patient daughter that patient lab results were stable and require no change in treatment. Patient daughter voiced understanding and had no further questions.     ----- Message from TEENA Ross sent at 8/16/2023  8:12 AM CDT -----  Labs stable

## 2023-09-22 DIAGNOSIS — G89.4 CHRONIC PAIN SYNDROME: ICD-10-CM

## 2023-09-22 RX ORDER — NAPROXEN 500 MG/1
500 TABLET ORAL DAILY PRN
Qty: 30 TABLET | Refills: 1 | Status: SHIPPED | OUTPATIENT
Start: 2023-09-22 | End: 2023-11-28

## 2023-10-09 DIAGNOSIS — Z71.89 COMPLEX CARE COORDINATION: ICD-10-CM

## 2023-10-23 NOTE — PROGRESS NOTES
Subjective:         Patient ID: Xavier Rodriguez is a 67 y.o. female.    Chief Complaint: Abdominal Pain        Pain  This is a chronic problem. The current episode started more than 1 year ago. The problem occurs daily. The problem has been unchanged. Associated symptoms include arthralgias, headaches and neck pain. Pertinent negatives include no anorexia, change in bowel habit, chest pain, chills, fever, sore throat, swollen glands, urinary symptoms or vertigo.     Review of Systems   Constitutional:  Negative for activity change, appetite change, chills, fever and unexpected weight change.   HENT:  Negative for drooling, ear discharge, facial swelling, mouth dryness, nosebleeds, sore throat, trouble swallowing, voice change and goiter.    Eyes:  Negative for discharge and visual disturbance.   Respiratory:  Negative for apnea, choking, chest tightness, shortness of breath, wheezing and stridor.    Cardiovascular:  Negative for chest pain, palpitations and leg swelling.   Gastrointestinal:  Negative for abdominal distention, anorexia, change in bowel habit, diarrhea, rectal pain and fecal incontinence.   Endocrine: Negative for cold intolerance, heat intolerance, polydipsia, polyphagia and polyuria.   Genitourinary:  Negative for flank pain, frequency and hot flashes.   Musculoskeletal:  Positive for arthralgias, leg pain and neck pain.   Integumentary:  Negative for color change and pallor.   Allergic/Immunologic: Negative for immunocompromised state.   Neurological:  Positive for headaches. Negative for vertigo, syncope, facial asymmetry, speech difficulty, light-headedness, memory loss and coordination difficulties.   Hematological:  Negative for adenopathy. Does not bruise/bleed easily.   Psychiatric/Behavioral:  Negative for agitation, behavioral problems, confusion, decreased concentration, dysphoric mood, hallucinations, self-injury and suicidal ideas. The patient is not nervous/anxious and is not  "hyperactive.            Past Medical History:   Diagnosis Date    Chronic low back pain     Depression     Dysfunctional voiding of urine 11/10/2021    Patient states she does not urinate.  She was taken off of lasix which was the only thing that helped her empty. PVR 0 ml Chronic constipation, lifelong.  Has been out of Linzess for a month now. 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;  Service: Pain Management;  Laterality: Bilateral;    TUMOR REMOVAL       Social History     Socioeconomic History    Marital status: Single   Tobacco Use    Smoking status: Former     Current packs/day: 0.00     Types: Cigarettes     Passive exposure: Past    Smokeless tobacco: Never    Tobacco comments:     Stop smoking about 15 years ago   Substance and Sexual Activity    Alcohol use: Never    Drug use: Yes     Types: Hydrocodone    Sexual activity: Not Currently     Partners: Male     Birth control/protection: None     Family History   Problem Relation Age of Onset    Heart disease Mother     Heart attack Mother     Heart disease Father         Heart attack    Heart attack Father      Review of patient's allergies indicates:  No Known Allergies     Objective:  Vitals:    10/25/23 1017   BP: 135/71   Pulse: (!) 51   Resp: 20   Weight: 79.8 kg (176 lb)   Height: 5' 2" (1.575 m)   PainSc:   8         Physical Exam  Vitals and nursing note reviewed. Exam conducted with a chaperone present.   Constitutional:       General: She is awake. She is not in acute distress.     Appearance: Normal appearance. She is not ill-appearing, " toxic-appearing or diaphoretic.   HENT:      Head: Normocephalic and atraumatic.      Nose: Nose normal.      Mouth/Throat:      Mouth: Mucous membranes are moist.      Pharynx: Oropharynx is clear.   Eyes:      Conjunctiva/sclera: Conjunctivae normal.      Pupils: Pupils are equal, round, and reactive to light.   Cardiovascular:      Rate and Rhythm: Normal rate.   Pulmonary:      Effort: Pulmonary effort is normal. No respiratory distress.   Abdominal:      Palpations: Abdomen is soft.      Tenderness: There is no guarding.   Musculoskeletal:         General: Normal range of motion.      Cervical back: Normal range of motion and neck supple. No rigidity.      Thoracic back: Tenderness present.      Lumbar back: Tenderness present.   Skin:     General: Skin is warm and dry.      Coloration: Skin is not jaundiced or pale.   Neurological:      General: No focal deficit present.      Mental Status: She is alert and oriented to person, place, and time. Mental status is at baseline.      Cranial Nerves: No cranial nerve deficit (II-XII).   Psychiatric:         Mood and Affect: Mood normal.         Behavior: Behavior normal. Behavior is cooperative.         Thought Content: Thought content normal.           Colonoscopy  Narrative: Table formatting from the original result was not included.  Procedure Date  7/5/23    Impression  Overall   Impression:  Poor prep with thick pools and retained stools all   along the walls.  Rectosigmoid area was bad and descending colon improved   however transverse colon was again noted to be bad.  5 mm polyp seen in   the rectum removed with a snare.  Will schedule a repeat colonoscopy    Recommendation   Schedule repeat colonoscopy     Indication  Encounter for screening colonoscopy    Providers  Sebastian Soriano MD Proceduralist   Alix Rodriguez, RN Circulator   Radha Goinsn, CRNA CRNA   prakash sharma rn [facility]     Medications  General anesthesia - See anesthesia  record.    Preprocedure  A history and physical has been performed, and patient medication   allergies have been reviewed. The patient's tolerance of previous   anesthesia has been reviewed. The risks and benefits of the procedure and   the sedation options and risks were discussed with the patient. All   questions were answered and informed consent obtained.    ASA Score: ASA 2 - Patient with mild systemic disease with no functional   limitations  Mallampati Airway Score: II (hard and soft palate, upper portion of   tonsils anduvula visible)    Details of the Procedure  The patient underwent general anesthesia, which was administered by an   anesthesia professional. The patient's blood pressure, ECG, ETCO2, heart   rate, level of consciousness, oxygen and respirations were monitored   throughout the procedure. A digital rectal exam was performed. A perianal   exam was performed. The scope was introduced through the anus and advanced   to the transverse colon. Retroflexion was performed in the rectum. The   quality of bowel preparation was evaluated using the Paxton Bowel   Preparation Scale with scores of: right colon = not assessed, transverse   colon = 1, left colon = 1. The total BBPS score was 2. Bowel prep was not   adequate. The patient experienced no blood loss. The procedure was   difficult due to poor preparation. The patient tolerated the procedure   well. There were no apparent adverse events.     Scope: Colonoscope  Scope Serial: [id number]    Events  Procedure Events   Event Event Time     Procedure Events   Event Event Time   ENDO SCOPE IN TIME 7/5/2023  9:47 AM   ENDO SCOPE OUT TIME 7/5/2023  9:54 AM     CECAL WITHDRAWAL TIME: ?    Findings  External medium hemorrhoids observed during digital rectal exam  Polyp in the rectum; completely removed en bloc by cold snare and   retrieved specimen  The transverse colon appeared normal.  The descending colon appeared normal.  One sessile polyp measuring smaller  than 5 mm in the rectum; snare   ligation performed  The rectum appeared normal.         Office Visit on 08/15/2023   Component Date Value Ref Range Status    Sodium 08/15/2023 140  136 - 145 mmol/L Final    Potassium 08/15/2023 3.7  3.5 - 5.1 mmol/L Final    Chloride 08/15/2023 107  98 - 107 mmol/L Final    CO2 08/15/2023 27  21 - 32 mmol/L Final    Anion Gap 08/15/2023 10  7 - 16 mmol/L Final    Glucose 08/15/2023 84  74 - 106 mg/dL Final    BUN 08/15/2023 10  7 - 18 mg/dL Final    Creatinine 08/15/2023 1.00  0.55 - 1.02 mg/dL Final    BUN/Creatinine Ratio 08/15/2023 10  6 - 20 Final    Calcium 08/15/2023 8.9  8.5 - 10.1 mg/dL Final    eGFR 08/15/2023 61  >=60 mL/min/1.73m2 Final   Hospital Outpatient Visit on 07/05/2023   Component Date Value Ref Range Status    Case Report 07/05/2023    Final                    Value:Surgical Pathology                                Case: [id number]                                   Authorizing Provider:  Sebastian Soriano MD        Collected:           07/05/2023 09:53 AM          Ordering Location:     [facility] - Received:            07/06/2023 08:48 AM                                 Endoscopy                                                                    Pathologist:           Eddie Bunn MD                                                       Specimen:    Rectum, 1 rectal polyp                                                                     Final Diagnosis 07/05/2023    Final                    Value:This result contains rich text formatting which cannot be displayed here.    Comments 07/05/2023    Final                    Value:This result contains rich text formatting which cannot be displayed here.    Gross Description 07/05/2023    Final                    Value:This result contains rich text formatting which cannot be displayed here.    Microscopic Description 07/05/2023    Final                    Value:This result contains rich text  formatting which cannot be displayed here.    Laboratory Notes 07/05/2023    Final                    Value:This result contains rich text formatting which cannot be displayed here.   Office Visit on 06/19/2023   Component Date Value Ref Range Status    POC Amphetamines 06/19/2023 Negative  Negative, Inconclusive Final    POC Barbiturates 06/19/2023 Negative  Negative, Inconclusive Final    POC Benzodiazepines 06/19/2023 Negative  Negative, Inconclusive Final    POC Cocaine 06/19/2023 Negative  Negative, Inconclusive Final    POC THC 06/19/2023 Negative  Negative, Inconclusive Final    POC Methadone 06/19/2023 Negative  Negative, Inconclusive Final    POC Methamphetamine 06/19/2023 Negative  Negative, Inconclusive Final    POC Opiates 06/19/2023 Presumptive Positive (A)  Negative, Inconclusive Final    POC Oxycodone 06/19/2023 Negative  Negative, Inconclusive Final    POC Phencyclidine 06/19/2023 Negative  Negative, Inconclusive Final    POC Methylenedioxymethamphetamine * 06/19/2023 Negative  Negative, Inconclusive Final    POC Tricyclic Antidepressants 06/19/2023 Negative  Negative, Inconclusive Final    POC Buprenorphine 06/19/2023 Negative   Final     Acceptable 06/19/2023 Yes   Final    POC Temperature (Urine) 06/19/2023 94   Final   Office Visit on 05/15/2023   Component Date Value Ref Range Status    ProBNP 05/15/2023 21  1 - 125 pg/mL Final    Sodium 05/15/2023 138  136 - 145 mmol/L Final    Potassium 05/15/2023 3.8  3.5 - 5.1 mmol/L Final    Chloride 05/15/2023 107  98 - 107 mmol/L Final    CO2 05/15/2023 27  21 - 32 mmol/L Final    Anion Gap 05/15/2023 8  7 - 16 mmol/L Final    Glucose 05/15/2023 83  74 - 106 mg/dL Final    BUN 05/15/2023 9  7 - 18 mg/dL Final    Creatinine 05/15/2023 0.87  0.55 - 1.02 mg/dL Final    BUN/Creatinine Ratio 05/15/2023 10  6 - 20 Final    Calcium 05/15/2023 8.6  8.5 - 10.1 mg/dL Final    Total Protein 05/15/2023 7.1  6.4 - 8.2 g/dL Final    Albumin 05/15/2023  3.7  3.5 - 5.0 g/dL Final    Globulin 05/15/2023 3.4  2.0 - 4.0 g/dL Final    A/G Ratio 05/15/2023 1.1   Final    Bilirubin, Total 05/15/2023 0.9  >0.0 - 1.2 mg/dL Final    Alk Phos 05/15/2023 66  55 - 142 U/L Final    ALT 05/15/2023 12 (L)  13 - 56 U/L Final    AST 05/15/2023 13 (L)  15 - 37 U/L Final    eGFR 05/15/2023 73  >=60 mL/min/1.73m2 Final    WBC 05/15/2023 4.40 (L)  4.50 - 11.00 K/uL Final    RBC 05/15/2023 4.31  4.20 - 5.40 M/uL Final    Hemoglobin 05/15/2023 12.0  12.0 - 16.0 g/dL Final    Hematocrit 05/15/2023 39.0  38.0 - 47.0 % Final    MCV 05/15/2023 90.5  80.0 - 96.0 fL Final    MCH 05/15/2023 27.8  27.0 - 31.0 pg Final    MCHC 05/15/2023 30.8 (L)  32.0 - 36.0 g/dL Final    RDW 05/15/2023 15.0 (H)  11.5 - 14.5 % Final    Platelet Count 05/15/2023 274  150 - 400 K/uL Final    MPV 05/15/2023 10.9  9.4 - 12.4 fL Final    Neutrophils % 05/15/2023 38.9 (L)  53.0 - 65.0 % Final    Lymphocytes % 05/15/2023 45.2 (H)  27.0 - 41.0 % Final    Monocytes % 05/15/2023 5.7  2.0 - 6.0 % Final    Eosinophils % 05/15/2023 0.2 (L)  1.0 - 4.0 % Final    Basophils % 05/15/2023 0.2  0.0 - 1.0 % Final    Immature Granulocytes % 05/15/2023 9.8 (H)  0.0 - 0.4 % Final    nRBC, Auto 05/15/2023 0.0  <=0.0 % Final    Neutrophils, Abs 05/15/2023 1.71 (L)  1.80 - 7.70 K/uL Final    Lymphocytes, Absolute 05/15/2023 1.99  1.00 - 4.80 K/uL Final    Monocytes, Absolute 05/15/2023 0.25  0.00 - 0.80 K/uL Final    Eosinophils, Absolute 05/15/2023 0.01  0.00 - 0.50 K/uL Final    Basophils, Absolute 05/15/2023 0.01  0.00 - 0.20 K/uL Final    Immature Granulocytes, Absolute 05/15/2023 0.43 (H)  0.00 - 0.04 K/uL Final    nRBC, Absolute 05/15/2023 0.00  <=0.00 x10e3/uL Final    Diff Type 05/15/2023 Manual   Final    Segmented Neutrophils, Man % 05/15/2023 34 (L)  50 - 62 % Final    Lymphocytes, Man % 05/15/2023 62 (H)  27 - 41 % Final    Monocytes, Man % 05/15/2023 4  2 - 6 % Final    Platelet Morphology 05/15/2023 Normal  Normal Final     RBC Morphology 05/15/2023 Normal   Final         Orders Placed This Encounter   Procedures    POCT Urine Drug Screen Presump     Interpretive Information:     Negative:  No drug detected at the cut off level.   Positive:  This result represents presumptive positive for the   tested drug, other substances may yield a positive response other   than the analyte of interest. This result should be utilized for   diagnostic purpose only. Confirmation testing will be performed upon physician request only.          Requested Prescriptions     Signed Prescriptions Disp Refills    HYDROcodone-acetaminophen (NORCO) 7.5-325 mg per tablet 30 tablet 0     Sig: Take 1 tablet by mouth once daily.    HYDROcodone-acetaminophen (NORCO) 7.5-325 mg per tablet 30 tablet 0     Sig: Take 1 tablet by mouth once daily.    HYDROcodone-acetaminophen (NORCO) 7.5-325 mg per tablet 30 tablet 0     Sig: Take 1 tablet by mouth once daily.       Assessment:     1. Lumbosacral radiculopathy    2. Disorder of sacrum    3. Encounter for long-term (current) use of other medications         A's of Opioid Risk Assessment  Activity:Patient can perform ADL.   Analgesia:Patients pain is partially controlled by current medication. Patient has tried OTC medications such as Tylenol and Ibuprofen with out relief.   Adverse Effects: Patient denies constipation or sedation.  Aberrant Behavior:  reviewed with no aberrant drug seeking/taking behavior.  Overdose reversal drug naloxone discussed    Drug screen reviewed      Plan:      Last refill [facility] September 23, 2023 Norco 7.5 Number 30 tablets      Patient had procedure April 2023  Bilateral lumbar L4 through S1 medial branch block # 1, April 13, 2023 she states she had 100% relief after procedure, the procedure did help improve her level of function      Her daughter is with her today she states she will monitor her mother's medication    She would like to continue current medication is helping control her  discomfort    Continue home exercise program as directed    Follow-up 3 months    Dr. Mendez, April 2024    Bring original prescription medication bottles/container/box with labels to each visit

## 2023-10-25 ENCOUNTER — OFFICE VISIT (OUTPATIENT)
Dept: PAIN MEDICINE | Facility: CLINIC | Age: 68
End: 2023-10-25
Payer: COMMERCIAL

## 2023-10-25 VITALS
SYSTOLIC BLOOD PRESSURE: 135 MMHG | HEIGHT: 62 IN | DIASTOLIC BLOOD PRESSURE: 71 MMHG | BODY MASS INDEX: 32.39 KG/M2 | HEART RATE: 51 BPM | WEIGHT: 176 LBS | RESPIRATION RATE: 20 BRPM

## 2023-10-25 DIAGNOSIS — Z79.899 ENCOUNTER FOR LONG-TERM (CURRENT) USE OF OTHER MEDICATIONS: ICD-10-CM

## 2023-10-25 DIAGNOSIS — M53.3 DISORDER OF SACRUM: Chronic | ICD-10-CM

## 2023-10-25 DIAGNOSIS — M54.17 LUMBOSACRAL RADICULOPATHY: Primary | Chronic | ICD-10-CM

## 2023-10-25 LAB
CTP QC/QA: YES
POC (AMP) AMPHETAMINE: NEGATIVE
POC (BAR) BARBITURATES: NEGATIVE
POC (BUP) BUPRENORPHINE: NEGATIVE
POC (BZO) BENZODIAZEPINES: NEGATIVE
POC (COC) COCAINE: NEGATIVE
POC (MDMA) METHYLENEDIOXYMETHAMPHETAMINE 3,4: NEGATIVE
POC (MET) METHAMPHETAMINE: NEGATIVE
POC (MOP) OPIATES: ABNORMAL
POC (MTD) METHADONE: NEGATIVE
POC (OXY) OXYCODONE: NEGATIVE
POC (PCP) PHENCYCLIDINE: NEGATIVE
POC (TCA) TRICYCLIC ANTIDEPRESSANTS: NEGATIVE
POC TEMPERATURE (URINE): 92
POC THC: NEGATIVE

## 2023-10-25 PROCEDURE — 3288F FALL RISK ASSESSMENT DOCD: CPT | Mod: CPTII,,, | Performed by: PHYSICIAN ASSISTANT

## 2023-10-25 PROCEDURE — 1125F AMNT PAIN NOTED PAIN PRSNT: CPT | Mod: CPTII,,, | Performed by: PHYSICIAN ASSISTANT

## 2023-10-25 PROCEDURE — 3008F PR BODY MASS INDEX (BMI) DOCUMENTED: ICD-10-PCS | Mod: CPTII,,, | Performed by: PHYSICIAN ASSISTANT

## 2023-10-25 PROCEDURE — 99214 OFFICE O/P EST MOD 30 MIN: CPT | Mod: S$PBB,,, | Performed by: PHYSICIAN ASSISTANT

## 2023-10-25 PROCEDURE — 99214 OFFICE O/P EST MOD 30 MIN: CPT | Mod: PBBFAC | Performed by: PHYSICIAN ASSISTANT

## 2023-10-25 PROCEDURE — 3008F BODY MASS INDEX DOCD: CPT | Mod: CPTII,,, | Performed by: PHYSICIAN ASSISTANT

## 2023-10-25 PROCEDURE — 3075F SYST BP GE 130 - 139MM HG: CPT | Mod: CPTII,,, | Performed by: PHYSICIAN ASSISTANT

## 2023-10-25 PROCEDURE — 99999PBSHW POCT URINE DRUG SCREEN PRESUMP: ICD-10-PCS | Mod: PBBFAC,,,

## 2023-10-25 PROCEDURE — 99214 PR OFFICE/OUTPT VISIT, EST, LEVL IV, 30-39 MIN: ICD-10-PCS | Mod: S$PBB,,, | Performed by: PHYSICIAN ASSISTANT

## 2023-10-25 PROCEDURE — 3075F PR MOST RECENT SYSTOLIC BLOOD PRESS GE 130-139MM HG: ICD-10-PCS | Mod: CPTII,,, | Performed by: PHYSICIAN ASSISTANT

## 2023-10-25 PROCEDURE — 3288F PR FALLS RISK ASSESSMENT DOCUMENTED: ICD-10-PCS | Mod: CPTII,,, | Performed by: PHYSICIAN ASSISTANT

## 2023-10-25 PROCEDURE — 1101F PR PT FALLS ASSESS DOC 0-1 FALLS W/OUT INJ PAST YR: ICD-10-PCS | Mod: CPTII,,, | Performed by: PHYSICIAN ASSISTANT

## 2023-10-25 PROCEDURE — 3078F PR MOST RECENT DIASTOLIC BLOOD PRESSURE < 80 MM HG: ICD-10-PCS | Mod: CPTII,,, | Performed by: PHYSICIAN ASSISTANT

## 2023-10-25 PROCEDURE — 3078F DIAST BP <80 MM HG: CPT | Mod: CPTII,,, | Performed by: PHYSICIAN ASSISTANT

## 2023-10-25 PROCEDURE — 1101F PT FALLS ASSESS-DOCD LE1/YR: CPT | Mod: CPTII,,, | Performed by: PHYSICIAN ASSISTANT

## 2023-10-25 PROCEDURE — 80305 DRUG TEST PRSMV DIR OPT OBS: CPT | Mod: PBBFAC | Performed by: PHYSICIAN ASSISTANT

## 2023-10-25 PROCEDURE — 1125F PR PAIN SEVERITY QUANTIFIED, PAIN PRESENT: ICD-10-PCS | Mod: CPTII,,, | Performed by: PHYSICIAN ASSISTANT

## 2023-10-25 PROCEDURE — 1159F MED LIST DOCD IN RCRD: CPT | Mod: CPTII,,, | Performed by: PHYSICIAN ASSISTANT

## 2023-10-25 PROCEDURE — 1159F PR MEDICATION LIST DOCUMENTED IN MEDICAL RECORD: ICD-10-PCS | Mod: CPTII,,, | Performed by: PHYSICIAN ASSISTANT

## 2023-10-25 PROCEDURE — 99999PBSHW POCT URINE DRUG SCREEN PRESUMP: Mod: PBBFAC,,,

## 2023-10-25 RX ORDER — HYDROCODONE BITARTRATE AND ACETAMINOPHEN 7.5; 325 MG/1; MG/1
1 TABLET ORAL DAILY
Qty: 30 TABLET | Refills: 0 | Status: SHIPPED | OUTPATIENT
Start: 2023-10-25 | End: 2024-01-17 | Stop reason: SDUPTHER

## 2023-10-25 RX ORDER — HYDROCODONE BITARTRATE AND ACETAMINOPHEN 7.5; 325 MG/1; MG/1
1 TABLET ORAL DAILY
Qty: 30 TABLET | Refills: 0 | Status: SHIPPED | OUTPATIENT
Start: 2023-12-23 | End: 2024-01-17 | Stop reason: SDUPTHER

## 2023-10-25 RX ORDER — HYDROCODONE BITARTRATE AND ACETAMINOPHEN 7.5; 325 MG/1; MG/1
1 TABLET ORAL DAILY
Qty: 30 TABLET | Refills: 0 | Status: SHIPPED | OUTPATIENT
Start: 2023-11-24 | End: 2024-01-17 | Stop reason: SDUPTHER

## 2023-11-26 DIAGNOSIS — G89.4 CHRONIC PAIN SYNDROME: ICD-10-CM

## 2023-11-28 RX ORDER — NAPROXEN 500 MG/1
500 TABLET ORAL DAILY PRN
Qty: 30 TABLET | Refills: 1 | Status: SHIPPED | OUTPATIENT
Start: 2023-11-28 | End: 2024-02-19

## 2023-12-19 ENCOUNTER — OFFICE VISIT (OUTPATIENT)
Dept: FAMILY MEDICINE | Facility: CLINIC | Age: 68
End: 2023-12-19
Payer: COMMERCIAL

## 2023-12-19 VITALS
BODY MASS INDEX: 31.35 KG/M2 | RESPIRATION RATE: 20 BRPM | DIASTOLIC BLOOD PRESSURE: 80 MMHG | HEART RATE: 60 BPM | SYSTOLIC BLOOD PRESSURE: 132 MMHG | TEMPERATURE: 99 F | WEIGHT: 183.63 LBS | HEIGHT: 64 IN | OXYGEN SATURATION: 97 %

## 2023-12-19 DIAGNOSIS — Z12.11 SCREENING FOR MALIGNANT NEOPLASM OF COLON: ICD-10-CM

## 2023-12-19 DIAGNOSIS — K59.00 CONSTIPATION, UNSPECIFIED CONSTIPATION TYPE: Primary | ICD-10-CM

## 2023-12-19 DIAGNOSIS — R60.9 RETENTION OF FLUID: ICD-10-CM

## 2023-12-19 DIAGNOSIS — E87.6 HYPOKALEMIA: ICD-10-CM

## 2023-12-19 DIAGNOSIS — E78.5 HYPERLIPIDEMIA, UNSPECIFIED HYPERLIPIDEMIA TYPE: ICD-10-CM

## 2023-12-19 DIAGNOSIS — Z23 ENCOUNTER FOR IMMUNIZATION: ICD-10-CM

## 2023-12-19 DIAGNOSIS — Z12.11 SCREENING FOR COLON CANCER: Primary | ICD-10-CM

## 2023-12-19 LAB
BASOPHILS # BLD AUTO: 0 K/UL (ref 0–0.2)
BASOPHILS NFR BLD AUTO: 0 % (ref 0–1)
DIFFERENTIAL METHOD BLD: ABNORMAL
EOSINOPHIL # BLD AUTO: 0 K/UL (ref 0–0.5)
EOSINOPHIL NFR BLD AUTO: 0 % (ref 1–4)
ERYTHROCYTE [DISTWIDTH] IN BLOOD BY AUTOMATED COUNT: 15.5 % (ref 11.5–14.5)
HCT VFR BLD AUTO: 35.9 % (ref 38–47)
HGB BLD-MCNC: 11.7 G/DL (ref 12–16)
IMM GRANULOCYTES # BLD AUTO: 0.06 K/UL (ref 0–0.04)
IMM GRANULOCYTES NFR BLD: 1.9 % (ref 0–0.4)
LYMPHOCYTES # BLD AUTO: 1.48 K/UL (ref 1–4.8)
LYMPHOCYTES NFR BLD AUTO: 45.8 % (ref 27–41)
MCH RBC QN AUTO: 28.7 PG (ref 27–31)
MCHC RBC AUTO-ENTMCNC: 32.6 G/DL (ref 32–36)
MCV RBC AUTO: 88.2 FL (ref 80–96)
MONOCYTES # BLD AUTO: 0.25 K/UL (ref 0–0.8)
MONOCYTES NFR BLD AUTO: 7.7 % (ref 2–6)
MPC BLD CALC-MCNC: 11.2 FL (ref 9.4–12.4)
NEUTROPHILS # BLD AUTO: 1.44 K/UL (ref 1.8–7.7)
NEUTROPHILS NFR BLD AUTO: 44.6 % (ref 53–65)
NRBC # BLD AUTO: 0 X10E3/UL
NRBC, AUTO (.00): 0 %
PLATELET # BLD AUTO: 281 K/UL (ref 150–400)
RBC # BLD AUTO: 4.07 M/UL (ref 4.2–5.4)
WBC # BLD AUTO: 3.23 K/UL (ref 4.5–11)

## 2023-12-19 PROCEDURE — 3075F PR MOST RECENT SYSTOLIC BLOOD PRESS GE 130-139MM HG: ICD-10-PCS | Mod: ,,, | Performed by: NURSE PRACTITIONER

## 2023-12-19 PROCEDURE — 1101F PT FALLS ASSESS-DOCD LE1/YR: CPT | Mod: ,,, | Performed by: NURSE PRACTITIONER

## 2023-12-19 PROCEDURE — 3288F FALL RISK ASSESSMENT DOCD: CPT | Mod: ,,, | Performed by: NURSE PRACTITIONER

## 2023-12-19 PROCEDURE — 3288F PR FALLS RISK ASSESSMENT DOCUMENTED: ICD-10-PCS | Mod: ,,, | Performed by: NURSE PRACTITIONER

## 2023-12-19 PROCEDURE — 1159F PR MEDICATION LIST DOCUMENTED IN MEDICAL RECORD: ICD-10-PCS | Mod: ,,, | Performed by: NURSE PRACTITIONER

## 2023-12-19 PROCEDURE — 85025 COMPLETE CBC W/AUTO DIFF WBC: CPT | Mod: ,,, | Performed by: CLINICAL MEDICAL LABORATORY

## 2023-12-19 PROCEDURE — 83880 ASSAY OF NATRIURETIC PEPTIDE: CPT | Mod: ,,, | Performed by: CLINICAL MEDICAL LABORATORY

## 2023-12-19 PROCEDURE — 83880 NT-PRO NATRIURETIC PEPTIDE: ICD-10-PCS | Mod: ,,, | Performed by: CLINICAL MEDICAL LABORATORY

## 2023-12-19 PROCEDURE — 80053 COMPREHEN METABOLIC PANEL: CPT | Mod: ,,, | Performed by: CLINICAL MEDICAL LABORATORY

## 2023-12-19 PROCEDURE — 1160F PR REVIEW ALL MEDS BY PRESCRIBER/CLIN PHARMACIST DOCUMENTED: ICD-10-PCS | Mod: ,,, | Performed by: NURSE PRACTITIONER

## 2023-12-19 PROCEDURE — G0008 FLU VACCINE - QUADRIVALENT - ADJUVANTED: ICD-10-PCS | Mod: ,,, | Performed by: NURSE PRACTITIONER

## 2023-12-19 PROCEDURE — 1101F PR PT FALLS ASSESS DOC 0-1 FALLS W/OUT INJ PAST YR: ICD-10-PCS | Mod: ,,, | Performed by: NURSE PRACTITIONER

## 2023-12-19 PROCEDURE — 80053 COMPREHENSIVE METABOLIC PANEL: ICD-10-PCS | Mod: ,,, | Performed by: CLINICAL MEDICAL LABORATORY

## 2023-12-19 PROCEDURE — 80061 LIPID PANEL: CPT | Mod: ,,, | Performed by: CLINICAL MEDICAL LABORATORY

## 2023-12-19 PROCEDURE — 3079F DIAST BP 80-89 MM HG: CPT | Mod: ,,, | Performed by: NURSE PRACTITIONER

## 2023-12-19 PROCEDURE — 90694 VACC AIIV4 NO PRSRV 0.5ML IM: CPT | Mod: ,,, | Performed by: NURSE PRACTITIONER

## 2023-12-19 PROCEDURE — 3008F BODY MASS INDEX DOCD: CPT | Mod: ,,, | Performed by: NURSE PRACTITIONER

## 2023-12-19 PROCEDURE — 1160F RVW MEDS BY RX/DR IN RCRD: CPT | Mod: ,,, | Performed by: NURSE PRACTITIONER

## 2023-12-19 PROCEDURE — 1159F MED LIST DOCD IN RCRD: CPT | Mod: ,,, | Performed by: NURSE PRACTITIONER

## 2023-12-19 PROCEDURE — 99214 OFFICE O/P EST MOD 30 MIN: CPT | Mod: ,,, | Performed by: NURSE PRACTITIONER

## 2023-12-19 PROCEDURE — 99214 PR OFFICE/OUTPT VISIT, EST, LEVL IV, 30-39 MIN: ICD-10-PCS | Mod: ,,, | Performed by: NURSE PRACTITIONER

## 2023-12-19 PROCEDURE — 3075F SYST BP GE 130 - 139MM HG: CPT | Mod: ,,, | Performed by: NURSE PRACTITIONER

## 2023-12-19 PROCEDURE — 3079F PR MOST RECENT DIASTOLIC BLOOD PRESSURE 80-89 MM HG: ICD-10-PCS | Mod: ,,, | Performed by: NURSE PRACTITIONER

## 2023-12-19 PROCEDURE — 85025 CBC WITH DIFFERENTIAL: ICD-10-PCS | Mod: ,,, | Performed by: CLINICAL MEDICAL LABORATORY

## 2023-12-19 PROCEDURE — G0008 ADMIN INFLUENZA VIRUS VAC: HCPCS | Mod: ,,, | Performed by: NURSE PRACTITIONER

## 2023-12-19 PROCEDURE — 3008F PR BODY MASS INDEX (BMI) DOCUMENTED: ICD-10-PCS | Mod: ,,, | Performed by: NURSE PRACTITIONER

## 2023-12-19 PROCEDURE — 80061 LIPID PANEL: ICD-10-PCS | Mod: ,,, | Performed by: CLINICAL MEDICAL LABORATORY

## 2023-12-19 PROCEDURE — 90694 FLU VACCINE - QUADRIVALENT - ADJUVANTED: ICD-10-PCS | Mod: ,,, | Performed by: NURSE PRACTITIONER

## 2023-12-19 RX ORDER — LUBIPROSTONE 24 UG/1
24 CAPSULE ORAL 2 TIMES DAILY WITH MEALS
Qty: 60 CAPSULE | Refills: 2 | Status: SHIPPED | OUTPATIENT
Start: 2023-12-19 | End: 2024-02-13

## 2023-12-19 RX ORDER — POLYETHYLENE GLYCOL 3350, SODIUM SULFATE ANHYDROUS, SODIUM BICARBONATE, SODIUM CHLORIDE, POTASSIUM CHLORIDE 236; 22.74; 6.74; 5.86; 2.97 G/4L; G/4L; G/4L; G/4L; G/4L
4 POWDER, FOR SOLUTION ORAL ONCE
Qty: 4000 ML | Refills: 0 | Status: SHIPPED | OUTPATIENT
Start: 2023-12-19 | End: 2023-12-19

## 2023-12-19 NOTE — PROGRESS NOTES
Clinic Note    Xavier Rodriguez is a 68 y.o. female     Chief Complaint:   Chief Complaint   Patient presents with    Constipation     Miralax not working and takes it daily    Health Maintenance     COVID-19 Vaccine(1) Never done  TETANUS VACCINE Never done  Mammogram Never done  Hemoglobin A1c (Diabetic Prevention Screening) Never done  DEXA Scan Never done  Shingles Vaccine(1 of 2) Never done  RSV Vaccine (Age 60+ and Pregnant patients)(1 - 1-dose 60+ series) Never done  Colorectal Cancer Screening due on 05/13/2019  Pneumococcal Vaccines (Age 65+)(1 - PCV) Never done  Influenza Vaccine(1) due on 09/01/2023         Subjective:    Patient comes in today with granddaughter. Patient complains of constipation. Admits to chronic constipation. Patient is poor historian. Patient reports she has taken miralax and linzess with no improvement. Patient states last bm 3-4 days ago. Admits to expressing gas. Patient admits to chronic opioid use. Patient denies recent colonoscopy.   Patient does not bring meds to appointment. Denies swelling.   Otherwise without complaint or concern.        Allergies:   Review of patient's allergies indicates:  No Known Allergies     Past Medical History:  Past Medical History:   Diagnosis Date    Chronic low back pain     Depression     Dysfunctional voiding of urine 11/10/2021    Patient states she does not urinate.  She was taken off of lasix which was the only thing that helped her empty. PVR 0 ml Chronic constipation, lifelong.  Has been out of Linzess for a month now. Renal function WNL when last checked in May    Edema     Hyperlipidemia     Hypertension     IBS (irritable bowel syndrome)         Current Medications:    Current Outpatient Medications:     amitriptyline (ELAVIL) 25 MG tablet, Take 25 mg by mouth., Disp: , Rfl:     furosemide (LASIX) 20 MG tablet, TAKE 1 TABLET BY MOUTH EVERY DAY AS NEEDED, Disp: 30 tablet, Rfl: 0    HYDROcodone-acetaminophen (NORCO) 7.5-325 mg per tablet,  "Take 1 tablet by mouth once daily., Disp: 30 tablet, Rfl: 0    HYDROcodone-acetaminophen (NORCO) 7.5-325 mg per tablet, Take 1 tablet by mouth once daily., Disp: 30 tablet, Rfl: 0    [START ON 12/23/2023] HYDROcodone-acetaminophen (NORCO) 7.5-325 mg per tablet, Take 1 tablet by mouth once daily., Disp: 30 tablet, Rfl: 0    lovastatin (MEVACOR) 20 MG tablet, TAKE 1 TABLET BY MOUTH EVERY DAY IN THE EVENING, Disp: 90 tablet, Rfl: 1    lubiprostone (AMITIZA) 24 MCG Cap, Take 1 capsule (24 mcg total) by mouth 2 (two) times daily with meals., Disp: 60 capsule, Rfl: 2    naloxegoL (MOVANTIK) 25 mg tablet, Take 25 mg by mouth once daily., Disp: 90 tablet, Rfl: 1    naproxen (NAPROSYN) 500 MG tablet, TAKE 1 TABLET BY MOUTH EVERY DAY AS NEEDED, Disp: 30 tablet, Rfl: 1    potassium chloride SA (K-DUR,KLOR-CON) 20 MEQ tablet, Take 1 tablet (20 mEq total) by mouth once daily., Disp: 90 tablet, Rfl: 1    tamsulosin (FLOMAX) 0.4 mg Cap, Take 1 capsule (0.4 mg total) by mouth once daily., Disp: 90 capsule, Rfl: 1    topiramate 25 mg capsule, Take 25 mg by mouth., Disp: , Rfl:        Review of Systems   Constitutional:  Negative for fever.   Respiratory:  Negative for cough and shortness of breath.    Cardiovascular:  Negative for chest pain, palpitations and leg swelling.   Gastrointestinal:  Positive for constipation. Negative for abdominal pain, diarrhea, nausea and vomiting.   Neurological:  Negative for headaches.          Objective:    /80 (BP Location: Left arm, Patient Position: Sitting, BP Method: Medium (Manual))   Pulse 60   Temp 98.7 °F (37.1 °C) (Oral)   Resp 20   Ht 5' 4" (1.626 m)   Wt 83.3 kg (183 lb 9.6 oz)   SpO2 97%   BMI 31.51 kg/m²      Physical Exam  Eyes:      Extraocular Movements: Extraocular movements intact.   Cardiovascular:      Rate and Rhythm: Normal rate and regular rhythm.      Pulses: Normal pulses.      Heart sounds: Normal heart sounds.   Pulmonary:      Effort: Pulmonary effort is " normal.      Breath sounds: Normal breath sounds.   Abdominal:      General: There is no distension.      Palpations: Abdomen is soft.      Tenderness: There is no abdominal tenderness. There is no guarding or rebound.   Musculoskeletal:      Right lower leg: No edema.      Left lower leg: No edema.   Neurological:      Mental Status: She is alert. Mental status is at baseline.          Assessment and Plan:    1. Constipation, unspecified constipation type    2. Screening for malignant neoplasm of colon    3. Encounter for immunization    4. Hypokalemia    5. Hyperlipidemia, unspecified hyperlipidemia type    6. Retention of fluid         Constipation, unspecified constipation type  -     CBC Auto Differential; Future; Expected date: 12/19/2023  -     lubiprostone (AMITIZA) 24 MCG Cap; Take 1 capsule (24 mcg total) by mouth 2 (two) times daily with meals.  Dispense: 60 capsule; Refill: 2  -high fiber diet, exercise, drink plenty of water  -education attached to AVS  -patient has tried multiple otc laxative and linzess with no improvement.     Screening for malignant neoplasm of colon  -     Colonoscopy; Future; Expected date: 12/19/2023    Encounter for immunization  -     Influenza (FLUAD) - Quadrivalent (Adjuvanted) *Preferred* (65+) (PF)    Hypokalemia  -     Comprehensive Metabolic Panel; Future; Expected date: 12/19/2023    Hyperlipidemia, unspecified hyperlipidemia type  -     CBC Auto Differential; Future; Expected date: 12/19/2023  -     Lipid Panel; Future; Expected date: 12/19/2023    Retention of fluid  -     NT-Pro Natriuretic Peptide; Future; Expected date: 12/19/2023    -bring meds to appointments      There are no Patient Instructions on file for this visit.   Follow up in about 4 months (around 4/19/2024).

## 2023-12-20 LAB
ALBUMIN SERPL BCP-MCNC: 3.9 G/DL (ref 3.5–5)
ALBUMIN/GLOB SERPL: 1.1 {RATIO}
ALP SERPL-CCNC: 63 U/L (ref 55–142)
ALT SERPL W P-5'-P-CCNC: 16 U/L (ref 13–56)
ANION GAP SERPL CALCULATED.3IONS-SCNC: 12 MMOL/L (ref 7–16)
AST SERPL W P-5'-P-CCNC: 16 U/L (ref 15–37)
BILIRUB SERPL-MCNC: 1 MG/DL (ref ?–1.2)
BUN SERPL-MCNC: 8 MG/DL (ref 7–18)
BUN/CREAT SERPL: 8 (ref 6–20)
CALCIUM SERPL-MCNC: 9.2 MG/DL (ref 8.5–10.1)
CHLORIDE SERPL-SCNC: 103 MMOL/L (ref 98–107)
CHOLEST SERPL-MCNC: 228 MG/DL (ref 0–200)
CHOLEST/HDLC SERPL: 3.3 {RATIO}
CO2 SERPL-SCNC: 29 MMOL/L (ref 21–32)
CREAT SERPL-MCNC: 0.98 MG/DL (ref 0.55–1.02)
EGFR (NO RACE VARIABLE) (RUSH/TITUS): 63 ML/MIN/1.73M2
GLOBULIN SER-MCNC: 3.7 G/DL (ref 2–4)
GLUCOSE SERPL-MCNC: 92 MG/DL (ref 74–106)
HDLC SERPL-MCNC: 69 MG/DL (ref 40–60)
LDLC SERPL CALC-MCNC: 131 MG/DL
LDLC/HDLC SERPL: 1.9 {RATIO}
NONHDLC SERPL-MCNC: 159 MG/DL
NT-PROBNP SERPL-MCNC: 22 PG/ML (ref 1–125)
POTASSIUM SERPL-SCNC: 3.7 MMOL/L (ref 3.5–5.1)
PROT SERPL-MCNC: 7.6 G/DL (ref 6.4–8.2)
SODIUM SERPL-SCNC: 140 MMOL/L (ref 136–145)
TRIGL SERPL-MCNC: 139 MG/DL (ref 35–150)
VLDLC SERPL-MCNC: 28 MG/DL

## 2023-12-21 ENCOUNTER — TELEPHONE (OUTPATIENT)
Dept: FAMILY MEDICINE | Facility: CLINIC | Age: 68
End: 2023-12-21
Payer: COMMERCIAL

## 2023-12-21 NOTE — TELEPHONE ENCOUNTER
----- Message from TEENA Ross sent at 2023  7:55 AM CST -----  Cholesterol borderline elevated. Low cholesterol diet and exercise.   Mild anemia noted. Appears stable. Will monitor.  Bnp normal. Make sure only take lasix prn swelling. Not daily  Discuss results with daughter      [phone]- call made to pt. Pt daughter answered phone and verified pt . Voiced understanding and thanked me for the call back.

## 2024-01-17 NOTE — PROGRESS NOTES
Subjective:         Patient ID: Xavier Rodriguez is a 68 y.o. female.    Chief Complaint: Low-back Pain        Pain  This is a chronic problem. The current episode started more than 1 year ago. The problem occurs daily. The problem has been waxing and waning. Associated symptoms include arthralgias, headaches and neck pain. Pertinent negatives include no anorexia, change in bowel habit, chest pain, chills, fever, sore throat, swollen glands, urinary symptoms or vertigo.     Review of Systems   Constitutional:  Negative for activity change, appetite change, chills, fever and unexpected weight change.   HENT:  Negative for drooling, ear discharge, facial swelling, mouth dryness, nosebleeds, sore throat, trouble swallowing, voice change and goiter.    Eyes:  Negative for discharge and visual disturbance.   Respiratory:  Negative for apnea, choking, chest tightness, shortness of breath, wheezing and stridor.    Cardiovascular:  Negative for chest pain, palpitations and leg swelling.   Gastrointestinal:  Negative for abdominal distention, anorexia, change in bowel habit, diarrhea, rectal pain and fecal incontinence.   Endocrine: Negative for cold intolerance, heat intolerance, polydipsia, polyphagia and polyuria.   Genitourinary:  Negative for flank pain, frequency and hot flashes.   Musculoskeletal:  Positive for arthralgias, leg pain and neck pain.   Integumentary:  Negative for color change and pallor.   Allergic/Immunologic: Negative for immunocompromised state.   Neurological:  Positive for headaches. Negative for vertigo, syncope, facial asymmetry, speech difficulty, light-headedness, memory loss and coordination difficulties.   Hematological:  Negative for adenopathy. Does not bruise/bleed easily.   Psychiatric/Behavioral:  Negative for agitation, behavioral problems, confusion, decreased concentration, dysphoric mood, hallucinations, self-injury and suicidal ideas. The patient is not nervous/anxious and is not  "hyperactive.            Past Medical History:   Diagnosis Date    Chronic low back pain     Depression     Dysfunctional voiding of urine 11/10/2021    Patient states she does not urinate.  She was taken off of lasix which was the only thing that helped her empty. PVR 0 ml Chronic constipation, lifelong.  Has been out of Linzess for a month now. 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    INJECTION OF ANESTHETIC AGENT AROUND MEDIAL BRANCH NERVES INNERVATING LUMBAR FACET JOINT Bilateral 4/13/2023    Procedure: Bilateral L4-5,5-S1 MBB;  Surgeon: Ilsa Mendez MD;  Location: [facility];  Service: Pain Management;  Laterality: Bilateral;    TUMOR REMOVAL       Social History     Socioeconomic History    Marital status: Single   Tobacco Use    Smoking status: Former     Current packs/day: 0.00     Types: Cigarettes     Passive exposure: Past    Smokeless tobacco: Never    Tobacco comments:     Stop smoking about 15 years ago   Substance and Sexual Activity    Alcohol use: Never    Drug use: Yes     Types: Hydrocodone    Sexual activity: Not Currently     Partners: Male     Birth control/protection: None     Family History   Problem Relation Age of Onset    Heart disease Mother     Heart attack Mother     Heart disease Father         Heart attack    Heart attack Father      Review of patient's allergies indicates:  No Known Allergies     Objective:  Vitals:    01/22/24 1030   BP: 131/77   Pulse: 67   Resp: 20   Weight: 81.6 kg (180 lb)   Height: 5' 4" (1.626 m)   PainSc:   9         Physical Exam  Vitals and nursing note reviewed. Exam conducted with a chaperone present.   Constitutional:       General: She is awake. She is not in acute distress.     Appearance: Normal appearance. She is not ill-appearing, " toxic-appearing or diaphoretic.   HENT:      Head: Normocephalic and atraumatic.      Nose: Nose normal.      Mouth/Throat:      Mouth: Mucous membranes are moist.      Pharynx: Oropharynx is clear.   Eyes:      Conjunctiva/sclera: Conjunctivae normal.      Pupils: Pupils are equal, round, and reactive to light.   Cardiovascular:      Rate and Rhythm: Normal rate.   Pulmonary:      Effort: Pulmonary effort is normal. No respiratory distress.   Abdominal:      Palpations: Abdomen is soft.      Tenderness: There is no guarding.   Musculoskeletal:         General: Normal range of motion.      Cervical back: Normal range of motion and neck supple. No rigidity.      Thoracic back: Tenderness present.      Lumbar back: Tenderness present.   Skin:     General: Skin is warm and dry.      Coloration: Skin is not jaundiced or pale.   Neurological:      General: No focal deficit present.      Mental Status: She is alert and oriented to person, place, and time. Mental status is at baseline.      Cranial Nerves: No cranial nerve deficit (II-XII).   Psychiatric:         Mood and Affect: Mood normal.         Behavior: Behavior normal. Behavior is cooperative.         Thought Content: Thought content normal.           Colonoscopy  Narrative: Table formatting from the original result was not included.  Procedure Date  7/5/23    Impression  Overall   Impression:  Poor prep with thick pools and retained stools all   along the walls.  Rectosigmoid area was bad and descending colon improved   however transverse colon was again noted to be bad.  5 mm polyp seen in   the rectum removed with a snare.  Will schedule a repeat colonoscopy    Recommendation   Schedule repeat colonoscopy     Indication  Encounter for screening colonoscopy    Providers  Sebastian Soriano MD Proceduralist   Alix Rodriguez, RN Circulator   Radha Goinsn, CRNA CRNA   prakash sharma rn [facility]     Medications  General anesthesia - See anesthesia  record.    Preprocedure  A history and physical has been performed, and patient medication   allergies have been reviewed. The patient's tolerance of previous   anesthesia has been reviewed. The risks and benefits of the procedure and   the sedation options and risks were discussed with the patient. All   questions were answered and informed consent obtained.    ASA Score: ASA 2 - Patient with mild systemic disease with no functional   limitations  Mallampati Airway Score: II (hard and soft palate, upper portion of   tonsils anduvula visible)    Details of the Procedure  The patient underwent general anesthesia, which was administered by an   anesthesia professional. The patient's blood pressure, ECG, ETCO2, heart   rate, level of consciousness, oxygen and respirations were monitored   throughout the procedure. A digital rectal exam was performed. A perianal   exam was performed. The scope was introduced through the anus and advanced   to the transverse colon. Retroflexion was performed in the rectum. The   quality of bowel preparation was evaluated using the [place] Bowel   Preparation Scale with scores of: right colon = not assessed, transverse   colon = 1, left colon = 1. The total BBPS score was 2. Bowel prep was not   adequate. The patient experienced no blood loss. The procedure was   difficult due to poor preparation. The patient tolerated the procedure   well. There were no apparent adverse events.     Scope: Colonoscope  Scope Serial: [id number]    Events  Procedure Events   Event Event Time     Procedure Events   Event Event Time   ENDO SCOPE IN TIME 7/5/2023  9:47 AM   ENDO SCOPE OUT TIME 7/5/2023  9:54 AM     CECAL WITHDRAWAL TIME: ?    Findings  External medium hemorrhoids observed during digital rectal exam  Polyp in the rectum; completely removed en bloc by cold snare and   retrieved specimen  The transverse colon appeared normal.  The descending colon appeared normal.  One sessile polyp measuring smaller  than 5 mm in the rectum; snare   ligation performed  The rectum appeared normal.         Office Visit on 12/19/2023   Component Date Value Ref Range Status    Sodium 12/19/2023 140  136 - 145 mmol/L Final    Potassium 12/19/2023 3.7  3.5 - 5.1 mmol/L Final    Chloride 12/19/2023 103  98 - 107 mmol/L Final    CO2 12/19/2023 29  21 - 32 mmol/L Final    Anion Gap 12/19/2023 12  7 - 16 mmol/L Final    Glucose 12/19/2023 92  74 - 106 mg/dL Final    BUN 12/19/2023 8  7 - 18 mg/dL Final    Creatinine 12/19/2023 0.98  0.55 - 1.02 mg/dL Final    BUN/Creatinine Ratio 12/19/2023 8  6 - 20 Final    Calcium 12/19/2023 9.2  8.5 - 10.1 mg/dL Final    Total Protein 12/19/2023 7.6  6.4 - 8.2 g/dL Final    Albumin 12/19/2023 3.9  3.5 - 5.0 g/dL Final    Globulin 12/19/2023 3.7  2.0 - 4.0 g/dL Final    A/G Ratio 12/19/2023 1.1   Final    Bilirubin, Total 12/19/2023 1.0  >0.0 - 1.2 mg/dL Final    Alk Phos 12/19/2023 63  55 - 142 U/L Final    ALT 12/19/2023 16  13 - 56 U/L Final    AST 12/19/2023 16  15 - 37 U/L Final    eGFR 12/19/2023 63  >=60 mL/min/1.73m2 Final    Triglycerides 12/19/2023 139  35 - 150 mg/dL Final    Cholesterol 12/19/2023 228 (H)  0 - 200 mg/dL Final    HDL Cholesterol 12/19/2023 69 (H)  40 - 60 mg/dL Final    Cholesterol/HDL Ratio (Risk Factor) 12/19/2023 3.3   Final    Non-HDL 12/19/2023 159  mg/dL Final    LDL Calculated 12/19/2023 131  mg/dL Final    LDL/HDL 12/19/2023 1.9   Final    VLDL 12/19/2023 28  mg/dL Final    ProBNP 12/19/2023 22  1 - 125 pg/mL Final    WBC 12/19/2023 3.23 (L)  4.50 - 11.00 K/uL Final    RBC 12/19/2023 4.07 (L)  4.20 - 5.40 M/uL Final    Hemoglobin 12/19/2023 11.7 (L)  12.0 - 16.0 g/dL Final    Hematocrit 12/19/2023 35.9 (L)  38.0 - 47.0 % Final    MCV 12/19/2023 88.2  80.0 - 96.0 fL Final    MCH 12/19/2023 28.7  27.0 - 31.0 pg Final    MCHC 12/19/2023 32.6  32.0 - 36.0 g/dL Final    RDW 12/19/2023 15.5 (H)  11.5 - 14.5 % Final    Platelet Count 12/19/2023 281  150 - 400 K/uL Final     MPV 12/19/2023 11.2  9.4 - 12.4 fL Final    Neutrophils % 12/19/2023 44.6 (L)  53.0 - 65.0 % Final    Lymphocytes % 12/19/2023 45.8 (H)  27.0 - 41.0 % Final    Monocytes % 12/19/2023 7.7 (H)  2.0 - 6.0 % Final    Eosinophils % 12/19/2023 0.0 (L)  1.0 - 4.0 % Final    Basophils % 12/19/2023 0.0  0.0 - 1.0 % Final    Immature Granulocytes % 12/19/2023 1.9 (H)  0.0 - 0.4 % Final    nRBC, Auto 12/19/2023 0.0  <=0.0 % Final    Neutrophils, Abs 12/19/2023 1.44 (L)  1.80 - 7.70 K/uL Final    Lymphocytes, Absolute 12/19/2023 1.48  1.00 - 4.80 K/uL Final    Monocytes, Absolute 12/19/2023 0.25  0.00 - 0.80 K/uL Final    Eosinophils, Absolute 12/19/2023 0.00  0.00 - 0.50 K/uL Final    Basophils, Absolute 12/19/2023 0.00  0.00 - 0.20 K/uL Final    Immature Granulocytes, Absolute 12/19/2023 0.06 (H)  0.00 - 0.04 K/uL Final    nRBC, Absolute 12/19/2023 0.00  <=0.00 x10e3/uL Final    Diff Type 12/19/2023 Auto   Final   Office Visit on 10/25/2023   Component Date Value Ref Range Status    POC Amphetamines 10/25/2023 Negative  Negative, Inconclusive Final    POC Barbiturates 10/25/2023 Negative  Negative, Inconclusive Final    POC Benzodiazepines 10/25/2023 Negative  Negative, Inconclusive Final    POC Cocaine 10/25/2023 Negative  Negative, Inconclusive Final    POC THC 10/25/2023 Negative  Negative, Inconclusive Final    POC Methadone 10/25/2023 Negative  Negative, Inconclusive Final    POC Methamphetamine 10/25/2023 Negative  Negative, Inconclusive Final    POC Opiates 10/25/2023 Presumptive Positive (A)  Negative, Inconclusive Final    POC Oxycodone 10/25/2023 Negative  Negative, Inconclusive Final    POC Phencyclidine 10/25/2023 Negative  Negative, Inconclusive Final    POC Methylenedioxymethamphetamine * 10/25/2023 Negative  Negative, Inconclusive Final    POC Tricyclic Antidepressants 10/25/2023 Negative  Negative, Inconclusive Final    POC Buprenorphine 10/25/2023 Negative   Final     Acceptable 10/25/2023 Yes    Final    POC Temperature (Urine) 10/25/2023 92   Final   Office Visit on 08/15/2023   Component Date Value Ref Range Status    Sodium 08/15/2023 140  136 - 145 mmol/L Final    Potassium 08/15/2023 3.7  3.5 - 5.1 mmol/L Final    Chloride 08/15/2023 107  98 - 107 mmol/L Final    CO2 08/15/2023 27  21 - 32 mmol/L Final    Anion Gap 08/15/2023 10  7 - 16 mmol/L Final    Glucose 08/15/2023 84  74 - 106 mg/dL Final    BUN 08/15/2023 10  7 - 18 mg/dL Final    Creatinine 08/15/2023 1.00  0.55 - 1.02 mg/dL Final    BUN/Creatinine Ratio 08/15/2023 10  6 - 20 Final    Calcium 08/15/2023 8.9  8.5 - 10.1 mg/dL Final    eGFR 08/15/2023 61  >=60 mL/min/1.73m2 Final         No orders of the defined types were placed in this encounter.      Requested Prescriptions     Signed Prescriptions Disp Refills    HYDROcodone-acetaminophen (NORCO) 7.5-325 mg per tablet 30 tablet 0     Sig: Take 1 tablet by mouth once daily.    HYDROcodone-acetaminophen (NORCO) 7.5-325 mg per tablet 30 tablet 0     Sig: Take 1 tablet by mouth once daily.    HYDROcodone-acetaminophen (NORCO) 7.5-325 mg per tablet 30 tablet 0     Sig: Take 1 tablet by mouth once daily.       Assessment:     1. Lumbosacral radiculopathy    2. Disorder of sacrum         A's of Opioid Risk Assessment  Activity:Patient can perform ADL.   Analgesia:Patients pain is partially controlled by current medication. Patient has tried OTC medications such as Tylenol and Ibuprofen with out relief.   Adverse Effects: Patient denies constipation or sedation.  Aberrant Behavior:  reviewed with no aberrant drug seeking/taking behavior.  Overdose reversal drug naloxone discussed    Drug screen reviewed      Plan:      Last refill [place] September 23, 2023 Norco 7.5 Number 30 tablets      She had Bilateral lumbar L4 through S1 medial branch block # 1, April 13, 2023 she states she had 100% relief after procedure, the procedure did help improve her level of function      Daughter monitors her  mother's medication    The patient would like to continue current medications she states it does help with her discomfort    Continue home exercise program as directed    Continue current medication    Follow-up 3 months    Dr. Mendez, April 2024    Bring original prescription medication bottles/container/box with labels to each visit

## 2024-01-22 ENCOUNTER — OFFICE VISIT (OUTPATIENT)
Dept: PAIN MEDICINE | Facility: CLINIC | Age: 69
End: 2024-01-22
Payer: COMMERCIAL

## 2024-01-22 VITALS
HEART RATE: 67 BPM | DIASTOLIC BLOOD PRESSURE: 77 MMHG | WEIGHT: 180 LBS | BODY MASS INDEX: 30.73 KG/M2 | HEIGHT: 64 IN | SYSTOLIC BLOOD PRESSURE: 131 MMHG | RESPIRATION RATE: 20 BRPM

## 2024-01-22 DIAGNOSIS — M54.17 LUMBOSACRAL RADICULOPATHY: Primary | Chronic | ICD-10-CM

## 2024-01-22 DIAGNOSIS — Z79.899 ENCOUNTER FOR LONG-TERM (CURRENT) USE OF OTHER MEDICATIONS: ICD-10-CM

## 2024-01-22 DIAGNOSIS — M53.3 DISORDER OF SACRUM: Chronic | ICD-10-CM

## 2024-01-22 PROCEDURE — 3078F DIAST BP <80 MM HG: CPT | Mod: CPTII,,, | Performed by: PHYSICIAN ASSISTANT

## 2024-01-22 PROCEDURE — 1159F MED LIST DOCD IN RCRD: CPT | Mod: CPTII,,, | Performed by: PHYSICIAN ASSISTANT

## 2024-01-22 PROCEDURE — 3075F SYST BP GE 130 - 139MM HG: CPT | Mod: CPTII,,, | Performed by: PHYSICIAN ASSISTANT

## 2024-01-22 PROCEDURE — 3288F FALL RISK ASSESSMENT DOCD: CPT | Mod: CPTII,,, | Performed by: PHYSICIAN ASSISTANT

## 2024-01-22 PROCEDURE — 99999PBSHW POCT URINE DRUG SCREEN PRESUMP: Mod: PBBFAC,,,

## 2024-01-22 PROCEDURE — 80305 DRUG TEST PRSMV DIR OPT OBS: CPT | Mod: PBBFAC | Performed by: PHYSICIAN ASSISTANT

## 2024-01-22 PROCEDURE — 99214 OFFICE O/P EST MOD 30 MIN: CPT | Mod: PBBFAC | Performed by: PHYSICIAN ASSISTANT

## 2024-01-22 PROCEDURE — 99214 OFFICE O/P EST MOD 30 MIN: CPT | Mod: S$PBB,,, | Performed by: PHYSICIAN ASSISTANT

## 2024-01-22 PROCEDURE — 3008F BODY MASS INDEX DOCD: CPT | Mod: CPTII,,, | Performed by: PHYSICIAN ASSISTANT

## 2024-01-22 PROCEDURE — 1125F AMNT PAIN NOTED PAIN PRSNT: CPT | Mod: CPTII,,, | Performed by: PHYSICIAN ASSISTANT

## 2024-01-22 PROCEDURE — 1101F PT FALLS ASSESS-DOCD LE1/YR: CPT | Mod: CPTII,,, | Performed by: PHYSICIAN ASSISTANT

## 2024-01-22 RX ORDER — HYDROCODONE BITARTRATE AND ACETAMINOPHEN 7.5; 325 MG/1; MG/1
1 TABLET ORAL DAILY
Qty: 30 TABLET | Refills: 0 | Status: SHIPPED | OUTPATIENT
Start: 2024-02-21

## 2024-01-22 RX ORDER — HYDROCODONE BITARTRATE AND ACETAMINOPHEN 7.5; 325 MG/1; MG/1
1 TABLET ORAL DAILY
Qty: 30 TABLET | Refills: 0 | Status: SHIPPED | OUTPATIENT
Start: 2024-03-22

## 2024-01-22 RX ORDER — HYDROCODONE BITARTRATE AND ACETAMINOPHEN 7.5; 325 MG/1; MG/1
1 TABLET ORAL DAILY
Qty: 30 TABLET | Refills: 0 | Status: SHIPPED | OUTPATIENT
Start: 2024-01-22

## 2024-02-08 ENCOUNTER — ANESTHESIA EVENT (OUTPATIENT)
Dept: GASTROENTEROLOGY | Facility: HOSPITAL | Age: 69
End: 2024-02-08
Payer: COMMERCIAL

## 2024-02-08 ENCOUNTER — HOSPITAL ENCOUNTER (OUTPATIENT)
Dept: GASTROENTEROLOGY | Facility: HOSPITAL | Age: 69
Discharge: HOME | End: 2024-02-08
Attending: NURSE PRACTITIONER
Payer: COMMERCIAL

## 2024-02-08 ENCOUNTER — ANESTHESIA (OUTPATIENT)
Dept: GASTROENTEROLOGY | Facility: HOSPITAL | Age: 69
End: 2024-02-08
Payer: COMMERCIAL

## 2024-02-08 VITALS
SYSTOLIC BLOOD PRESSURE: 137 MMHG | HEART RATE: 54 BPM | RESPIRATION RATE: 14 BRPM | TEMPERATURE: 98 F | OXYGEN SATURATION: 99 % | DIASTOLIC BLOOD PRESSURE: 65 MMHG

## 2024-02-08 DIAGNOSIS — Z12.11 SCREENING FOR MALIGNANT NEOPLASM OF COLON: ICD-10-CM

## 2024-02-08 PROCEDURE — 63600175 PHARM REV CODE 636 W HCPCS: Performed by: NURSE ANESTHETIST, CERTIFIED REGISTERED

## 2024-02-08 PROCEDURE — D9220A PRA ANESTHESIA: Mod: ,,, | Performed by: NURSE ANESTHETIST, CERTIFIED REGISTERED

## 2024-02-08 PROCEDURE — 25000003 PHARM REV CODE 250: Performed by: NURSE ANESTHETIST, CERTIFIED REGISTERED

## 2024-02-08 PROCEDURE — G0121 COLON CA SCRN NOT HI RSK IND: HCPCS | Mod: ,,, | Performed by: INTERNAL MEDICINE

## 2024-02-08 PROCEDURE — G0121 COLON CA SCRN NOT HI RSK IND: HCPCS | Performed by: INTERNAL MEDICINE

## 2024-02-08 PROCEDURE — 37000009 HC ANESTHESIA EA ADD 15 MINS

## 2024-02-08 PROCEDURE — 37000008 HC ANESTHESIA 1ST 15 MINUTES

## 2024-02-08 RX ORDER — SODIUM CHLORIDE 0.9 % (FLUSH) 0.9 %
10 SYRINGE (ML) INJECTION EVERY 6 HOURS PRN
Status: CANCELLED | OUTPATIENT
Start: 2024-02-08

## 2024-02-08 RX ORDER — SODIUM CHLORIDE, SODIUM LACTATE, POTASSIUM CHLORIDE, CALCIUM CHLORIDE 600; 310; 30; 20 MG/100ML; MG/100ML; MG/100ML; MG/100ML
INJECTION, SOLUTION INTRAVENOUS CONTINUOUS
Status: CANCELLED | OUTPATIENT
Start: 2024-02-08

## 2024-02-08 RX ORDER — LIDOCAINE HYDROCHLORIDE 20 MG/ML
INJECTION, SOLUTION EPIDURAL; INFILTRATION; INTRACAUDAL; PERINEURAL
Status: DISCONTINUED | OUTPATIENT
Start: 2024-02-08 | End: 2024-02-08

## 2024-02-08 RX ORDER — PROPOFOL 10 MG/ML
INJECTION, EMULSION INTRAVENOUS
Status: DISCONTINUED | OUTPATIENT
Start: 2024-02-08 | End: 2024-02-08

## 2024-02-08 RX ADMIN — PROPOFOL 50 MG: 10 INJECTION, EMULSION INTRAVENOUS at 08:02

## 2024-02-08 RX ADMIN — PROPOFOL 100 MG: 10 INJECTION, EMULSION INTRAVENOUS at 08:02

## 2024-02-08 RX ADMIN — LIDOCAINE HYDROCHLORIDE 100 MG: 20 INJECTION, SOLUTION INTRAVENOUS at 08:02

## 2024-02-08 RX ADMIN — SODIUM CHLORIDE: 9 INJECTION, SOLUTION INTRAVENOUS at 08:02

## 2024-02-08 NOTE — ANESTHESIA POSTPROCEDURE EVALUATION
Anesthesia Post Evaluation    Patient: Xavier Rodriguez    Procedure(s) Performed: * No procedures listed *    Final Anesthesia Type: general      Patient location during evaluation: GI PACU  Patient participation: Yes- Able to Participate  Level of consciousness: awake and alert  Post-procedure vital signs: reviewed and stable  Pain management: adequate  Airway patency: patent    PONV status at discharge: No PONV  Anesthetic complications: no      Cardiovascular status: blood pressure returned to baseline  Respiratory status: unassisted  Hydration status: euvolemic  Follow-up not needed.              Vitals Value Taken Time   /61 02/08/24 0845   Temp 36.7 °C (98 °F) 02/08/24 0835   Pulse 49 02/08/24 0848   Resp 18 02/08/24 0848   SpO2 97 % 02/08/24 0839   Vitals shown include unvalidated device data.      No case tracking events are documented in the log.      Pain/Capri Score: Capri Score: 8 (2/8/2024  8:40 AM)

## 2024-02-08 NOTE — H&P
History & Physical - Short Stay  Gastroenterology      SUBJECTIVE:     Procedure: Colonoscopy    Chief Complaint/Indication for Procedure: Screening    (Not in a hospital admission)      Review of patient's allergies indicates:  No Known Allergies     Past Medical History:   Diagnosis Date    Chronic low back pain     Depression     Dysfunctional voiding of urine 11/10/2021    Patient states she does not urinate.  She was taken off of lasix which was the only thing that helped her empty. PVR 0 ml Chronic constipation, lifelong.  Has been out of Linzess for a month now. 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    INJECTION OF ANESTHETIC AGENT AROUND MEDIAL BRANCH NERVES INNERVATING LUMBAR FACET JOINT Bilateral 4/13/2023    Procedure: Bilateral L4-5,5-S1 MBB;  Surgeon: Ilsa Mendez MD;  Location: [facility];  Service: Pain Management;  Laterality: Bilateral;    TUMOR REMOVAL       Family History   Problem Relation Age of Onset    Heart disease Mother     Heart attack Mother     Heart disease Father         Heart attack    Heart attack Father      Social History     Tobacco Use    Smoking status: Former     Current packs/day: 0.00     Types: Cigarettes     Passive exposure: Past    Smokeless tobacco: Never    Tobacco comments:     Stop smoking about 15 years ago   Substance Use Topics    Alcohol use: Never    Drug use: Yes     Types: Hydrocodone         OBJECTIVE:     Vital Signs (Most Recent)  Pulse: 60 (02/08/24 0737)  Resp: 14 (02/08/24 0737)  BP: (!) 150/72 (02/08/24 0740)  SpO2: 99 % (02/08/24 0737)    Physical Exam:                                                       GENERAL:  Comfortable, in no acute distress.                                 HEENT EXAM:  Nonicteric.  No adenopathy.  Oropharynx is clear.                NECK:  Supple.                                                               LUNGS:  Clear.                                                               CARDIAC:  Regular rate and rhythm.  S1, S2.  No murmur.                      ABDOMEN:  Soft, positive bowel sounds, nontender.  No hepatosplenomegaly or masses.  No rebound or guarding.                                             EXTREMITIES:  No edema.     MENTAL STATUS:  Normal, alert and oriented.      ASSESSMENT/PLAN:     Assessment: Screening    Plan: Colonoscopy

## 2024-02-08 NOTE — DISCHARGE INSTRUCTIONS
Procedure Date  2/8/24     Impression  Overall Impression:   Hemorrhoids        Recommendation  - Repeat colonoscopy due to poor prep  - Two day prep with Miralax and Dulcolax then Golytely   - Discharge patient to home  - Advance diet as tolerated  - Continue present medications  - Await pathology results  - Patient has a contact number available for emergencies. The signs and symptoms of potential delayed complications were discussed with the patient. Return to normal activities tomorrow. Written discharge instructions were provided to the patient.     Indication  Screening for malignant neoplasm of colon    NO DRIVING, OPERATING EQUIPMENT, OR SIGNING LEGAL DOCUMENTS FOR 24 HOURS.

## 2024-02-08 NOTE — TRANSFER OF CARE
Anesthesia Transfer of Care Note    Patient: Xavier Rodriguez    Procedure(s) Performed: * No procedures listed *    Patient location: GI    Anesthesia Type: general    Transport from OR: Transported from OR on room air with adequate spontaneous ventilation. Continuous ECG monitoring in transport. Continuous SpO2 monitoring in transport    Post pain: adequate analgesia    Post assessment: no apparent anesthetic complications    Post vital signs: stable    Level of consciousness: lethargic    Nausea/Vomiting: no nausea/vomiting    Complications: none    Transfer of care protocol was followed      Last vitals: Visit Vitals  BP (!) 103/59   Pulse (!) 54   Temp 36.7 °C (98 °F) (Skin)   Resp 16   SpO2 98%

## 2024-02-13 DIAGNOSIS — K59.00 CONSTIPATION, UNSPECIFIED CONSTIPATION TYPE: ICD-10-CM

## 2024-02-13 RX ORDER — LUBIPROSTONE 24 UG/1
CAPSULE ORAL
Qty: 180 CAPSULE | Refills: 1 | Status: SHIPPED | OUTPATIENT
Start: 2024-02-13

## 2024-02-17 DIAGNOSIS — G89.4 CHRONIC PAIN SYNDROME: ICD-10-CM

## 2024-02-19 RX ORDER — NAPROXEN 500 MG/1
500 TABLET ORAL DAILY PRN
Qty: 30 TABLET | Refills: 0 | Status: SHIPPED | OUTPATIENT
Start: 2024-02-19

## 2024-03-11 DIAGNOSIS — Z12.11 SCREENING FOR COLON CANCER: Primary | ICD-10-CM

## 2024-03-12 RX ORDER — POLYETHYLENE GLYCOL 3350, SODIUM SULFATE ANHYDROUS, SODIUM BICARBONATE, SODIUM CHLORIDE, POTASSIUM CHLORIDE 236; 22.74; 6.74; 5.86; 2.97 G/4L; G/4L; G/4L; G/4L; G/4L
4 POWDER, FOR SOLUTION ORAL ONCE
Qty: 4000 ML | Refills: 0 | Status: SHIPPED | OUTPATIENT
Start: 2024-03-12 | End: 2024-03-12

## 2024-03-18 ENCOUNTER — ANESTHESIA EVENT (OUTPATIENT)
Dept: GASTROENTEROLOGY | Facility: HOSPITAL | Age: 69
End: 2024-03-18
Payer: COMMERCIAL

## 2024-03-18 ENCOUNTER — ANESTHESIA (OUTPATIENT)
Dept: GASTROENTEROLOGY | Facility: HOSPITAL | Age: 69
End: 2024-03-18
Payer: COMMERCIAL

## 2024-03-18 ENCOUNTER — HOSPITAL ENCOUNTER (OUTPATIENT)
Dept: GASTROENTEROLOGY | Facility: HOSPITAL | Age: 69
Discharge: HOME | End: 2024-03-18
Attending: INTERNAL MEDICINE | Admitting: INTERNAL MEDICINE
Payer: COMMERCIAL

## 2024-03-18 VITALS
TEMPERATURE: 97 F | RESPIRATION RATE: 15 BRPM | HEART RATE: 52 BPM | SYSTOLIC BLOOD PRESSURE: 136 MMHG | DIASTOLIC BLOOD PRESSURE: 75 MMHG | OXYGEN SATURATION: 98 %

## 2024-03-18 DIAGNOSIS — Z12.11 SCREENING FOR MALIGNANT NEOPLASM OF COLON: ICD-10-CM

## 2024-03-18 PROCEDURE — 63600175 PHARM REV CODE 636 W HCPCS: Performed by: NURSE ANESTHETIST, CERTIFIED REGISTERED

## 2024-03-18 PROCEDURE — 88341 IMHCHEM/IMCYTCHM EA ADD ANTB: CPT | Mod: 26,,, | Performed by: PATHOLOGY

## 2024-03-18 PROCEDURE — 88305 TISSUE EXAM BY PATHOLOGIST: CPT | Mod: 26,,, | Performed by: PATHOLOGY

## 2024-03-18 PROCEDURE — 88342 IMHCHEM/IMCYTCHM 1ST ANTB: CPT | Mod: TC,SUR | Performed by: INTERNAL MEDICINE

## 2024-03-18 PROCEDURE — 45380 COLONOSCOPY AND BIOPSY: CPT | Mod: PT | Performed by: INTERNAL MEDICINE

## 2024-03-18 PROCEDURE — 37000009 HC ANESTHESIA EA ADD 15 MINS

## 2024-03-18 PROCEDURE — 88342 IMHCHEM/IMCYTCHM 1ST ANTB: CPT | Mod: 26,,, | Performed by: PATHOLOGY

## 2024-03-18 PROCEDURE — 27201423 OPTIME MED/SURG SUP & DEVICES STERILE SUPPLY

## 2024-03-18 PROCEDURE — 45380 COLONOSCOPY AND BIOPSY: CPT | Mod: PT,,, | Performed by: INTERNAL MEDICINE

## 2024-03-18 PROCEDURE — 37000008 HC ANESTHESIA 1ST 15 MINUTES

## 2024-03-18 PROCEDURE — 25000003 PHARM REV CODE 250: Performed by: NURSE ANESTHETIST, CERTIFIED REGISTERED

## 2024-03-18 PROCEDURE — D9220A PRA ANESTHESIA: Mod: PT,,, | Performed by: NURSE ANESTHETIST, CERTIFIED REGISTERED

## 2024-03-18 PROCEDURE — 27000284 HC CANNULA NASAL: Performed by: NURSE ANESTHETIST, CERTIFIED REGISTERED

## 2024-03-18 RX ORDER — SODIUM CHLORIDE, SODIUM LACTATE, POTASSIUM CHLORIDE, CALCIUM CHLORIDE 600; 310; 30; 20 MG/100ML; MG/100ML; MG/100ML; MG/100ML
INJECTION, SOLUTION INTRAVENOUS CONTINUOUS
Status: DISCONTINUED | OUTPATIENT
Start: 2024-03-18 | End: 2024-03-19 | Stop reason: HOSPADM

## 2024-03-18 RX ORDER — PROPOFOL 10 MG/ML
VIAL (ML) INTRAVENOUS
Status: DISCONTINUED | OUTPATIENT
Start: 2024-03-18 | End: 2024-03-18

## 2024-03-18 RX ORDER — SODIUM CHLORIDE 0.9 % (FLUSH) 0.9 %
10 SYRINGE (ML) INJECTION EVERY 6 HOURS PRN
Status: DISCONTINUED | OUTPATIENT
Start: 2024-03-18 | End: 2024-03-19 | Stop reason: HOSPADM

## 2024-03-18 RX ORDER — LIDOCAINE HYDROCHLORIDE 20 MG/ML
INJECTION, SOLUTION EPIDURAL; INFILTRATION; INTRACAUDAL; PERINEURAL
Status: DISCONTINUED | OUTPATIENT
Start: 2024-03-18 | End: 2024-03-18

## 2024-03-18 RX ORDER — SODIUM CHLORIDE 9 MG/ML
INJECTION, SOLUTION INTRAVENOUS CONTINUOUS PRN
Status: DISCONTINUED | OUTPATIENT
Start: 2024-03-18 | End: 2024-03-18

## 2024-03-18 RX ADMIN — PROPOFOL 50 MG: 10 INJECTION, EMULSION INTRAVENOUS at 11:03

## 2024-03-18 RX ADMIN — LIDOCAINE HYDROCHLORIDE 50 MG: 20 INJECTION, SOLUTION INTRAVENOUS at 10:03

## 2024-03-18 RX ADMIN — PROPOFOL 50 MG: 10 INJECTION, EMULSION INTRAVENOUS at 10:03

## 2024-03-18 RX ADMIN — SODIUM CHLORIDE: 9 INJECTION, SOLUTION INTRAVENOUS at 10:03

## 2024-03-18 NOTE — H&P
History & Physical - Short Stay  Gastroenterology      SUBJECTIVE:     Procedure: Colonoscopy    Chief Complaint/Indication for Procedure: Screening    (Not in a hospital admission)      Review of patient's allergies indicates:  No Known Allergies     Past Medical History:   Diagnosis Date    Chronic low back pain     Depression     Dysfunctional voiding of urine 11/10/2021    Patient states she does not urinate.  She was taken off of lasix which was the only thing that helped her empty. PVR 0 ml Chronic constipation, lifelong.  Has been out of Linzess for a month now. 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    INJECTION OF ANESTHETIC AGENT AROUND MEDIAL BRANCH NERVES INNERVATING LUMBAR FACET JOINT Bilateral 4/13/2023    Procedure: Bilateral L4-5,5-S1 MBB;  Surgeon: Ilsa Mendez MD;  Location: [facility];  Service: Pain Management;  Laterality: Bilateral;    TUMOR REMOVAL       Family History   Problem Relation Age of Onset    Heart disease Mother     Heart attack Mother     Heart disease Father         Heart attack    Heart attack Father      Social History     Tobacco Use    Smoking status: Former     Current packs/day: 0.00     Types: Cigarettes     Passive exposure: Past    Smokeless tobacco: Never    Tobacco comments:     Stop smoking about 15 years ago   Substance Use Topics    Alcohol use: Never    Drug use: Yes     Types: Hydrocodone         OBJECTIVE:     Vital Signs (Most Recent)  Pulse: (!) 51 (03/18/24 1050)  Resp: 11 (03/18/24 1050)  BP: (!) 144/73 (03/18/24 1050)  SpO2: 99 % (03/18/24 1050)    Physical Exam:                                                       GENERAL:  Comfortable, in no acute distress.                                 HEENT EXAM:  Nonicteric.  No adenopathy.  Oropharynx is  clear.               NECK:  Supple.                                                               LUNGS:  Clear.                                                               CARDIAC:  Regular rate and rhythm.  S1, S2.  No murmur.                      ABDOMEN:  Soft, positive bowel sounds, nontender.  No hepatosplenomegaly or masses.  No rebound or guarding.                                             EXTREMITIES:  No edema.     MENTAL STATUS:  Normal, alert and oriented.      ASSESSMENT/PLAN:     Assessment: Screening    Plan: Colonoscopy

## 2024-03-18 NOTE — ANESTHESIA POSTPROCEDURE EVALUATION
Anesthesia Post Evaluation    Patient: Xavier Rodriguez    Procedure(s) Performed: * No procedures listed *    Final Anesthesia Type: general      Patient location during evaluation: GI PACU  Patient participation: Yes- Able to Participate  Level of consciousness: awake and alert  Post-procedure vital signs: reviewed and stable  Pain management: adequate  Airway patency: patent  BRENDEN mitigation strategies: Multimodal analgesia  PONV status at discharge: No PONV  Anesthetic complications: no      Cardiovascular status: blood pressure returned to baseline  Respiratory status: unassisted  Hydration status: euvolemic  Follow-up not needed.  Comments: Refer to nursing note for pain/nazia score upon discharge from recovery.              Vitals Value Taken Time   /75 03/18/24 1152   Temp 36.1 °C (97 °F) 03/18/24 1120   Pulse 52 03/18/24 1153   Resp 19 03/18/24 1153   SpO2 99 % 03/18/24 1153   Vitals shown include unvalidated device data.      No case tracking events are documented in the log.      Pain/Nazia Score: Nazia Score: 9 (3/18/2024 11:24 AM)

## 2024-03-18 NOTE — DISCHARGE INSTRUCTIONS
Procedure Date  3/18/24     Impression  Overall Impression:   Skin tag  Hemorrhoids  Multiple ulcers in the rectum; performed cold forceps biopsy     Recommendation  - Repeat colonoscopy in 10 years  - Discharge patient to home  - Advance diet as tolerated  - Continue present medications  - Await pathology results  - Patient has a contact number available for emergencies. The signs and symptoms of potential delayed complications were discussed with the patient. Return to normal activities tomorrow. Written discharge instructions were provided to the patient.     No driving today, no operating heavy machinery, no signing any legal documents until tomorrow.    Drink lots of fluids, resume regular diet.  Take your normal medications.     Please call our office for any nausea, vomiting or abdominal pain.

## 2024-03-18 NOTE — ANESTHESIA PREPROCEDURE EVALUATION
03/18/2024  Xavier Rodriguez is a 68 y.o., female.      Pre-op Assessment    I have reviewed the Patient Summary Reports.     I have reviewed the Nursing Notes. I have reviewed the NPO Status.   I have reviewed the Medications.     Review of Systems  Anesthesia Hx:  No problems with previous Anesthesia             Family Hx of Anesthesia complications:   Personal Hx of Anesthesia complications                    Social:  Non-Smoker       Hematology/Oncology:  Hematology Normal   Oncology Normal                                   EENT/Dental:  EENT/Dental Normal           Cardiovascular:     Hypertension           hyperlipidemia                             Pulmonary:  Pulmonary Normal                       Renal/:  Renal/ Normal                 Hepatic/GI:  Hepatic/GI Normal                 Musculoskeletal:  Arthritis               Neurological:    Neuromuscular Disease,                                   Endocrine:  Endocrine Normal            Dermatological:  Skin Normal    Psych:  Psychiatric History  depression                Physical Exam  General: Well nourished, Cooperative, Alert and Oriented    Airway:  Mallampati: II   Mouth Opening: Normal  TM Distance: Normal  Tongue: Normal  Neck ROM: Normal ROM    Chest/Lungs:  Clear to auscultation, Normal Respiratory Rate    Heart:  Rate: Normal  Rhythm: Regular Rhythm    Abdomen:  Normal, Soft        Anesthesia Plan  Type of Anesthesia, risks & benefits discussed:    Anesthesia Type: Gen Natural Airway  Intra-op Monitoring Plan: Standard ASA Monitors  Post Op Pain Control Plan: multimodal analgesia  Induction:  IV  Informed Consent: Informed consent signed with the Patient and all parties understand the risks and agree with anesthesia plan.  All questions answered. Patient consented to blood products? Yes  ASA Score: 3    Ready For Surgery From Anesthesia  Perspective.     .

## 2024-03-18 NOTE — TRANSFER OF CARE
Anesthesia Transfer of Care Note    Patient: Xavier Rodriguez    Procedure(s) Performed: * No procedures listed *    Patient location: GI    Anesthesia Type: general    Transport from OR: Transported from OR on room air with adequate spontaneous ventilation. Continuous ECG monitoring in transport. Continuous SpO2 monitoring in transport    Post pain: adequate analgesia    Post assessment: no apparent anesthetic complications    Post vital signs: stable    Level of consciousness: responds to stimulation    Nausea/Vomiting: no nausea/vomiting    Complications: none    Transfer of care protocol was followed      Last vitals: Visit Vitals  BP (!) 88/54   Pulse (!) 55   Temp 36.1 °C (97 °F)   Resp 16   SpO2 100%

## 2024-03-19 LAB
ESTROGEN SERPL-MCNC: NORMAL PG/ML
INSULIN SERPL-ACNC: NORMAL U[IU]/ML
LAB AP GROSS DESCRIPTION: NORMAL
LAB AP LABORATORY NOTES: NORMAL
T3RU NFR SERPL: NORMAL %

## 2024-04-22 ENCOUNTER — OFFICE VISIT (OUTPATIENT)
Dept: FAMILY MEDICINE | Facility: CLINIC | Age: 69
End: 2024-04-22
Payer: COMMERCIAL

## 2024-04-22 VITALS
BODY MASS INDEX: 29.57 KG/M2 | WEIGHT: 173.19 LBS | RESPIRATION RATE: 18 BRPM | OXYGEN SATURATION: 97 % | HEART RATE: 64 BPM | SYSTOLIC BLOOD PRESSURE: 151 MMHG | TEMPERATURE: 97 F | HEIGHT: 64 IN | DIASTOLIC BLOOD PRESSURE: 83 MMHG

## 2024-04-22 DIAGNOSIS — K59.00 CONSTIPATION, UNSPECIFIED CONSTIPATION TYPE: ICD-10-CM

## 2024-04-22 DIAGNOSIS — F03.90 DEMENTIA, UNSPECIFIED DEMENTIA SEVERITY, UNSPECIFIED DEMENTIA TYPE, UNSPECIFIED WHETHER BEHAVIORAL, PSYCHOTIC, OR MOOD DISTURBANCE OR ANXIETY: ICD-10-CM

## 2024-04-22 DIAGNOSIS — R51.9 NONINTRACTABLE HEADACHE, UNSPECIFIED CHRONICITY PATTERN, UNSPECIFIED HEADACHE TYPE: ICD-10-CM

## 2024-04-22 DIAGNOSIS — R30.0 DYSURIA: ICD-10-CM

## 2024-04-22 DIAGNOSIS — M54.50 LOW BACK PAIN, UNSPECIFIED BACK PAIN LATERALITY, UNSPECIFIED CHRONICITY, UNSPECIFIED WHETHER SCIATICA PRESENT: Primary | ICD-10-CM

## 2024-04-22 LAB
BILIRUB SERPL-MCNC: NORMAL MG/DL
BILIRUB UR QL STRIP: NEGATIVE
BLOOD URINE, POC: NORMAL
CLARITY UR: CLEAR
COLOR UR: YELLOW
COLOR, POC UA: NORMAL
GLUCOSE UR QL STRIP: NORMAL
GLUCOSE UR STRIP-MCNC: NORMAL MG/DL
KETONES UR QL STRIP: NORMAL
KETONES UR STRIP-SCNC: NEGATIVE MG/DL
LEUKOCYTE ESTERASE UR QL STRIP: ABNORMAL
LEUKOCYTE ESTERASE URINE, POC: NORMAL
MUCOUS, UA: ABNORMAL /LPF
NITRITE UR QL STRIP: NEGATIVE
NITRITE, POC UA: NORMAL
PH UR STRIP: 5.5 PH UNITS
PH, POC UA: 5.5
PROT UR QL STRIP: 10
PROTEIN, POC: NORMAL
RBC # UR STRIP: NEGATIVE /UL
RBC #/AREA URNS HPF: 2 /HPF
SP GR UR STRIP: 1.02
SPECIFIC GRAVITY, POC UA: 1.02
SQUAMOUS #/AREA URNS LPF: ABNORMAL /HPF
UROBILINOGEN UR STRIP-ACNC: 2 MG/DL
UROBILINOGEN, POC UA: 1
WBC #/AREA URNS HPF: 2 /HPF

## 2024-04-22 PROCEDURE — 1159F MED LIST DOCD IN RCRD: CPT | Mod: ,,, | Performed by: NURSE PRACTITIONER

## 2024-04-22 PROCEDURE — 3008F BODY MASS INDEX DOCD: CPT | Mod: ,,, | Performed by: NURSE PRACTITIONER

## 2024-04-22 PROCEDURE — 81003 URINALYSIS AUTO W/O SCOPE: CPT | Mod: QW,,, | Performed by: NURSE PRACTITIONER

## 2024-04-22 PROCEDURE — 87077 CULTURE AEROBIC IDENTIFY: CPT | Mod: ,,, | Performed by: CLINICAL MEDICAL LABORATORY

## 2024-04-22 PROCEDURE — 3288F FALL RISK ASSESSMENT DOCD: CPT | Mod: ,,, | Performed by: NURSE PRACTITIONER

## 2024-04-22 PROCEDURE — 81001 URINALYSIS AUTO W/SCOPE: CPT | Mod: ,,, | Performed by: CLINICAL MEDICAL LABORATORY

## 2024-04-22 PROCEDURE — 1125F AMNT PAIN NOTED PAIN PRSNT: CPT | Mod: ,,, | Performed by: NURSE PRACTITIONER

## 2024-04-22 PROCEDURE — 1101F PT FALLS ASSESS-DOCD LE1/YR: CPT | Mod: ,,, | Performed by: NURSE PRACTITIONER

## 2024-04-22 PROCEDURE — 99213 OFFICE O/P EST LOW 20 MIN: CPT | Mod: ,,, | Performed by: NURSE PRACTITIONER

## 2024-04-22 PROCEDURE — 3077F SYST BP >= 140 MM HG: CPT | Mod: ,,, | Performed by: NURSE PRACTITIONER

## 2024-04-22 PROCEDURE — 3079F DIAST BP 80-89 MM HG: CPT | Mod: ,,, | Performed by: NURSE PRACTITIONER

## 2024-04-22 PROCEDURE — 87086 URINE CULTURE/COLONY COUNT: CPT | Mod: ,,, | Performed by: CLINICAL MEDICAL LABORATORY

## 2024-04-22 NOTE — PROGRESS NOTES
"Clinic Note    Xavier Rodriguez is a 68 y.o. female     Chief Complaint:   Chief Complaint   Patient presents with    Follow-up     4 month.     Back Pain     Lower back.     Headache     " Still having these headaches. "    Constipation     " Hard to go to bathroom to do number 2. "         Subjective:    Patient comes in today alone. Patient states she has a  but no family present. Patient is poor historian.  Patient does not bring medications today.   Patient complains of chronic constipation. States she has to use laxatives but still does not work well. There are laxatives on med list but unsure what she is taking. Patient has had recent scope.   Patient complains of lower back pain. admits to difficulty urinating. States she has not seen urology in a long time.  Patient complains of headaches. States headache are improved but still  having at least twice a week. Patient oriented X 1.         Allergies:   Review of patient's allergies indicates:  No Known Allergies     Past Medical History:  Past Medical History:   Diagnosis Date    Chronic low back pain     Depression     Dysfunctional voiding of urine 11/10/2021    Patient states she does not urinate.  She was taken off of lasix which was the only thing that helped her empty. PVR 0 ml Chronic constipation, lifelong.  Has been out of [facility] for a month now. Renal function WNL when last checked in May    Edema     Hyperlipidemia     Hypertension     IBS (irritable bowel syndrome)         Current Medications:    Current Outpatient Medications:     amitriptyline (ELAVIL) 25 MG tablet, Take 25 mg by mouth., Disp: , Rfl:     furosemide (LASIX) 20 MG tablet, TAKE 1 TABLET BY MOUTH EVERY DAY AS NEEDED, Disp: 30 tablet, Rfl: 0    HYDROcodone-acetaminophen (NORCO) 7.5-325 mg per tablet, Take 1 tablet by mouth once daily., Disp: 30 tablet, Rfl: 0    HYDROcodone-acetaminophen (NORCO) 7.5-325 mg per tablet, Take 1 tablet by mouth once daily., Disp: 30 tablet, Rfl: 0    " "HYDROcodone-acetaminophen (NORCO) 7.5-325 mg per tablet, Take 1 tablet by mouth once daily., Disp: 30 tablet, Rfl: 0    linaCLOtide (LINZESS) 145 mcg Cap capsule, Take 1 capsule by mouth., Disp: , Rfl:     lovastatin (MEVACOR) 20 MG tablet, TAKE 1 TABLET BY MOUTH EVERY DAY IN THE EVENING, Disp: 90 tablet, Rfl: 1    lubiprostone (AMITIZA) 24 MCG Cap, TAKE 1 CAPSULE BY MOUTH 2 TIMES DAILY WITH MEALS., Disp: 180 capsule, Rfl: 1    naloxegoL (MOVANTIK) 25 mg tablet, Take 25 mg by mouth once daily., Disp: 90 tablet, Rfl: 1    naproxen (NAPROSYN) 500 MG tablet, TAKE 1 TABLET BY MOUTH EVERY DAY AS NEEDED, Disp: 30 tablet, Rfl: 0    potassium chloride SA (K-DUR,KLOR-CON) 20 MEQ tablet, Take 1 tablet (20 mEq total) by mouth once daily., Disp: 90 tablet, Rfl: 1    tamsulosin (FLOMAX) 0.4 mg Cap, Take 1 capsule (0.4 mg total) by mouth once daily., Disp: 90 capsule, Rfl: 1    topiramate 25 mg capsule, Take 25 mg by mouth., Disp: , Rfl:        Review of Systems   Constitutional:  Negative for fever.   Respiratory:  Negative for cough and shortness of breath.    Cardiovascular:  Negative for chest pain.   Gastrointestinal:  Positive for constipation. Negative for abdominal pain, diarrhea, nausea and vomiting.   Genitourinary:  Positive for dysuria, flank pain and frequency.   Musculoskeletal:  Positive for back pain.   Neurological:  Positive for headaches.   Psychiatric/Behavioral:  Positive for confusion.           Objective:    BP (!) 151/83 (BP Location: Left arm, Patient Position: Sitting, BP Method: Medium (Automatic))   Pulse 64   Temp 97.4 °F (36.3 °C) (Oral)   Resp 18   Ht 5' 4" (1.626 m)   Wt 78.6 kg (173 lb 3.2 oz)   SpO2 97%   BMI 29.73 kg/m²      Physical Exam  Constitutional:       Appearance: Normal appearance.   Eyes:      Extraocular Movements: Extraocular movements intact.   Cardiovascular:      Rate and Rhythm: Normal rate and regular rhythm.      Pulses: Normal pulses.      Heart sounds: Normal heart " sounds.   Pulmonary:      Effort: Pulmonary effort is normal.      Breath sounds: Normal breath sounds.   Abdominal:      Palpations: Abdomen is soft.      Tenderness: There is no abdominal tenderness. There is no right CVA tenderness, left CVA tenderness, guarding or rebound.   Musculoskeletal:      Right lower leg: No edema.      Left lower leg: No edema.      Comments: cane   Neurological:      Mental Status: She is alert. She is disoriented.      Gait: Gait abnormal.      Comments: Oriented to person          Assessment and Plan:    1. Low back pain, unspecified back pain laterality, unspecified chronicity, unspecified whether sciatica present    2. Dysuria    3. Nonintractable headache, unspecified chronicity pattern, unspecified headache type    4. Dementia, unspecified dementia severity, unspecified dementia type, unspecified whether behavioral, psychotic, or mood disturbance or anxiety         Low back pain, unspecified back pain laterality, unspecified chronicity, unspecified whether sciatica present  -f/u with pain treatment as scheduled    Dysuria  -     POCT URINALYSIS W/O SCOPE  -     Urinalysis; Future; Expected date: 04/22/2024  -     Urine Culture High Risk; Future; Expected date: 04/22/2024  -     Urinalysis, Microscopic  -will rule out uti. Patient has chronic problems with voiding. Patient is established with urology. Recommend f/u if symptoms persist and urine negative    Nonintractable headache, unspecified chronicity pattern, unspecified headache type  -     Ambulatory referral/consult to Neurology; Future; Expected date: 04/29/2024    Dementia, unspecified dementia severity, unspecified dementia type, unspecified whether behavioral, psychotic, or mood disturbance or anxiety  -     Ambulatory referral/consult to Neurology; Future; Expected date: 04/29/2024  -refer for eval of dementia and persistent headaches per patient. Poor historian    -chronic constipation. Recently seen by GI.    -encouraged patient to always bring med and family member to appointment    There are no Patient Instructions on file for this visit.   Follow up in about 4 weeks (around 5/20/2024), or if symptoms worsen or fail to improve.

## 2024-04-24 ENCOUNTER — TELEPHONE (OUTPATIENT)
Dept: FAMILY MEDICINE | Facility: CLINIC | Age: 69
End: 2024-04-24
Payer: COMMERCIAL

## 2024-04-24 LAB — UA COMPLETE W REFLEX CULTURE PNL UR: ABNORMAL

## 2024-04-24 NOTE — TELEPHONE ENCOUNTER
----- Message from TEENA Ross sent at 4/24/2024  9:57 AM CDT -----  Final culture with low bacteria. No antibiotics needed

## 2024-04-24 NOTE — TELEPHONE ENCOUNTER
Call made to pt. Pt daughter joleen answered phone and  verified pt . Informed of lab results. Voiced understanding and thanked me for the call back.

## 2024-05-09 DIAGNOSIS — Z71.89 COMPLEX CARE COORDINATION: ICD-10-CM

## 2024-06-06 ENCOUNTER — OFFICE VISIT (OUTPATIENT)
Dept: NEUROLOGY | Facility: CLINIC | Age: 69
End: 2024-06-06
Payer: COMMERCIAL

## 2024-06-06 VITALS
DIASTOLIC BLOOD PRESSURE: 74 MMHG | HEART RATE: 65 BPM | HEIGHT: 64 IN | BODY MASS INDEX: 30.73 KG/M2 | SYSTOLIC BLOOD PRESSURE: 156 MMHG | WEIGHT: 180 LBS | OXYGEN SATURATION: 99 %

## 2024-06-06 DIAGNOSIS — E55.9 VITAMIN D DEFICIENCY: ICD-10-CM

## 2024-06-06 DIAGNOSIS — F03.90 DEMENTIA, UNSPECIFIED DEMENTIA SEVERITY, UNSPECIFIED DEMENTIA TYPE, UNSPECIFIED WHETHER BEHAVIORAL, PSYCHOTIC, OR MOOD DISTURBANCE OR ANXIETY: ICD-10-CM

## 2024-06-06 DIAGNOSIS — R51.9 NONINTRACTABLE HEADACHE, UNSPECIFIED CHRONICITY PATTERN, UNSPECIFIED HEADACHE TYPE: Primary | ICD-10-CM

## 2024-06-06 DIAGNOSIS — E03.9 HYPOTHYROIDISM, UNSPECIFIED TYPE: ICD-10-CM

## 2024-06-06 DIAGNOSIS — E53.8 VITAMIN B12 DEFICIENCY: ICD-10-CM

## 2024-06-06 PROCEDURE — 3078F DIAST BP <80 MM HG: CPT | Mod: CPTII,,, | Performed by: NURSE PRACTITIONER

## 2024-06-06 PROCEDURE — 1101F PT FALLS ASSESS-DOCD LE1/YR: CPT | Mod: CPTII,,, | Performed by: NURSE PRACTITIONER

## 2024-06-06 PROCEDURE — 3288F FALL RISK ASSESSMENT DOCD: CPT | Mod: CPTII,,, | Performed by: NURSE PRACTITIONER

## 2024-06-06 PROCEDURE — 99215 OFFICE O/P EST HI 40 MIN: CPT | Mod: PBBFAC | Performed by: NURSE PRACTITIONER

## 2024-06-06 PROCEDURE — 1159F MED LIST DOCD IN RCRD: CPT | Mod: CPTII,,, | Performed by: NURSE PRACTITIONER

## 2024-06-06 PROCEDURE — 99203 OFFICE O/P NEW LOW 30 MIN: CPT | Mod: S$PBB,,, | Performed by: NURSE PRACTITIONER

## 2024-06-06 PROCEDURE — 3008F BODY MASS INDEX DOCD: CPT | Mod: CPTII,,, | Performed by: NURSE PRACTITIONER

## 2024-06-06 PROCEDURE — 1126F AMNT PAIN NOTED NONE PRSNT: CPT | Mod: CPTII,,, | Performed by: NURSE PRACTITIONER

## 2024-06-06 PROCEDURE — 3077F SYST BP >= 140 MM HG: CPT | Mod: CPTII,,, | Performed by: NURSE PRACTITIONER

## 2024-06-06 PROCEDURE — 1160F RVW MEDS BY RX/DR IN RCRD: CPT | Mod: CPTII,,, | Performed by: NURSE PRACTITIONER

## 2024-06-06 NOTE — PATIENT INSTRUCTIONS
Sleep study  MRI brain  Labs as ordered  Recommend evaluation with eye doctor for up to date evaluation  Hold on medications pending workup

## 2024-06-06 NOTE — PROGRESS NOTES
Subjective:       Patient ID: Xavier Rodriguez is a 68 y.o. female     Chief Complaint:    Chief Complaint   Patient presents with    Migraine     New Patient.        Allergies:  Patient has no known allergies.    Current Medications:    Outpatient Encounter Medications as of 6/6/2024   Medication Sig Dispense Refill    furosemide (LASIX) 20 MG tablet TAKE 1 TABLET BY MOUTH EVERY DAY AS NEEDED 30 tablet 0    HYDROcodone-acetaminophen (NORCO) 7.5-325 mg per tablet Take 1 tablet by mouth once daily. 30 tablet 0    HYDROcodone-acetaminophen (NORCO) 7.5-325 mg per tablet Take 1 tablet by mouth once daily. 30 tablet 0    HYDROcodone-acetaminophen (NORCO) 7.5-325 mg per tablet Take 1 tablet by mouth once daily. 30 tablet 0    linaCLOtide (LINZESS) 145 mcg Cap capsule Take 1 capsule by mouth.      naproxen (NAPROSYN) 500 MG tablet TAKE 1 TABLET BY MOUTH EVERY DAY AS NEEDED 30 tablet 0    potassium chloride SA (K-DUR,KLOR-CON) 20 MEQ tablet Take 1 tablet (20 mEq total) by mouth once daily. 90 tablet 1    tamsulosin (FLOMAX) 0.4 mg Cap Take 1 capsule (0.4 mg total) by mouth once daily. 90 capsule 1    [DISCONTINUED] amitriptyline (ELAVIL) 25 MG tablet Take 25 mg by mouth.      lovastatin (MEVACOR) 20 MG tablet TAKE 1 TABLET BY MOUTH EVERY DAY IN THE EVENING (Patient not taking: Reported on 6/6/2024) 90 tablet 1    lubiprostone (AMITIZA) 24 MCG Cap TAKE 1 CAPSULE BY MOUTH 2 TIMES DAILY WITH MEALS. (Patient not taking: Reported on 6/6/2024) 180 capsule 1    naloxegoL (MOVANTIK) 25 mg tablet Take 25 mg by mouth once daily. (Patient not taking: Reported on 6/6/2024) 90 tablet 1    [DISCONTINUED] topiramate 25 mg capsule Take 25 mg by mouth. (Patient not taking: Reported on 6/6/2024)       No facility-administered encounter medications on file as of 6/6/2024.       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    Reports headaches have been long standing, many years, though increased frequency over the last 6 months.  As much as 15 headache days per month.  She has prior been treated with elavil and topamax, though she does not recall that they were of benefit.  She does report symptoms of frequent waking with headaches, she also has c/o memory impairment per daughter, short term, that has also been worsening over some time as well.  Her MMSE here in clinic is quite low at 11/30, she is not driving.    She is on daily norco tablets for chronic pain, so rebound headache cannot be ignored, and cognition secondary to the opiates is possible as well.    Given history of prior tumor need updated head imaging.  Dementia panel labs as well.    She has not seen eye doctor in quite some time, is supposed to wear glasses too but lost them some time back, so another potential contributor to headaches and I encouraged her to follow-up with them.               Review of Systems  Review of Systems   Constitutional:  Negative for diaphoresis and fever.   HENT:  Negative for congestion, hearing loss and tinnitus.    Eyes:  Negative for blurred vision, double vision, photophobia, discharge and redness.   Respiratory:  Negative for cough and shortness of breath.    Cardiovascular:  Negative for chest pain.   Gastrointestinal:  Negative for abdominal pain, nausea and vomiting.   Musculoskeletal:  Negative for back pain, joint pain, myalgias and neck pain.   Skin:  Negative for itching and rash.   Neurological:  Positive for headaches. Negative for dizziness, tremors, sensory change, speech change, focal weakness, seizures, loss of consciousness and weakness.   Psychiatric/Behavioral:  Positive for memory loss. Negative for depression and hallucinations. The patient does not have insomnia.    All other systems reviewed and are negative.     Objective:     NEUROLOGICAL  EXAMINATION:     MENTAL STATUS   Oriented to person, place, and time.   Attention: normal. Concentration: normal.   Speech: speech is normal   Level of consciousness: alert  Knowledge: good and consistent with education.   Normal comprehension.     CRANIAL NERVES     CN II   Visual fields full to confrontation.   Visual acuity: normal  Right visual field deficit: none  Left visual field deficit: none     CN III, IV, VI   Pupils are equal, round, and reactive to light.  Extraocular motions are normal.   Right pupil: Size: 3 mm. Shape: regular. Reactivity: brisk. Consensual response: intact. Accommodation: intact.   Left pupil: Size: 3 mm. Shape: regular. Reactivity: brisk. Consensual response: intact. Accommodation: intact.   CN III: no CN III palsy  CN VI: no CN VI palsy  Nystagmus: none   Diplopia: none  Upgaze: normal  Downgaze: normal  Conjugate gaze: present  Vestibulo-ocular reflex: present    CN V   Facial sensation intact.   Right facial sensation deficit: none  Left facial sensation deficit: none  Right corneal reflex: normal  Left corneal reflex: normal    CN VII   Facial expression full, symmetric.   Right facial weakness: none  Left facial weakness: none  Right taste: normal  Left taste: normal    CN VIII   CN VIII normal.   Hearing: intact    CN IX, X   CN IX normal.   CN X normal.   Palate: symmetric    CN XI   CN XI normal.   Right sternocleidomastoid strength: normal  Left sternocleidomastoid strength: normal  Right trapezius strength: normal  Left trapezius strength: normal    CN XII   CN XII normal.   Tongue: not atrophic  Fasciculations: absent  Tongue deviation: none    MOTOR EXAM   Muscle bulk: normal  Overall muscle tone: normal  Right arm tone: normal  Left arm tone: normal  Right arm pronator drift: absent  Left arm pronator drift: absent  Right leg tone: normal  Left leg tone: normal    Strength   Right neck flexion: 5/5  Left neck flexion: 5/5  Right neck extension: 5/5  Left neck extension:  5/5  Right deltoid: 5/5  Left deltoid: 5/5  Right biceps: 5/5  Left biceps: 5/5  Right triceps: 5/5  Left triceps: 5/5  Right wrist flexion: 5/5  Left wrist flexion: 5/5  Right wrist extension: 5/5  Left wrist extension: 5/5  Right interossei: 5/5  Left interossei: 5/5  Right iliopsoas:   Left iliopsoas:   Right quadriceps:   Left quadriceps:   Right hamstrin/5  Left hamstrin/5  Right anterior tibial:   Left anterior tibial:   Right posterior tibial:   Left posterior tibial:   Right gastroc:   Left gastroc:     REFLEXES     Reflexes   Right brachioradialis: 2+  Left brachioradialis: 2+  Right biceps: 2+  Left biceps: 2+  Right triceps: 2+  Left triceps: 2+  Right patellar: 2+  Left patellar: 2+  Right achilles: 2+  Left achilles: 2+  Right plantar: normal  Left plantar: normal  Right Ayers: absent  Left Ayers: absent  Right ankle clonus: absent  Left ankle clonus: absent  Right pendular knee jerk: absent  Left pendular knee jerk: absent    SENSORY EXAM   Light touch normal.   Right arm light touch: normal  Left arm light touch: normal  Right leg light touch: normal  Left leg light touch: normal  Vibration normal.   Right arm vibration: normal  Left arm vibration: normal  Right leg vibration: normal  Left leg vibration: normal  Proprioception normal.   Right arm proprioception: normal  Left arm proprioception: normal  Right leg proprioception: normal  Left leg proprioception: normal  Pinprick normal.   Right arm pinprick: normal  Left arm pinprick: normal  Right leg pinprick: normal  Left leg pinprick: normal  Graphesthesia: normal  Romberg: negative  Stereognosis: normal    GAIT AND COORDINATION     Gait  Gait: wide-based     Coordination   Finger to nose coordination: normal  Heel to shin coordination: normal  Tandem walking coordination: abnormal    Tremor   Resting tremor: absent  Intention tremor: absent  Action tremor: absent       Physical Exam  Vitals and nursing note  reviewed.   Constitutional:       Appearance: Normal appearance.   HENT:      Head: Normocephalic.   Eyes:      Extraocular Movements: Extraocular movements intact and EOM normal.      Pupils: Pupils are equal, round, and reactive to light.   Cardiovascular:      Rate and Rhythm: Normal rate and regular rhythm.   Pulmonary:      Effort: Pulmonary effort is normal.      Breath sounds: Normal breath sounds.   Musculoskeletal:         General: No swelling or tenderness. Normal range of motion.      Cervical back: Normal range of motion and neck supple.      Right lower leg: No edema.      Left lower leg: No edema.   Skin:     General: Skin is warm and dry.      Coloration: Skin is not jaundiced.      Findings: No rash.   Neurological:      General: No focal deficit present.      Mental Status: She is alert and oriented to person, place, and time.      GCS: GCS eye subscore is 4. GCS verbal subscore is 5. GCS motor subscore is 6.      Cranial Nerves: No cranial nerve deficit.      Sensory: No sensory deficit.      Motor: Motor function is intact. No weakness.      Coordination: Coordination is intact. Coordination normal. Finger-Nose-Finger Test, Heel to Shin Test and Romberg Test normal.      Gait: Tandem walk abnormal. Gait normal.      Deep Tendon Reflexes: Reflexes normal.      Reflex Scores:       Tricep reflexes are 2+ on the right side and 2+ on the left side.       Bicep reflexes are 2+ on the right side and 2+ on the left side.       Brachioradialis reflexes are 2+ on the right side and 2+ on the left side.       Patellar reflexes are 2+ on the right side and 2+ on the left side.       Achilles reflexes are 2+ on the right side and 2+ on the left side.  Psychiatric:         Mood and Affect: Mood normal.         Speech: Speech normal.         Behavior: Behavior normal.          Assessment:     Problem List Items Addressed This Visit          Endocrine    Vitamin D deficiency (Chronic)    Relevant Orders     Vitamin D     Other Visit Diagnoses       Vitamin B12 deficiency    -  Primary    Relevant Orders    Folate    Vitamin B12    Nonintractable headache, unspecified chronicity pattern, unspecified headache type        Relevant Orders    Ambulatory referral/consult to Sleep Disorders    CBC Auto Differential    Comprehensive Metabolic Panel    MRI Brain W WO Contrast    Dementia, unspecified dementia severity, unspecified dementia type, unspecified whether behavioral, psychotic, or mood disturbance or anxiety        Relevant Orders    Ammonia    Urinalysis, Reflex to Urine Culture    Hypothyroidism, unspecified type        Relevant Orders    TSH             Primary Diagnosis and ICD10  Vitamin B12 deficiency [E53.8]    Plan:     Patient Instructions   Sleep study  MRI brain  Labs as ordered  Recommend evaluation with eye doctor for up to date evaluation  Hold on medications pending workup    Medications Discontinued During This Encounter   Medication Reason    amitriptyline (ELAVIL) 25 MG tablet     topiramate 25 mg capsule        Requested Prescriptions      No prescriptions requested or ordered in this encounter       Orders Placed This Encounter   Procedures    MRI Brain W WO Contrast    Ammonia    CBC Auto Differential    Comprehensive Metabolic Panel    Folate    Vitamin D    Vitamin B12    TSH    Urinalysis, Reflex to Urine Culture    Ambulatory referral/consult to Sleep Disorders

## 2024-06-07 ENCOUNTER — TELEPHONE (OUTPATIENT)
Dept: NEUROLOGY | Facility: CLINIC | Age: 69
End: 2024-06-07
Payer: COMMERCIAL

## 2024-06-07 DIAGNOSIS — E55.9 VITAMIN D DEFICIENCY: Primary | ICD-10-CM

## 2024-06-07 DIAGNOSIS — E78.5 HYPERLIPIDEMIA, UNSPECIFIED HYPERLIPIDEMIA TYPE: ICD-10-CM

## 2024-06-07 RX ORDER — LOVASTATIN 20 MG/1
TABLET ORAL
Qty: 90 TABLET | Refills: 1 | Status: SHIPPED | OUTPATIENT
Start: 2024-06-07

## 2024-06-07 RX ORDER — ERGOCALCIFEROL 1.25 MG/1
50000 CAPSULE ORAL
Qty: 4 CAPSULE | Refills: 2 | Status: SHIPPED | OUTPATIENT
Start: 2024-06-07

## 2024-06-07 NOTE — TELEPHONE ENCOUNTER
PT VOICED UNDERSTANDING.    ----- Message from TEENA Spring sent at 6/7/2024  7:30 AM CDT -----  Her vitamin D is very low, I sent in supplement for her to take as directed   Her B12 is also low, she needs to get over the counter B12 tablet and take daily, we will repeat the level in 6 months, if not improved then will need to do monthly injections

## 2024-06-20 NOTE — PROGRESS NOTES
Subjective:         Patient ID: Xavier Rodriguez is a 68 y.o. female.    Chief Complaint: Abdominal Pain        Pain  This is a chronic problem. The current episode started more than 1 year ago. The problem occurs daily. The problem has been unchanged. Associated symptoms include arthralgias, headaches and neck pain. Pertinent negatives include no anorexia, change in bowel habit, chest pain, chills, fever, sore throat, swollen glands, urinary symptoms or vertigo.     Review of Systems   Constitutional:  Negative for activity change, appetite change, chills, fever and unexpected weight change.   HENT:  Negative for drooling, ear discharge, facial swelling, mouth dryness, nosebleeds, sore throat, trouble swallowing, voice change and goiter.    Eyes:  Negative for discharge and visual disturbance.   Respiratory:  Negative for apnea, choking, chest tightness, shortness of breath, wheezing and stridor.    Cardiovascular:  Negative for chest pain, palpitations and leg swelling.   Gastrointestinal:  Negative for abdominal distention, anorexia, change in bowel habit, diarrhea, rectal pain and fecal incontinence.   Endocrine: Negative for cold intolerance, heat intolerance, polydipsia, polyphagia and polyuria.   Genitourinary:  Negative for flank pain, frequency and hot flashes.   Musculoskeletal:  Positive for arthralgias, leg pain and neck pain.   Integumentary:  Negative for color change and pallor.   Allergic/Immunologic: Negative for immunocompromised state.   Neurological:  Positive for headaches. Negative for vertigo, syncope, facial asymmetry, speech difficulty, light-headedness, memory loss and coordination difficulties.   Hematological:  Negative for adenopathy. Does not bruise/bleed easily.   Psychiatric/Behavioral:  Negative for agitation, behavioral problems, confusion, decreased concentration, dysphoric mood, hallucinations, self-injury and suicidal ideas. The patient is not nervous/anxious and is not  "hyperactive.            Past Medical History:   Diagnosis Date    Chronic low back pain     Depression     Dysfunctional voiding of urine 11/10/2021    Patient states she does not urinate.  She was taken off of lasix which was the only thing that helped her empty. PVR 0 ml Chronic constipation, lifelong.  Has been out of Linzess for a month now. 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    INJECTION OF ANESTHETIC AGENT AROUND MEDIAL BRANCH NERVES INNERVATING LUMBAR FACET JOINT Bilateral 4/13/2023    Procedure: Bilateral L4-5,5-S1 MBB;  Surgeon: Ilsa Mendez MD;  Location: [facility];  Service: Pain Management;  Laterality: Bilateral;    TUMOR REMOVAL       Social History     Socioeconomic History    Marital status: Single   Tobacco Use    Smoking status: Former     Current packs/day: 0.00     Types: Cigarettes     Passive exposure: Past    Smokeless tobacco: Never    Tobacco comments:     Stop smoking about 15 years ago   Substance and Sexual Activity    Alcohol use: Never    Drug use: Yes     Types: Hydrocodone    Sexual activity: Not Currently     Partners: Male     Birth control/protection: None     Family History   Problem Relation Name Age of Onset    Heart disease Mother Telma Rodriguez     Heart attack Mother Telma Rodriguez     Heart disease Father Alfonso Rodriguez         Heart attack    Heart attack Father Alfonso Rodriguez      Review of patient's allergies indicates:  No Known Allergies     Objective:  Vitals:    06/26/24 1124   BP: (!) 157/80   Pulse: 73   Resp: 18   Weight: 80.7 kg (178 lb)   Height: 5' 4" (1.626 m)   PainSc:   8           Physical Exam  Vitals and nursing note reviewed. Exam conducted with a chaperone present.   Constitutional:       General: She is awake. She is not in acute distress.     " Appearance: Normal appearance. She is not ill-appearing, toxic-appearing or diaphoretic.   HENT:      Head: Normocephalic and atraumatic.      Nose: Nose normal.      Mouth/Throat:      Mouth: Mucous membranes are moist.      Pharynx: Oropharynx is clear.   Eyes:      Conjunctiva/sclera: Conjunctivae normal.      Pupils: Pupils are equal, round, and reactive to light.   Cardiovascular:      Rate and Rhythm: Normal rate.   Pulmonary:      Effort: Pulmonary effort is normal. No respiratory distress.   Abdominal:      Palpations: Abdomen is soft.      Tenderness: There is no guarding.   Musculoskeletal:         General: Normal range of motion.      Cervical back: Normal range of motion and neck supple. No rigidity.      Thoracic back: Tenderness present.      Lumbar back: Tenderness present.   Skin:     General: Skin is warm and dry.      Coloration: Skin is not jaundiced or pale.   Neurological:      General: No focal deficit present.      Mental Status: She is alert and oriented to person, place, and time. Mental status is at baseline.      Cranial Nerves: No cranial nerve deficit (II-XII).   Psychiatric:         Mood and Affect: Mood normal.         Behavior: Behavior normal. Behavior is cooperative.         Thought Content: Thought content normal.           Colonoscopy  Narrative: Table formatting from the original result was not included.  Procedure Date  3/18/24    Impression  Overall   Impression:    Skin tag  Hemorrhoids  Multiple ulcers in the rectum; performed cold forceps biopsy    Recommendation  - Repeat colonoscopy in 10 years  - Discharge patient to home  - Advance diet as tolerated  - Continue present medications  - Await pathology results  - Patient has a contact number available for emergencies. The signs and   symptoms of potential delayed complications were discussed with the   patient. Return to normal activities tomorrow. Written discharge   instructions were provided to the  patient.    Indication  Screening for malignant neoplasm of colon    Providers  Nae Overton Technician   Corinne Hobson RN Registered Nurse   Marvin Mcintosh, Del Tate CRNA, MD Proceduralist     Medications  General anesthesia - See anesthesia record.    Preprocedure  A history and physical has been performed, and patient medication   allergies have been reviewed. The patient's tolerance of previous   anesthesia has been reviewed. The risks and benefits of the procedure and   the sedation options and risks were discussed with the patient. All   questions were answered and informed consent obtained.    ASA Score: ASA 3 - Patient with moderate systemic disease with functional   limitations  Mallampati Airway Score: II (hard and soft palate, upper portion of   tonsils anduvula visible)    Details of the Procedure  The patient underwent general anesthesia, which was administered by an   anesthesia professional. The patient's heart rate, blood pressure, level   of consciousness, respirations, oxygen, ECG and ETCO2 were monitored   throughout the procedure. A digital rectal exam was performed. A perianal   exam was performed. The scope was introduced through the anus and advanced   to the cecum. Retroflexion was performed in the rectum. The quality of   bowel preparation was evaluated using the [place] Bowel Preparation Scale   with scores of: right colon = 3, transverse colon = 3, left colon = 3. The   total BBPS score was 9. Bowel prep was adequate. The patient's estimated   blood loss was minimal (<5 mL). The procedure was not difficult. The   patient tolerated the procedure well. There were no apparent adverse   events.     Scope: Pediatric Colonoscope  Scope Serial: [id number]    Events    Procedure Events   Event Event Time     Procedure Events   Event Event Time   ENDO SCOPE IN TIME 3/18/2024 10:58 AM   ENDO CECUM REACHED 3/18/2024 11:09 AM   ENDO SCOPE OUT TIME 3/18/2024 11:20 AM     CECAL  WITHDRAWAL TIME: 10m 59s    Findings  Skin tag observed during digital rectal exam  Hemorrhoids observed during digital rectal exam  Multiple large, superficial, linear ulcers in the rectum; performed cold   forceps biopsy. 10 cm from anal verge         Lab Visit on 06/06/2024   Component Date Value Ref Range Status    Ammonia 06/06/2024 21  11 - 32 µmol/L Final    Sodium 06/06/2024 143  136 - 145 mmol/L Final    Potassium 06/06/2024 4.3  3.5 - 5.1 mmol/L Final    Chloride 06/06/2024 112 (H)  98 - 107 mmol/L Final    CO2 06/06/2024 31  21 - 32 mmol/L Final    Anion Gap 06/06/2024 4 (L)  7 - 16 mmol/L Final    Glucose 06/06/2024 105  74 - 106 mg/dL Final    BUN 06/06/2024 11  7 - 18 mg/dL Final    Creatinine 06/06/2024 0.90  0.55 - 1.02 mg/dL Final    BUN/Creatinine Ratio 06/06/2024 12  6 - 20 Final    Calcium 06/06/2024 8.8  8.5 - 10.1 mg/dL Final    Total Protein 06/06/2024 7.4  6.4 - 8.2 g/dL Final    Albumin 06/06/2024 3.8  3.5 - 5.0 g/dL Final    Globulin 06/06/2024 3.6  2.0 - 4.0 g/dL Final    A/G Ratio 06/06/2024 1.1   Final    Bilirubin, Total 06/06/2024 1.0  >0.0 - 1.2 mg/dL Final    Alk Phos 06/06/2024 63  55 - 142 U/L Final    ALT 06/06/2024 10 (L)  13 - 56 U/L Final    AST 06/06/2024 9 (L)  15 - 37 U/L Final    eGFR 06/06/2024 69  >=60 mL/min/1.73m2 Final    Folate 06/06/2024 3.0 (L)  3.1 - 17.5 ng/mL Final    Vitamin D 25-Hydroxy, Blood 06/06/2024 6.4  ng/mL Final    Vitamin B12 06/06/2024 288  193 - 986 pg/mL Final    TSH 06/06/2024 0.982  0.358 - 3.740 uIU/mL Final    WBC 06/06/2024 4.97  4.50 - 11.00 K/uL Final    RBC 06/06/2024 3.89 (L)  4.20 - 5.40 M/uL Final    Hemoglobin 06/06/2024 11.3 (L)  12.0 - 16.0 g/dL Final    Hematocrit 06/06/2024 36.2 (L)  38.0 - 47.0 % Final    MCV 06/06/2024 93.1  80.0 - 96.0 fL Final    MCH 06/06/2024 29.0  27.0 - 31.0 pg Final    MCHC 06/06/2024 31.2 (L)  32.0 - 36.0 g/dL Final    RDW 06/06/2024 15.3 (H)  11.5 - 14.5 % Final    Platelet Count 06/06/2024 263  150 - 400  K/uL Final    MPV 06/06/2024 11.6  9.4 - 12.4 fL Final    Neutrophils % 06/06/2024 45.3 (L)  53.0 - 65.0 % Final    Lymphocytes % 06/06/2024 43.7 (H)  27.0 - 41.0 % Final    Monocytes % 06/06/2024 7.8 (H)  2.0 - 6.0 % Final    Eosinophils % 06/06/2024 0.0 (L)  1.0 - 4.0 % Final    Basophils % 06/06/2024 0.0  0.0 - 1.0 % Final    Immature Granulocytes % 06/06/2024 3.2 (H)  0.0 - 0.4 % Final    nRBC, Auto 06/06/2024 0.0  <=0.0 % Final    Neutrophils, Abs 06/06/2024 2.25  1.80 - 7.70 K/uL Final    Lymphocytes, Absolute 06/06/2024 2.17  1.00 - 4.80 K/uL Final    Monocytes, Absolute 06/06/2024 0.39  0.00 - 0.80 K/uL Final    Eosinophils, Absolute 06/06/2024 0.00  0.00 - 0.50 K/uL Final    Basophils, Absolute 06/06/2024 0.00  0.00 - 0.20 K/uL Final    Immature Granulocytes, Absolute 06/06/2024 0.16 (H)  0.00 - 0.04 K/uL Final    nRBC, Absolute 06/06/2024 0.00  <=0.00 x10e3/uL Final    Diff Type 06/06/2024 Manual   Final    Segmented Neutrophils, Man % 06/06/2024 54  50 - 62 % Final    Lymphocytes, Man % 06/06/2024 45 (H)  27 - 41 % Final    Monocytes, Man % 06/06/2024 1 (L)  2 - 6 % Final    Platelet Morphology 06/06/2024 Normal  Normal Final    Anisocytosis 06/06/2024 1+   Final    Ovalocytes 06/06/2024 1+   Final    Reactive Lymphocytes 06/06/2024 Few   Final   Office Visit on 04/22/2024   Component Date Value Ref Range Status    Color, UA 04/22/2024 Brown   Final    Spec Grav UA 04/22/2024 1.025   Final    pH, UA 04/22/2024 5.5   Final    WBC, UA 04/22/2024 sm   Final    Nitrite, UA 04/22/2024 neg   Final    Protein, POC 04/22/2024 neg   Final    Glucose, UA 04/22/2024 neg   Final    Ketones, UA 04/22/2024 tr   Final    Bilirubin, POC 04/22/2024 neg   Final    Urobilinogen, UA 04/22/2024 1.0   Final    Blood, UA 04/22/2024 neg   Final    Color, UA 04/22/2024 Yellow  Colorless, Straw, Yellow, Light Yellow, Dark Yellow Final    Clarity, UA 04/22/2024 Clear  Clear Final    pH, UA 04/22/2024 5.5  5.0 to 8.0 pH Units Final     Leukocytes, UA 04/22/2024 Large (A)  Negative Final    Nitrites, UA 04/22/2024 Negative  Negative Final    Protein, UA 04/22/2024 10 (A)  Negative Final    Glucose, UA 04/22/2024 Normal  Normal mg/dL Final    Ketones, UA 04/22/2024 Negative  Negative mg/dL Final    Urobilinogen, UA 04/22/2024 2 (A)  0.2, 1.0, Normal mg/dL Final    Bilirubin, UA 04/22/2024 Negative  Negative Final    Blood, UA 04/22/2024 Negative  Negative Final    Specific Gravity, UA 04/22/2024 1.023  <=1.030 Final    Culture, Urine 04/22/2024 84,000 Streptococcus agalactiae (Group B) (A)   Final    WBC, UA 04/22/2024 2  <=5 /hpf Final    RBC, UA 04/22/2024 2  <=3 /hpf Final    Squamous Epithelial Cells, UA 04/22/2024 Occasional (A)  None Seen /HPF Final    Mucous 04/22/2024 Occasional (A)  None Seen /LPF Final   Hospital Outpatient Visit on 03/18/2024   Component Date Value Ref Range Status    Case Report 03/18/2024    Final                    Value:Surgical Pathology                                Case: [id number]                                   Authorizing Provider:  Del Guzmán MD     Collected:           03/18/2024 11:18 AM          Ordering Location:     [facility] -         Received:            03/18/2024 11:55 AM                                 Endoscopy                                                                    Pathologist:           Yadiel Cooney MD                                                           Specimen:    Rectum, a. rectum ulcer bx                                                                 Final Diagnosis 03/18/2024    Final                    Value:This result contains rich text formatting which cannot be displayed here.    Gross Description 03/18/2024    Final                    Value:This result contains rich text formatting which cannot be displayed here.    Microscopic Description 03/18/2024    Final                    Value:This result contains rich text formatting which cannot be  displayed here.    Laboratory Notes 03/18/2024    Final                    Value:This result contains rich text formatting which cannot be displayed here.   Office Visit on 01/22/2024   Component Date Value Ref Range Status    POC Amphetamines 01/22/2024 Negative  Negative, Inconclusive Final    POC Barbiturates 01/22/2024 Negative  Negative, Inconclusive Final    POC Benzodiazepines 01/22/2024 Negative  Negative, Inconclusive Final    POC Cocaine 01/22/2024 Negative  Negative, Inconclusive Final    POC THC 01/22/2024 Negative  Negative, Inconclusive Final    POC Methadone 01/22/2024 Negative  Negative, Inconclusive Final    POC Methamphetamine 01/22/2024 Negative  Negative, Inconclusive Final    POC Opiates 01/22/2024 Presumptive Positive (A)  Negative, Inconclusive Final    POC Oxycodone 01/22/2024 Negative  Negative, Inconclusive Final    POC Phencyclidine 01/22/2024 Negative  Negative, Inconclusive Final    POC Methylenedioxymethamphetamine * 01/22/2024 Negative  Negative, Inconclusive Final    POC Tricyclic Antidepressants 01/22/2024 Negative  Negative, Inconclusive Final    POC Buprenorphine 01/22/2024 Negative   Final     Acceptable 01/22/2024 Yes   Final    POC Temperature (Urine) 01/22/2024 92   Final         Orders Placed This Encounter   Procedures    Ambulatory referral/consult to Gastroenterology     Standing Status:   Future     Standing Expiration Date:   7/26/2025     Referral Priority:   Routine     Referral Type:   Consultation     Referral Reason:   Specialty Services Required     Requested Specialty:   Gastroenterology     Number of Visits Requested:   1    POCT Urine Drug Screen Presump     Interpretive Information:     Negative:  No drug detected at the cut off level.   Positive:  This result represents presumptive positive for the   tested drug, other substances may yield a positive response other   than the analyte of interest. This result should be utilized for   diagnostic  purpose only. Confirmation testing will be performed upon physician request only.          Requested Prescriptions     Signed Prescriptions Disp Refills    HYDROcodone-acetaminophen (NORCO) 7.5-325 mg per tablet 30 tablet 0     Sig: Take 1 tablet by mouth once daily.    HYDROcodone-acetaminophen (NORCO) 7.5-325 mg per tablet 30 tablet 0     Sig: Take 1 tablet by mouth once daily.    HYDROcodone-acetaminophen (NORCO) 7.5-325 mg per tablet 30 tablet 0     Sig: Take 1 tablet by mouth once daily.    naloxone (NARCAN) 4 mg/actuation Spry 1 each 0     Si spray (4 mg total) by Nasal route once. Call 911, One sprays alternate nostril, may repeat 2-3 minutes as needed for 1 dose       Assessment:     1. Lumbosacral radiculopathy    2. Disorder of sacrum    3. Generalized abdominal pain    4. Encounter for long-term (current) use of other medications           A's of Opioid Risk Assessment  Activity:Patient can perform ADL.   Analgesia:Patients pain is partially controlled by current medication. Patient has tried OTC medications such as Tylenol and Ibuprofen with out relief.   Adverse Effects: Patient denies constipation or sedation.  Aberrant Behavior:  reviewed with no aberrant drug seeking/taking behavior.  Overdose reversal drug naloxone discussed    Drug screen reviewed      Plan:    Narcan 2024    She had Bilateral lumbar L4 through S1 medial branch block # 1, 2023 she states she had 100% relief after procedure, the procedure did help improve her level of function      Daughter monitors her mother's medication    Today she is complaint abdominal pain chronic constipation requesting referral gastroenterology for evaluation     She states her current medication 1 time per day is helping control her chronic joint back pain     She has no new complaints from pain management standpoint    The patient would like to continue current medications she states it does help with her discomfort    Referral  gastroenterology abdominal pain chronic constipation    Continue home exercise program as directed    Continue current medication    Follow-up 3 months    Dr. Mendez, April 2024    Bring original prescription medication bottles/container/box with labels to each visit

## 2024-06-26 ENCOUNTER — OFFICE VISIT (OUTPATIENT)
Dept: PAIN MEDICINE | Facility: CLINIC | Age: 69
End: 2024-06-26
Payer: COMMERCIAL

## 2024-06-26 VITALS
WEIGHT: 178 LBS | SYSTOLIC BLOOD PRESSURE: 157 MMHG | BODY MASS INDEX: 30.39 KG/M2 | HEIGHT: 64 IN | HEART RATE: 73 BPM | RESPIRATION RATE: 18 BRPM | DIASTOLIC BLOOD PRESSURE: 80 MMHG

## 2024-06-26 DIAGNOSIS — M54.17 LUMBOSACRAL RADICULOPATHY: Primary | Chronic | ICD-10-CM

## 2024-06-26 DIAGNOSIS — R10.84 GENERALIZED ABDOMINAL PAIN: Chronic | ICD-10-CM

## 2024-06-26 DIAGNOSIS — M53.3 DISORDER OF SACRUM: Chronic | ICD-10-CM

## 2024-06-26 DIAGNOSIS — Z79.899 ENCOUNTER FOR LONG-TERM (CURRENT) USE OF OTHER MEDICATIONS: ICD-10-CM

## 2024-06-26 LAB
CTP QC/QA: YES
POC (AMP) AMPHETAMINE: NEGATIVE
POC (BAR) BARBITURATES: NEGATIVE
POC (BUP) BUPRENORPHINE: NEGATIVE
POC (BZO) BENZODIAZEPINES: NEGATIVE
POC (COC) COCAINE: NEGATIVE
POC (MDMA) METHYLENEDIOXYMETHAMPHETAMINE 3,4: NEGATIVE
POC (MET) METHAMPHETAMINE: NEGATIVE
POC (MOP) OPIATES: NEGATIVE
POC (MTD) METHADONE: NEGATIVE
POC (OXY) OXYCODONE: NEGATIVE
POC (PCP) PHENCYCLIDINE: NEGATIVE
POC (TCA) TRICYCLIC ANTIDEPRESSANTS: NEGATIVE
POC TEMPERATURE (URINE): 90
POC THC: NEGATIVE

## 2024-06-26 PROCEDURE — 80305 DRUG TEST PRSMV DIR OPT OBS: CPT | Mod: PBBFAC | Performed by: PHYSICIAN ASSISTANT

## 2024-06-26 PROCEDURE — 1125F AMNT PAIN NOTED PAIN PRSNT: CPT | Mod: CPTII,,, | Performed by: PHYSICIAN ASSISTANT

## 2024-06-26 PROCEDURE — 99999PBSHW POCT URINE DRUG SCREEN PRESUMP: Mod: PBBFAC,,,

## 2024-06-26 PROCEDURE — 99214 OFFICE O/P EST MOD 30 MIN: CPT | Mod: PBBFAC | Performed by: PHYSICIAN ASSISTANT

## 2024-06-26 PROCEDURE — 3288F FALL RISK ASSESSMENT DOCD: CPT | Mod: CPTII,,, | Performed by: PHYSICIAN ASSISTANT

## 2024-06-26 PROCEDURE — 1159F MED LIST DOCD IN RCRD: CPT | Mod: CPTII,,, | Performed by: PHYSICIAN ASSISTANT

## 2024-06-26 PROCEDURE — 3077F SYST BP >= 140 MM HG: CPT | Mod: CPTII,,, | Performed by: PHYSICIAN ASSISTANT

## 2024-06-26 PROCEDURE — 99214 OFFICE O/P EST MOD 30 MIN: CPT | Mod: S$PBB,,, | Performed by: PHYSICIAN ASSISTANT

## 2024-06-26 PROCEDURE — 3008F BODY MASS INDEX DOCD: CPT | Mod: CPTII,,, | Performed by: PHYSICIAN ASSISTANT

## 2024-06-26 PROCEDURE — 1101F PT FALLS ASSESS-DOCD LE1/YR: CPT | Mod: CPTII,,, | Performed by: PHYSICIAN ASSISTANT

## 2024-06-26 PROCEDURE — 99999 PR PBB SHADOW E&M-EST. PATIENT-LVL IV: CPT | Mod: PBBFAC,,, | Performed by: PHYSICIAN ASSISTANT

## 2024-06-26 PROCEDURE — 3079F DIAST BP 80-89 MM HG: CPT | Mod: CPTII,,, | Performed by: PHYSICIAN ASSISTANT

## 2024-06-26 RX ORDER — HYDROCODONE BITARTRATE AND ACETAMINOPHEN 7.5; 325 MG/1; MG/1
1 TABLET ORAL DAILY
Qty: 30 TABLET | Refills: 0 | Status: SHIPPED | OUTPATIENT
Start: 2024-06-26

## 2024-06-26 RX ORDER — HYDROCODONE BITARTRATE AND ACETAMINOPHEN 7.5; 325 MG/1; MG/1
1 TABLET ORAL DAILY
Qty: 30 TABLET | Refills: 0 | Status: SHIPPED | OUTPATIENT
Start: 2024-08-25

## 2024-06-26 RX ORDER — HYDROCODONE BITARTRATE AND ACETAMINOPHEN 7.5; 325 MG/1; MG/1
1 TABLET ORAL DAILY
Qty: 30 TABLET | Refills: 0 | Status: SHIPPED | OUTPATIENT
Start: 2024-07-26

## 2024-06-26 RX ORDER — NALOXONE HYDROCHLORIDE 4 MG/.1ML
1 SPRAY NASAL ONCE
Qty: 1 EACH | Refills: 0 | Status: SHIPPED | OUTPATIENT
Start: 2024-06-26 | End: 2024-06-26

## 2024-07-02 ENCOUNTER — OFFICE VISIT (OUTPATIENT)
Dept: FAMILY MEDICINE | Facility: CLINIC | Age: 69
End: 2024-07-02
Payer: COMMERCIAL

## 2024-07-02 VITALS
WEIGHT: 175.81 LBS | BODY MASS INDEX: 30.02 KG/M2 | HEIGHT: 64 IN | HEART RATE: 56 BPM | SYSTOLIC BLOOD PRESSURE: 140 MMHG | DIASTOLIC BLOOD PRESSURE: 78 MMHG | TEMPERATURE: 99 F

## 2024-07-02 DIAGNOSIS — E78.5 HYPERLIPIDEMIA, UNSPECIFIED HYPERLIPIDEMIA TYPE: ICD-10-CM

## 2024-07-02 DIAGNOSIS — G89.4 CHRONIC PAIN SYNDROME: ICD-10-CM

## 2024-07-02 DIAGNOSIS — Z79.899 ENCOUNTER FOR LONG-TERM CURRENT USE OF MEDICATION: ICD-10-CM

## 2024-07-02 DIAGNOSIS — Z12.31 ENCOUNTER FOR SCREENING MAMMOGRAM FOR MALIGNANT NEOPLASM OF BREAST: ICD-10-CM

## 2024-07-02 DIAGNOSIS — Z78.0 ENCOUNTER FOR OSTEOPOROSIS SCREENING IN ASYMPTOMATIC POSTMENOPAUSAL PATIENT: ICD-10-CM

## 2024-07-02 DIAGNOSIS — Z13.820 ENCOUNTER FOR OSTEOPOROSIS SCREENING IN ASYMPTOMATIC POSTMENOPAUSAL PATIENT: ICD-10-CM

## 2024-07-02 DIAGNOSIS — K59.00 CONSTIPATION, UNSPECIFIED CONSTIPATION TYPE: ICD-10-CM

## 2024-07-02 DIAGNOSIS — Z00.00 ENCOUNTER FOR SUBSEQUENT ANNUAL WELLNESS VISIT (AWV) IN MEDICARE PATIENT: Primary | ICD-10-CM

## 2024-07-02 DIAGNOSIS — Z00.00 ENCOUNTER FOR PREVENTIVE HEALTH EXAMINATION: ICD-10-CM

## 2024-07-02 PROCEDURE — 1170F FXNL STATUS ASSESSED: CPT | Mod: ,,, | Performed by: NURSE PRACTITIONER

## 2024-07-02 PROCEDURE — 1160F RVW MEDS BY RX/DR IN RCRD: CPT | Mod: ,,, | Performed by: NURSE PRACTITIONER

## 2024-07-02 PROCEDURE — 1101F PT FALLS ASSESS-DOCD LE1/YR: CPT | Mod: ,,, | Performed by: NURSE PRACTITIONER

## 2024-07-02 PROCEDURE — 3077F SYST BP >= 140 MM HG: CPT | Mod: ,,, | Performed by: NURSE PRACTITIONER

## 2024-07-02 PROCEDURE — 3288F FALL RISK ASSESSMENT DOCD: CPT | Mod: ,,, | Performed by: NURSE PRACTITIONER

## 2024-07-02 PROCEDURE — G0439 PPPS, SUBSEQ VISIT: HCPCS | Mod: ,,, | Performed by: NURSE PRACTITIONER

## 2024-07-02 PROCEDURE — 1159F MED LIST DOCD IN RCRD: CPT | Mod: ,,, | Performed by: NURSE PRACTITIONER

## 2024-07-02 PROCEDURE — 1125F AMNT PAIN NOTED PAIN PRSNT: CPT | Mod: ,,, | Performed by: NURSE PRACTITIONER

## 2024-07-02 PROCEDURE — 3078F DIAST BP <80 MM HG: CPT | Mod: ,,, | Performed by: NURSE PRACTITIONER

## 2024-07-02 RX ORDER — NAPROXEN 500 MG/1
500 TABLET ORAL DAILY PRN
Qty: 30 TABLET | Refills: 0 | Status: SHIPPED | OUTPATIENT
Start: 2024-07-02

## 2024-07-02 NOTE — PATIENT INSTRUCTIONS
Counseling and Referral of Other Preventative  (Italic type indicates deductible and co-insurance are waived)    Patient Name: Xavier Rodriguez  Today's Date: 7/2/2024    Health Maintenance       Date Due Completion Date    Pneumococcal Vaccines (Age 65+) (1 of 2 - PCV) Never done ---    TETANUS VACCINE Never done ---    Mammogram Never done ---    Hemoglobin A1c (Diabetic Prevention Screening) Never done ---    DEXA Scan Never done ---    Shingles Vaccine (1 of 2) Never done ---    RSV Vaccine (Age 60+ and Pregnant patients) (1 - 1-dose 60+ series) Never done ---    COVID-19 Vaccine (1 - 2023-24 season) Never done ---    Influenza Vaccine (1) 09/01/2024 12/19/2023    Override on 12/2/2022: Declined    Lipid Panel 12/19/2028 12/19/2023    Colorectal Cancer Screening 03/18/2034 3/18/2024        No orders of the defined types were placed in this encounter.    The following information is provided to all patients.  This information is to help you find resources for any of the problems found today that may be affecting your health:                  Living healthy guide: ms.gov    Understanding Diabetes: www.diabetes.org      Eating healthy: www.cdc.gov/healthyweight      CDC home safety checklist: www.cdc.gov/steadi/patient.html      Agency on Aging: ms.gov    Alcoholics anonymous (AA): www.aa.org      Physical Activity: www.jazz.nih.gov/hz9glwp      Tobacco use: ms.gov

## 2024-07-30 ENCOUNTER — OFFICE VISIT (OUTPATIENT)
Dept: FAMILY MEDICINE | Facility: CLINIC | Age: 69
End: 2024-07-30
Payer: COMMERCIAL

## 2024-07-30 VITALS
BODY MASS INDEX: 29.77 KG/M2 | WEIGHT: 174.38 LBS | DIASTOLIC BLOOD PRESSURE: 80 MMHG | HEIGHT: 64 IN | RESPIRATION RATE: 18 BRPM | TEMPERATURE: 99 F | OXYGEN SATURATION: 98 % | HEART RATE: 63 BPM | SYSTOLIC BLOOD PRESSURE: 144 MMHG

## 2024-07-30 DIAGNOSIS — I10 PRIMARY HYPERTENSION: Primary | ICD-10-CM

## 2024-07-30 PROCEDURE — 3079F DIAST BP 80-89 MM HG: CPT | Mod: ,,, | Performed by: NURSE PRACTITIONER

## 2024-07-30 PROCEDURE — 3077F SYST BP >= 140 MM HG: CPT | Mod: ,,, | Performed by: NURSE PRACTITIONER

## 2024-07-30 PROCEDURE — 1101F PT FALLS ASSESS-DOCD LE1/YR: CPT | Mod: ,,, | Performed by: NURSE PRACTITIONER

## 2024-07-30 PROCEDURE — 1160F RVW MEDS BY RX/DR IN RCRD: CPT | Mod: ,,, | Performed by: NURSE PRACTITIONER

## 2024-07-30 PROCEDURE — 99213 OFFICE O/P EST LOW 20 MIN: CPT | Mod: ,,, | Performed by: NURSE PRACTITIONER

## 2024-07-30 PROCEDURE — 3008F BODY MASS INDEX DOCD: CPT | Mod: ,,, | Performed by: NURSE PRACTITIONER

## 2024-07-30 PROCEDURE — 1126F AMNT PAIN NOTED NONE PRSNT: CPT | Mod: ,,, | Performed by: NURSE PRACTITIONER

## 2024-07-30 PROCEDURE — 1159F MED LIST DOCD IN RCRD: CPT | Mod: ,,, | Performed by: NURSE PRACTITIONER

## 2024-07-30 PROCEDURE — 3288F FALL RISK ASSESSMENT DOCD: CPT | Mod: ,,, | Performed by: NURSE PRACTITIONER

## 2024-07-30 RX ORDER — LOSARTAN POTASSIUM 25 MG/1
25 TABLET ORAL DAILY
Qty: 30 TABLET | Refills: 1 | Status: SHIPPED | OUTPATIENT
Start: 2024-07-30 | End: 2025-07-30

## 2024-07-30 NOTE — PROGRESS NOTES
Clinic Note    Xavier Rodriguez is a 68 y.o. female     Chief Complaint:   Chief Complaint   Patient presents with    Follow-up     4 week follow up         Subjective:    Patient comes in today for 4 week follow up on htn. Patient comes in today with daughter.  Daughter was not aware to be monitoring bp at home. States she has not checked but does have a cuff at home.  Patient denies any headache, chest pain, or sob.     Follow-up  Pertinent negatives include no abdominal pain, chest pain, coughing, fever, headaches, nausea or vomiting.        Allergies:   Review of patient's allergies indicates:  No Known Allergies     Past Medical History:  Past Medical History:   Diagnosis Date    Chronic low back pain     Depression     Dysfunctional voiding of urine 11/10/2021    Patient states she does not urinate.  She was taken off of lasix which was the only thing that helped her empty. PVR 0 ml Chronic constipation, lifelong.  Has been out of Linzess for a month now. Renal function WNL when last checked in May    Edema     Hyperlipidemia     Hypertension     IBS (irritable bowel syndrome)         Current Medications:    Current Outpatient Medications:     ergocalciferol (ERGOCALCIFEROL) 50,000 unit Cap, Take 1 capsule (50,000 Units total) by mouth every 7 days., Disp: 4 capsule, Rfl: 2    furosemide (LASIX) 20 MG tablet, TAKE 1 TABLET BY MOUTH EVERY DAY AS NEEDED, Disp: 30 tablet, Rfl: 0    [START ON 8/25/2024] HYDROcodone-acetaminophen (NORCO) 7.5-325 mg per tablet, Take 1 tablet by mouth once daily., Disp: 30 tablet, Rfl: 0    HYDROcodone-acetaminophen (NORCO) 7.5-325 mg per tablet, Take 1 tablet by mouth once daily., Disp: 30 tablet, Rfl: 0    HYDROcodone-acetaminophen (NORCO) 7.5-325 mg per tablet, Take 1 tablet by mouth once daily., Disp: 30 tablet, Rfl: 0    linaCLOtide (LINZESS) 145 mcg Cap capsule, Take 1 capsule by mouth., Disp: , Rfl:     lovastatin (MEVACOR) 20 MG tablet, TAKE 1 TABLET BY MOUTH EVERY DAY IN THE  "EVENING, Disp: 90 tablet, Rfl: 1    naproxen (NAPROSYN) 500 MG tablet, Take 1 tablet (500 mg total) by mouth daily as needed., Disp: 30 tablet, Rfl: 0    potassium chloride SA (K-DUR,KLOR-CON) 20 MEQ tablet, Take 1 tablet (20 mEq total) by mouth once daily., Disp: 90 tablet, Rfl: 1    tamsulosin (FLOMAX) 0.4 mg Cap, Take 1 capsule (0.4 mg total) by mouth once daily., Disp: 90 capsule, Rfl: 1    losartan (COZAAR) 25 MG tablet, Take 1 tablet (25 mg total) by mouth once daily., Disp: 30 tablet, Rfl: 1       Review of Systems   Constitutional:  Negative for fever.   Respiratory:  Negative for cough and shortness of breath.    Cardiovascular:  Negative for chest pain, palpitations and leg swelling.   Gastrointestinal:  Negative for abdominal pain, constipation, diarrhea, nausea and vomiting.   Genitourinary:  Negative for dysuria.   Musculoskeletal:  Positive for back pain and gait problem.   Neurological:  Negative for headaches.          Objective:    BP (!) 144/80 (BP Location: Left arm, Patient Position: Sitting, BP Method: Medium (Manual))   Pulse 63   Temp 99.1 °F (37.3 °C) (Oral)   Resp 18   Ht 5' 4" (1.626 m)   Wt 79.1 kg (174 lb 6.4 oz)   SpO2 98%   BMI 29.94 kg/m²      Physical Exam  Constitutional:       Appearance: Normal appearance.   Eyes:      Extraocular Movements: Extraocular movements intact.   Cardiovascular:      Rate and Rhythm: Normal rate and regular rhythm.      Pulses: Normal pulses.      Heart sounds: Normal heart sounds.   Pulmonary:      Effort: Pulmonary effort is normal.      Breath sounds: Normal breath sounds.   Musculoskeletal:      Right lower leg: No edema.      Left lower leg: No edema.      Comments: Cane     Neurological:      Mental Status: She is alert. She is disoriented.      Gait: Gait abnormal.          Assessment and Plan:    1. Primary hypertension         Primary hypertension  -     losartan (COZAAR) 25 MG tablet; Take 1 tablet (25 mg total) by mouth once daily.  " Dispense: 30 tablet; Refill: 1    -low salt diet and exercise  -add new med for bp control.   -monitor bp at home and record. Bring log to 4 week f/u      There are no Patient Instructions on file for this visit.   Follow up in about 4 weeks (around 8/27/2024).

## 2024-08-01 DIAGNOSIS — G89.4 CHRONIC PAIN SYNDROME: ICD-10-CM

## 2024-08-01 RX ORDER — NAPROXEN 500 MG/1
500 TABLET ORAL DAILY PRN
Qty: 30 TABLET | Refills: 0 | Status: SHIPPED | OUTPATIENT
Start: 2024-08-01

## 2024-08-01 NOTE — TELEPHONE ENCOUNTER
Please see the attached refill request.  
Detail Level: Simple
Plan: Since patient is at school in [place], [place], recommended patient start care with a dermatologist near school to start accutane.
Continue Regimen: Apply a dollop of Clindamycin foam to back every morning.\\nApply a pea size amount of adapalene 0.3 % gel to entire face nightly as tolerated.\\nApply a half a pump of Fabior 0.1 % foam to the upper back and shoulders every every night as tolerated.

## 2024-08-13 DIAGNOSIS — M81.0 OSTEOPOROSIS, UNSPECIFIED OSTEOPOROSIS TYPE, UNSPECIFIED PATHOLOGICAL FRACTURE PRESENCE: Primary | ICD-10-CM

## 2024-08-13 DIAGNOSIS — K59.00 CONSTIPATION, UNSPECIFIED CONSTIPATION TYPE: ICD-10-CM

## 2024-08-13 RX ORDER — LUBIPROSTONE 24 UG/1
24 CAPSULE ORAL 2 TIMES DAILY WITH MEALS
Qty: 180 CAPSULE | Refills: 1 | OUTPATIENT
Start: 2024-08-13

## 2024-08-14 ENCOUNTER — HOSPITAL ENCOUNTER (OUTPATIENT)
Dept: RADIOLOGY | Facility: HOSPITAL | Age: 69
Discharge: HOME | End: 2024-08-14
Attending: NURSE PRACTITIONER
Payer: COMMERCIAL

## 2024-08-14 DIAGNOSIS — Z13.820 ENCOUNTER FOR OSTEOPOROSIS SCREENING IN ASYMPTOMATIC POSTMENOPAUSAL PATIENT: ICD-10-CM

## 2024-08-14 DIAGNOSIS — Z78.0 ENCOUNTER FOR OSTEOPOROSIS SCREENING IN ASYMPTOMATIC POSTMENOPAUSAL PATIENT: ICD-10-CM

## 2024-08-14 PROCEDURE — 77080 DXA BONE DENSITY AXIAL: CPT | Mod: 26,,, | Performed by: RADIOLOGY

## 2024-08-14 PROCEDURE — 77080 DXA BONE DENSITY AXIAL: CPT | Mod: TC

## 2024-08-14 NOTE — TELEPHONE ENCOUNTER
----- Message from TEENA Ross sent at 8/14/2024  2:19 PM CDT -----  Yes  ----- Message -----  From: Edel Pagan LPN  Sent: 8/14/2024   2:15 PM CDT  To: TEENA Ross    Referral to be placed At [facility]?  ----- Message -----  From: Azra Jones FNP  Sent: 8/14/2024  10:30 AM CDT  To: Edel Pagan LPN    Osteoporosis. High risk for fracture. Recommend prolia injections q6 months if patient agreeable and insurance approves

## 2024-08-14 NOTE — TELEPHONE ENCOUNTER
Call made to pt regarding dexa scan results. Pt daughter asnwered phone and verified pt . Informed of results. Daughter voiced understanding and agreed to prolia injections at [facility].

## 2024-08-27 DIAGNOSIS — G89.4 CHRONIC PAIN SYNDROME: ICD-10-CM

## 2024-08-27 RX ORDER — NAPROXEN 500 MG/1
500 TABLET ORAL DAILY PRN
Qty: 30 TABLET | Refills: 0 | Status: SHIPPED | OUTPATIENT
Start: 2024-08-27

## 2024-09-07 DIAGNOSIS — E55.9 VITAMIN D DEFICIENCY: ICD-10-CM

## 2024-09-09 ENCOUNTER — PATIENT OUTREACH (OUTPATIENT)
Facility: HOSPITAL | Age: 69
End: 2024-09-09
Payer: COMMERCIAL

## 2024-09-09 VITALS — DIASTOLIC BLOOD PRESSURE: 79 MMHG | SYSTOLIC BLOOD PRESSURE: 111 MMHG

## 2024-09-09 RX ORDER — ERGOCALCIFEROL 1.25 MG/1
50000 CAPSULE ORAL
Qty: 3 CAPSULE | Refills: 3 | Status: SHIPPED | OUTPATIENT
Start: 2024-09-09

## 2024-09-09 NOTE — PROGRESS NOTES
Population Health Chart Review & Patient Outreach Details    Updates Requested / Reviewed:  [x]  Care Team Updated      Health Maintenance Topics Addressed and Outreach Outcomes / Actions Taken:  Blood Pressure Control [x] Spoke with pt's daughter via phone. She is monitoring blood pressure at home. Last Bp reading on 9/8/24 was 111/79.

## 2024-09-12 ENCOUNTER — TELEPHONE (OUTPATIENT)
Dept: FAMILY MEDICINE | Facility: CLINIC | Age: 69
End: 2024-09-12
Payer: COMMERCIAL

## 2024-09-12 NOTE — PROGRESS NOTES
Subjective:         Patient ID: Xavier Rodriguez is a 68 y.o. female.    Chief Complaint: Joint Pain (All over in joints)        Pain  This is a chronic problem. The current episode started more than 1 year ago. The problem occurs daily. The problem has been waxing and waning. Associated symptoms include arthralgias, headaches and neck pain. Pertinent negatives include no anorexia, change in bowel habit, chest pain, chills, fever, sore throat, swollen glands, urinary symptoms or vertigo.     Review of Systems   Constitutional:  Negative for activity change, appetite change, chills, fever and unexpected weight change.   HENT:  Negative for drooling, ear discharge, facial swelling, mouth dryness, nosebleeds, sore throat, trouble swallowing, voice change and goiter.    Eyes:  Negative for discharge and visual disturbance.   Respiratory:  Negative for apnea, choking, chest tightness, shortness of breath, wheezing and stridor.    Cardiovascular:  Negative for chest pain, palpitations and leg swelling.   Gastrointestinal:  Negative for abdominal distention, anorexia, change in bowel habit, diarrhea, rectal pain and fecal incontinence.   Endocrine: Negative for cold intolerance, heat intolerance, polydipsia, polyphagia and polyuria.   Genitourinary:  Negative for flank pain, frequency and hot flashes.   Musculoskeletal:  Positive for arthralgias, leg pain and neck pain.   Integumentary:  Negative for color change and pallor.   Allergic/Immunologic: Negative for immunocompromised state.   Neurological:  Positive for headaches. Negative for vertigo, syncope, facial asymmetry, speech difficulty, light-headedness, memory loss and coordination difficulties.   Hematological:  Negative for adenopathy. Does not bruise/bleed easily.   Psychiatric/Behavioral:  Negative for agitation, behavioral problems, confusion, decreased concentration, dysphoric mood, hallucinations, self-injury and suicidal ideas. The patient is not  nervous/anxious and is not hyperactive.            Past Medical History:   Diagnosis Date    Chronic low back pain     Depression     Dysfunctional voiding of urine 11/10/2021    Patient states she does not urinate.  She was taken off of lasix which was the only thing that helped her empty. PVR 0 ml Chronic constipation, lifelong.  Has been out of Linzess for a month now. 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    INJECTION OF ANESTHETIC AGENT AROUND MEDIAL BRANCH NERVES INNERVATING LUMBAR FACET JOINT Bilateral 2023    Procedure: Bilateral L4-5,5-S1 MBB;  Surgeon: Ilsa Mendez MD;  [facility];  Service: Pain Management;  Laterality: Bilateral;    TUMOR REMOVAL       Social History     Socioeconomic History    Marital status: Single   Tobacco Use    Smoking status: Former     Current packs/day: 0.00     Average packs/day: 0.5 packs/day for 20.0 years (10.0 ttl pk-yrs)     Types: Cigarettes     Start date:      Quit date:      Years since quittin.7     Passive exposure: Past    Smokeless tobacco: Never    Tobacco comments:     Stop smoking about 15 years ago   Substance and Sexual Activity    Alcohol use: Never    Drug use: Yes     Types: Hydrocodone    Sexual activity: Not Currently     Partners: Male     Birth control/protection: None     Social Determinants of Health     Financial Resource Strain: Low Risk  (2024)    Overall Financial Resource Strain (CARDIA)     Difficulty of Paying Living Expenses: Not very hard   Food Insecurity: No Food Insecurity (2024)    Hunger Vital Sign     Worried About Running Out of Food in the Last Year: Never true     Ran Out of Food in the Last Year: Never true   Transportation Needs: No Transportation Needs (2024)    PRAPARE -  "Transportation     Lack of Transportation (Medical): No     Lack of Transportation (Non-Medical): No   Physical Activity: Inactive (7/2/2024)    Exercise Vital Sign     Days of Exercise per Week: 0 days     Minutes of Exercise per Session: 0 min   Stress: No Stress Concern Present (7/2/2024)    [employer] [place] [employer] - Occupational Stress Questionnaire     Feeling of Stress : Only a little   Housing Stability: Low Risk  (7/2/2024)    Housing Stability Vital Sign     Unable to Pay for Housing in the Last Year: No     Homeless in the Last Year: No     Family History   Problem Relation Name Age of Onset    Heart disease Mother Telma Rodriguez     Heart attack Mother Telma Rodriguez     Heart disease Father Alfonso Rodriguez         Heart attack    Heart attack Father Alfonso Rodriguez      Review of patient's allergies indicates:  No Known Allergies     Objective:  Vitals:    09/24/24 1028   BP: 125/75   Pulse: 63   Resp: 18   Weight: 72.6 kg (160 lb)   Height: 5' 4" (1.626 m)   PainSc: 10-Worst pain ever             Physical Exam  Vitals and nursing note reviewed. Exam conducted with a chaperone present.   Constitutional:       General: She is awake. She is not in acute distress.     Appearance: Normal appearance. She is not ill-appearing, toxic-appearing or diaphoretic.   HENT:      Head: Normocephalic and atraumatic.      Nose: Nose normal.      Mouth/Throat:      Mouth: Mucous membranes are moist.      Pharynx: Oropharynx is clear.   Eyes:      Conjunctiva/sclera: Conjunctivae normal.      Pupils: Pupils are equal, round, and reactive to light.   Cardiovascular:      Rate and Rhythm: Normal rate.   Pulmonary:      Effort: Pulmonary effort is normal. No respiratory distress.   Abdominal:      Palpations: Abdomen is soft.      Tenderness: There is no guarding.   Musculoskeletal:         General: Normal range of motion.      Cervical back: Normal range of motion and neck supple. No rigidity.      Thoracic back: " Tenderness present.      Lumbar back: Tenderness present.   Skin:     General: Skin is warm and dry.      Coloration: Skin is not jaundiced or pale.   Neurological:      General: No focal deficit present.      Mental Status: She is alert and oriented to person, place, and time. Mental status is at baseline.      Cranial Nerves: No cranial nerve deficit (II-XII).   Psychiatric:         Mood and Affect: Mood normal.         Behavior: Behavior normal. Behavior is cooperative.         Thought Content: Thought content normal.           DXA Bone Density Axial Skeleton 1 or more sites  Narrative: EXAMINATION:  DXA BONE DENSITY AXIAL SKELETON 1 OR MORE SITES    CLINICAL HISTORY:  Encounter for screening for osteoporosis    TECHNIQUE:  Bone densitometry performed    COMPARISON:  None available    FINDINGS:  Vertebral body T score measurements are estimated at -2.8    Femoral neck T score measurements were estimated at -2.1    Estimated 10 year fracture rate 5.2%  Impression: Findings fall within the osteoporosis range  high fracture risk.    Electronically signed by: Tony Nina  Date:    08/14/2024  Time:    10:21         Office Visit on 06/26/2024   Component Date Value Ref Range Status    POC Amphetamines 06/26/2024 Negative  Negative, Inconclusive Final    POC Barbiturates 06/26/2024 Negative  Negative, Inconclusive Final    POC Benzodiazepines 06/26/2024 Negative  Negative, Inconclusive Final    POC Cocaine 06/26/2024 Negative  Negative, Inconclusive Final    POC THC 06/26/2024 Negative  Negative, Inconclusive Final    POC Methadone 06/26/2024 Negative  Negative, Inconclusive Final    POC Methamphetamine 06/26/2024 Negative  Negative, Inconclusive Final    POC Opiates 06/26/2024 Negative  Negative, Inconclusive Final    POC Oxycodone 06/26/2024 Negative  Negative, Inconclusive Final    POC Phencyclidine 06/26/2024 Negative  Negative, Inconclusive Final    POC Methylenedioxymethamphetamine * 06/26/2024 Negative   Negative, Inconclusive Final    POC Tricyclic Antidepressants 06/26/2024 Negative  Negative, Inconclusive Final    POC Buprenorphine 06/26/2024 Negative   Final     Acceptable 06/26/2024 Yes   Final    POC Temperature (Urine) 06/26/2024 90   Final   Lab Visit on 06/06/2024   Component Date Value Ref Range Status    Ammonia 06/06/2024 21  11 - 32 µmol/L Final    Sodium 06/06/2024 143  136 - 145 mmol/L Final    Potassium 06/06/2024 4.3  3.5 - 5.1 mmol/L Final    Chloride 06/06/2024 112 (H)  98 - 107 mmol/L Final    CO2 06/06/2024 31  21 - 32 mmol/L Final    Anion Gap 06/06/2024 4 (L)  7 - 16 mmol/L Final    Glucose 06/06/2024 105  74 - 106 mg/dL Final    BUN 06/06/2024 11  7 - 18 mg/dL Final    Creatinine 06/06/2024 0.90  0.55 - 1.02 mg/dL Final    BUN/Creatinine Ratio 06/06/2024 12  6 - 20 Final    Calcium 06/06/2024 8.8  8.5 - 10.1 mg/dL Final    Total Protein 06/06/2024 7.4  6.4 - 8.2 g/dL Final    Albumin 06/06/2024 3.8  3.5 - 5.0 g/dL Final    Globulin 06/06/2024 3.6  2.0 - 4.0 g/dL Final    A/G Ratio 06/06/2024 1.1   Final    Bilirubin, Total 06/06/2024 1.0  >0.0 - 1.2 mg/dL Final    Alk Phos 06/06/2024 63  55 - 142 U/L Final    ALT 06/06/2024 10 (L)  13 - 56 U/L Final    AST 06/06/2024 9 (L)  15 - 37 U/L Final    eGFR 06/06/2024 69  >=60 mL/min/1.73m2 Final    Folate 06/06/2024 3.0 (L)  3.1 - 17.5 ng/mL Final    Vitamin D 25-Hydroxy, Blood 06/06/2024 6.4  ng/mL Final    Vitamin B12 06/06/2024 288  193 - 986 pg/mL Final    TSH 06/06/2024 0.982  0.358 - 3.740 uIU/mL Final    WBC 06/06/2024 4.97  4.50 - 11.00 K/uL Final    RBC 06/06/2024 3.89 (L)  4.20 - 5.40 M/uL Final    Hemoglobin 06/06/2024 11.3 (L)  12.0 - 16.0 g/dL Final    Hematocrit 06/06/2024 36.2 (L)  38.0 - 47.0 % Final    MCV 06/06/2024 93.1  80.0 - 96.0 fL Final    MCH 06/06/2024 29.0  27.0 - 31.0 pg Final    MCHC 06/06/2024 31.2 (L)  32.0 - 36.0 g/dL Final    RDW 06/06/2024 15.3 (H)  11.5 - 14.5 % Final    Platelet Count 06/06/2024 263   150 - 400 K/uL Final    MPV 06/06/2024 11.6  9.4 - 12.4 fL Final    Neutrophils % 06/06/2024 45.3 (L)  53.0 - 65.0 % Final    Lymphocytes % 06/06/2024 43.7 (H)  27.0 - 41.0 % Final    Monocytes % 06/06/2024 7.8 (H)  2.0 - 6.0 % Final    Eosinophils % 06/06/2024 0.0 (L)  1.0 - 4.0 % Final    Basophils % 06/06/2024 0.0  0.0 - 1.0 % Final    Immature Granulocytes % 06/06/2024 3.2 (H)  0.0 - 0.4 % Final    nRBC, Auto 06/06/2024 0.0  <=0.0 % Final    Neutrophils, Abs 06/06/2024 2.25  1.80 - 7.70 K/uL Final    Lymphocytes, Absolute 06/06/2024 2.17  1.00 - 4.80 K/uL Final    Monocytes, Absolute 06/06/2024 0.39  0.00 - 0.80 K/uL Final    Eosinophils, Absolute 06/06/2024 0.00  0.00 - 0.50 K/uL Final    Basophils, Absolute 06/06/2024 0.00  0.00 - 0.20 K/uL Final    Immature Granulocytes, Absolute 06/06/2024 0.16 (H)  0.00 - 0.04 K/uL Final    nRBC, Absolute 06/06/2024 0.00  <=0.00 x10e3/uL Final    Diff Type 06/06/2024 Manual   Final    Segmented Neutrophils, Man % 06/06/2024 54  50 - 62 % Final    Lymphocytes, Man % 06/06/2024 45 (H)  27 - 41 % Final    Monocytes, Man % 06/06/2024 1 (L)  2 - 6 % Final    Platelet Morphology 06/06/2024 Normal  Normal Final    Anisocytosis 06/06/2024 1+   Final    Ovalocytes 06/06/2024 1+   Final    Reactive Lymphocytes 06/06/2024 Few   Final   Office Visit on 04/22/2024   Component Date Value Ref Range Status    Color, UA 04/22/2024 Brown   Final    Spec Grav UA 04/22/2024 1.025   Final    pH, UA 04/22/2024 5.5   Final    WBC, UA 04/22/2024 sm   Final    Nitrite, UA 04/22/2024 neg   Final    Protein, POC 04/22/2024 neg   Final    Glucose, UA 04/22/2024 neg   Final    Ketones, UA 04/22/2024 tr   Final    Bilirubin, POC 04/22/2024 neg   Final    Urobilinogen, UA 04/22/2024 1.0   Final    Blood, UA 04/22/2024 neg   Final    Color, UA 04/22/2024 Yellow  Colorless, Straw, Yellow, Light Yellow, Dark Yellow Final    Clarity, UA 04/22/2024 Clear  Clear Final    pH, UA 04/22/2024 5.5  5.0 to 8.0 pH  "Units Final    Leukocytes, UA 04/22/2024 Large (A)  Negative Final    Nitrites, UA 04/22/2024 Negative  Negative Final    Protein, UA 04/22/2024 10 (A)  Negative Final    Glucose, UA 04/22/2024 Normal  Normal mg/dL Final    Ketones, UA 04/22/2024 Negative  Negative mg/dL Final    Urobilinogen, UA 04/22/2024 2 (A)  0.2, 1.0, Normal mg/dL Final    Bilirubin, UA 04/22/2024 Negative  Negative Final    Blood, UA 04/22/2024 Negative  Negative Final    Specific Gravity, UA 04/22/2024 1.023  <=1.030 Final    Culture, Urine 04/22/2024 84,000 Streptococcus agalactiae (Group B) (A)   Final    WBC, UA 04/22/2024 2  <=5 /hpf Final    RBC, UA 04/22/2024 2  <=3 /hpf Final    Squamous Epithelial Cells, UA 04/22/2024 Occasional (A)  None Seen /HPF Final    Mucous 04/22/2024 Occasional (A)  None Seen /LPF Final         Orders Placed This Encounter   Procedures    WHEELCHAIR FOR HOME USE     Order Specific Question:   Hours in W/C per day:     Answer:   8     Order Specific Question:   Type of Wheelchair:     Answer:   Standard     Order Specific Question:   Size(Width):     Answer:   18"(STD adult)     Order Specific Question:   Leg Support:     Answer:   STD footrests     Order Specific Question:   Lap Belt:     Answer:   Velcro     Order Specific Question:   Accessories:     Answer:   Front brakes     Order Specific Question:   Cushion:     Answer:   Basic     Order Specific Question:   Height:     Answer:   5' 4" (1.626 m)     Order Specific Question:   Weight:     Answer:   72.6 kg (160 lb)     Order Specific Question:   Length of need (1-99 months):     Answer:   99     Order Specific Question:   Please check all that apply:     Answer:   Caregiver is capable and willing to operate wheelchair safely.     Order Specific Question:   Please check all that apply:     Answer:   Patient's upper body strength is sufficient for propulsion.     Order Specific Question:   Please check all that apply:     Answer:   The patient requires the " use of a w/c for activities of daily living within the Home.     Order Specific Question:   Please check all that apply:     Answer:   Patient mobility limitations cannot be sufficiently resolved by the use of other ambulatory therapies.     Order Specific Question:   Reclining Back     Answer:   Yes     Order Specific Question:   Headrest:     Answer:   No    Ambulatory referral/consult to Home Health     Standing Status:   Future     Standing Expiration Date:   10/24/2025     Referral Priority:   Routine     Referral Type:   Home Health     Referral Reason:   Specialty Services Required     Referred to Provider:   [facility] -     Requested Specialty:   Home Health Services     Number of Visits Requested:   1       Requested Prescriptions     Signed Prescriptions Disp Refills    HYDROcodone-acetaminophen (NORCO) 7.5-325 mg per tablet 30 tablet 0     Sig: Take 1 tablet by mouth once daily.    HYDROcodone-acetaminophen (NORCO) 7.5-325 mg per tablet 30 tablet 0     Sig: Take 1 tablet by mouth once daily.    HYDROcodone-acetaminophen (NORCO) 7.5-325 mg per tablet 30 tablet 0     Sig: Take 1 tablet by mouth once daily.       Assessment:     1. Lumbosacral radiculopathy    2. Disorder of sacrum    3. Bilateral hand pain    4. Polyarthralgia             A's of Opioid Risk Assessment  Activity:Patient can perform ADL.   Analgesia:Patients pain is partially controlled by current medication. Patient has tried OTC medications such as Tylenol and Ibuprofen with out relief.   Adverse Effects: Patient denies constipation or sedation.  Aberrant Behavior:  reviewed with no aberrant drug seeking/taking behavior.  Overdose reversal drug naloxone discussed    Drug screen reviewed      Plan:    Narcan June 2024    She had Bilateral lumbar L4 through S1 medial branch block # 1, April 13, 2023 she states she had 100% relief after procedure, the procedure did help improve her level of function      Daughter monitors  her mother's medication    Chronic abdominal pain, now having increasing joint pain decreasing mobility     Requesting options     Difficulty ambulating due to joint back pain     Will give patient prescription for wheelchair she has difficulty using Rollator walker due to her hand pain     Will start home health evaluation physical therapy occupational therapy joint pain back pain bilateral hand pain     She states her current medication 1 time per day is helping control her chronic joint back pain     The patient would like to continue current medications she states it does help with her discomfort    Continue home exercise program as directed    Continue current medication    Follow-up 3 months    Dr. Mendez, April 2024    Bring original prescription medication bottles/container/box with labels to each visit

## 2024-09-12 NOTE — TELEPHONE ENCOUNTER
Attempted to contact pt daughter in regards to pt needing a wheelchair. Pt needs an appt for wheelchair order. Unable to reach pt daughter. Was able to leave message for pt daughter to return phone call.

## 2024-09-24 ENCOUNTER — OFFICE VISIT (OUTPATIENT)
Dept: PAIN MEDICINE | Facility: CLINIC | Age: 69
End: 2024-09-24
Payer: COMMERCIAL

## 2024-09-24 VITALS
HEART RATE: 63 BPM | HEIGHT: 64 IN | RESPIRATION RATE: 18 BRPM | WEIGHT: 160 LBS | SYSTOLIC BLOOD PRESSURE: 125 MMHG | BODY MASS INDEX: 27.31 KG/M2 | DIASTOLIC BLOOD PRESSURE: 75 MMHG

## 2024-09-24 DIAGNOSIS — M79.641 BILATERAL HAND PAIN: Chronic | ICD-10-CM

## 2024-09-24 DIAGNOSIS — M79.642 BILATERAL HAND PAIN: Chronic | ICD-10-CM

## 2024-09-24 DIAGNOSIS — M54.17 LUMBOSACRAL RADICULOPATHY: Primary | Chronic | ICD-10-CM

## 2024-09-24 DIAGNOSIS — M25.50 POLYARTHRALGIA: Chronic | ICD-10-CM

## 2024-09-24 DIAGNOSIS — M53.3 DISORDER OF SACRUM: Chronic | ICD-10-CM

## 2024-09-24 PROCEDURE — 1159F MED LIST DOCD IN RCRD: CPT | Mod: CPTII,,, | Performed by: PHYSICIAN ASSISTANT

## 2024-09-24 PROCEDURE — 99999 PR PBB SHADOW E&M-EST. PATIENT-LVL IV: CPT | Mod: PBBFAC,,, | Performed by: PHYSICIAN ASSISTANT

## 2024-09-24 PROCEDURE — 3288F FALL RISK ASSESSMENT DOCD: CPT | Mod: CPTII,,, | Performed by: PHYSICIAN ASSISTANT

## 2024-09-24 PROCEDURE — 99214 OFFICE O/P EST MOD 30 MIN: CPT | Mod: S$PBB,,, | Performed by: PHYSICIAN ASSISTANT

## 2024-09-24 PROCEDURE — 4010F ACE/ARB THERAPY RXD/TAKEN: CPT | Mod: CPTII,,, | Performed by: PHYSICIAN ASSISTANT

## 2024-09-24 PROCEDURE — 3078F DIAST BP <80 MM HG: CPT | Mod: CPTII,,, | Performed by: PHYSICIAN ASSISTANT

## 2024-09-24 PROCEDURE — 3008F BODY MASS INDEX DOCD: CPT | Mod: CPTII,,, | Performed by: PHYSICIAN ASSISTANT

## 2024-09-24 PROCEDURE — 99214 OFFICE O/P EST MOD 30 MIN: CPT | Mod: PBBFAC | Performed by: PHYSICIAN ASSISTANT

## 2024-09-24 PROCEDURE — 3074F SYST BP LT 130 MM HG: CPT | Mod: CPTII,,, | Performed by: PHYSICIAN ASSISTANT

## 2024-09-24 PROCEDURE — 1101F PT FALLS ASSESS-DOCD LE1/YR: CPT | Mod: CPTII,,, | Performed by: PHYSICIAN ASSISTANT

## 2024-09-24 PROCEDURE — 1125F AMNT PAIN NOTED PAIN PRSNT: CPT | Mod: CPTII,,, | Performed by: PHYSICIAN ASSISTANT

## 2024-09-24 RX ORDER — HYDROCODONE BITARTRATE AND ACETAMINOPHEN 7.5; 325 MG/1; MG/1
1 TABLET ORAL DAILY
Qty: 30 TABLET | Refills: 0 | Status: SHIPPED | OUTPATIENT
Start: 2024-09-28

## 2024-09-24 RX ORDER — HYDROCODONE BITARTRATE AND ACETAMINOPHEN 7.5; 325 MG/1; MG/1
1 TABLET ORAL DAILY
Qty: 30 TABLET | Refills: 0 | Status: SHIPPED | OUTPATIENT
Start: 2024-10-28

## 2024-09-24 RX ORDER — HYDROCODONE BITARTRATE AND ACETAMINOPHEN 7.5; 325 MG/1; MG/1
1 TABLET ORAL DAILY
Qty: 30 TABLET | Refills: 0 | Status: SHIPPED | OUTPATIENT
Start: 2024-11-27

## 2024-10-01 DIAGNOSIS — I10 PRIMARY HYPERTENSION: ICD-10-CM

## 2024-10-01 RX ORDER — LOSARTAN POTASSIUM 25 MG/1
25 TABLET ORAL
Qty: 30 TABLET | Refills: 1 | Status: SHIPPED | OUTPATIENT
Start: 2024-10-01

## 2024-10-10 ENCOUNTER — OFFICE VISIT (OUTPATIENT)
Dept: FAMILY MEDICINE | Facility: CLINIC | Age: 69
End: 2024-10-10
Payer: COMMERCIAL

## 2024-10-10 VITALS
BODY MASS INDEX: 26.46 KG/M2 | RESPIRATION RATE: 18 BRPM | OXYGEN SATURATION: 99 % | SYSTOLIC BLOOD PRESSURE: 117 MMHG | DIASTOLIC BLOOD PRESSURE: 82 MMHG | HEART RATE: 68 BPM | TEMPERATURE: 99 F | HEIGHT: 64 IN | WEIGHT: 155 LBS

## 2024-10-10 DIAGNOSIS — R26.9 ABNORMAL GAIT: ICD-10-CM

## 2024-10-10 DIAGNOSIS — R53.1 WEAKNESS: ICD-10-CM

## 2024-10-10 DIAGNOSIS — E55.9 VITAMIN D DEFICIENCY: Chronic | ICD-10-CM

## 2024-10-10 DIAGNOSIS — E87.6 HYPOKALEMIA: ICD-10-CM

## 2024-10-10 DIAGNOSIS — G89.4 CHRONIC PAIN SYNDROME: ICD-10-CM

## 2024-10-10 DIAGNOSIS — M25.50 POLYARTHRALGIA: Primary | ICD-10-CM

## 2024-10-10 DIAGNOSIS — E78.5 HYPERLIPIDEMIA, UNSPECIFIED HYPERLIPIDEMIA TYPE: ICD-10-CM

## 2024-10-10 LAB
25(OH)D3 SERPL-MCNC: 81.3 NG/ML
ALBUMIN SERPL BCP-MCNC: 3.5 G/DL (ref 3.5–5)
ALBUMIN/GLOB SERPL: 0.9 {RATIO}
ALP SERPL-CCNC: 89 U/L (ref 55–142)
ALT SERPL W P-5'-P-CCNC: 6 U/L (ref 13–56)
ANION GAP SERPL CALCULATED.3IONS-SCNC: 13 MMOL/L (ref 7–16)
AST SERPL W P-5'-P-CCNC: 11 U/L (ref 15–37)
BASOPHILS # BLD AUTO: 0.01 K/UL (ref 0–0.2)
BASOPHILS NFR BLD AUTO: 0.2 % (ref 0–1)
BILIRUB SERPL-MCNC: 1.4 MG/DL (ref ?–1.2)
BUN SERPL-MCNC: 9 MG/DL (ref 7–18)
BUN/CREAT SERPL: 14 (ref 6–20)
CALCIUM SERPL-MCNC: 9.3 MG/DL (ref 8.5–10.1)
CHLORIDE SERPL-SCNC: 101 MMOL/L (ref 98–107)
CHOLEST SERPL-MCNC: 169 MG/DL (ref 0–200)
CHOLEST/HDLC SERPL: 3.4 {RATIO}
CO2 SERPL-SCNC: 27 MMOL/L (ref 21–32)
CREAT SERPL-MCNC: 0.65 MG/DL (ref 0.55–1.02)
CRP SERPL-MCNC: 1.99 MG/DL (ref 0–0.8)
DIFFERENTIAL METHOD BLD: ABNORMAL
EGFR (NO RACE VARIABLE) (RUSH/TITUS): 95 ML/MIN/1.73M2
EOSINOPHIL # BLD AUTO: 0.01 K/UL (ref 0–0.5)
EOSINOPHIL NFR BLD AUTO: 0.2 % (ref 1–4)
ERYTHROCYTE [DISTWIDTH] IN BLOOD BY AUTOMATED COUNT: 14.6 % (ref 11.5–14.5)
ERYTHROCYTE [SEDIMENTATION RATE] IN BLOOD BY WESTERGREN METHOD: 52 MM/HR (ref 0–30)
GLOBULIN SER-MCNC: 3.8 G/DL (ref 2–4)
GLUCOSE SERPL-MCNC: 96 MG/DL (ref 74–106)
HCT VFR BLD AUTO: 39 % (ref 38–47)
HDLC SERPL-MCNC: 49 MG/DL (ref 40–60)
HGB BLD-MCNC: 12.2 G/DL (ref 12–16)
IMM GRANULOCYTES # BLD AUTO: 0.08 K/UL (ref 0–0.04)
IMM GRANULOCYTES NFR BLD: 1.6 % (ref 0–0.4)
LDLC SERPL CALC-MCNC: 95 MG/DL
LDLC/HDLC SERPL: 1.9 {RATIO}
LYMPHOCYTES # BLD AUTO: 1.52 K/UL (ref 1–4.8)
LYMPHOCYTES NFR BLD AUTO: 29.9 % (ref 27–41)
MCH RBC QN AUTO: 26.8 PG (ref 27–31)
MCHC RBC AUTO-ENTMCNC: 31.3 G/DL (ref 32–36)
MCV RBC AUTO: 85.7 FL (ref 80–96)
MONOCYTES # BLD AUTO: 0.33 K/UL (ref 0–0.8)
MONOCYTES NFR BLD AUTO: 6.5 % (ref 2–6)
MPC BLD CALC-MCNC: 11.1 FL (ref 9.4–12.4)
NEUTROPHILS # BLD AUTO: 3.13 K/UL (ref 1.8–7.7)
NEUTROPHILS NFR BLD AUTO: 61.6 % (ref 53–65)
NONHDLC SERPL-MCNC: 120 MG/DL
NRBC # BLD AUTO: 0 X10E3/UL
NRBC, AUTO (.00): 0 %
PLATELET # BLD AUTO: 346 K/UL (ref 150–400)
POTASSIUM SERPL-SCNC: 3.4 MMOL/L (ref 3.5–5.1)
PROT SERPL-MCNC: 7.3 G/DL (ref 6.4–8.2)
RBC # BLD AUTO: 4.55 M/UL (ref 4.2–5.4)
RHEUMATOID FACT SER NEPH-ACNC: <10 IU/ML (ref 0–15)
SODIUM SERPL-SCNC: 138 MMOL/L (ref 136–145)
TRIGL SERPL-MCNC: 127 MG/DL (ref 35–150)
VLDLC SERPL-MCNC: 25 MG/DL
WBC # BLD AUTO: 5.08 K/UL (ref 4.5–11)

## 2024-10-10 PROCEDURE — 4010F ACE/ARB THERAPY RXD/TAKEN: CPT | Mod: ,,, | Performed by: NURSE PRACTITIONER

## 2024-10-10 PROCEDURE — 86140 C-REACTIVE PROTEIN: CPT | Mod: ,,, | Performed by: CLINICAL MEDICAL LABORATORY

## 2024-10-10 PROCEDURE — 1160F RVW MEDS BY RX/DR IN RCRD: CPT | Mod: ,,, | Performed by: NURSE PRACTITIONER

## 2024-10-10 PROCEDURE — 80053 COMPREHEN METABOLIC PANEL: CPT | Mod: ,,, | Performed by: CLINICAL MEDICAL LABORATORY

## 2024-10-10 PROCEDURE — 1159F MED LIST DOCD IN RCRD: CPT | Mod: ,,, | Performed by: NURSE PRACTITIONER

## 2024-10-10 PROCEDURE — 3074F SYST BP LT 130 MM HG: CPT | Mod: ,,, | Performed by: NURSE PRACTITIONER

## 2024-10-10 PROCEDURE — 3288F FALL RISK ASSESSMENT DOCD: CPT | Mod: ,,, | Performed by: NURSE PRACTITIONER

## 2024-10-10 PROCEDURE — 1125F AMNT PAIN NOTED PAIN PRSNT: CPT | Mod: ,,, | Performed by: NURSE PRACTITIONER

## 2024-10-10 PROCEDURE — 85651 RBC SED RATE NONAUTOMATED: CPT | Mod: ,,, | Performed by: CLINICAL MEDICAL LABORATORY

## 2024-10-10 PROCEDURE — 3008F BODY MASS INDEX DOCD: CPT | Mod: ,,, | Performed by: NURSE PRACTITIONER

## 2024-10-10 PROCEDURE — 80061 LIPID PANEL: CPT | Mod: ,,, | Performed by: CLINICAL MEDICAL LABORATORY

## 2024-10-10 PROCEDURE — 1101F PT FALLS ASSESS-DOCD LE1/YR: CPT | Mod: ,,, | Performed by: NURSE PRACTITIONER

## 2024-10-10 PROCEDURE — 86431 RHEUMATOID FACTOR QUANT: CPT | Mod: ,,, | Performed by: CLINICAL MEDICAL LABORATORY

## 2024-10-10 PROCEDURE — 82306 VITAMIN D 25 HYDROXY: CPT | Mod: ,,, | Performed by: CLINICAL MEDICAL LABORATORY

## 2024-10-10 PROCEDURE — 3079F DIAST BP 80-89 MM HG: CPT | Mod: ,,, | Performed by: NURSE PRACTITIONER

## 2024-10-10 PROCEDURE — 99214 OFFICE O/P EST MOD 30 MIN: CPT | Mod: ,,, | Performed by: NURSE PRACTITIONER

## 2024-10-10 PROCEDURE — 85025 COMPLETE CBC W/AUTO DIFF WBC: CPT | Mod: ,,, | Performed by: CLINICAL MEDICAL LABORATORY

## 2024-10-10 RX ORDER — POTASSIUM CHLORIDE 20 MEQ/1
20 TABLET, EXTENDED RELEASE ORAL DAILY
Qty: 90 TABLET | Refills: 1 | Status: SHIPPED | OUTPATIENT
Start: 2024-10-10

## 2024-10-10 RX ORDER — NAPROXEN 500 MG/1
500 TABLET ORAL DAILY PRN
Qty: 30 TABLET | Refills: 0 | Status: SHIPPED | OUTPATIENT
Start: 2024-10-10

## 2024-10-10 NOTE — PROGRESS NOTES
Clinic Note    Xavier Rodriguez is a 68 y.o. female     Chief Complaint:   Chief Complaint   Patient presents with    Pain     Pt reports body pains on and off for about 2 months.         Subjective:    Patient comes in today with daughter. Patient reports pain all over but most prominent in joints. Daughter states she could touch her mom anywhere and she'd cry out in pain. Admits to chronic pain but this pain has been present for 2 months. Daughter states 2 weeks ago patient could not even move/use her hands. Denies injury or strain. Denies swelling. Having difficulty ambulating due to pain.   Patient last visit with pain tx was 1 month prior. Order HH but has not been evaluated yet.   Patient sees neurology for headache and dementia.         Allergies:   Review of patient's allergies indicates:  No Known Allergies     Past Medical History:  Past Medical History:   Diagnosis Date    Chronic low back pain     Depression     Dysfunctional voiding of urine 11/10/2021    Patient states she does not urinate.  She was taken off of lasix which was the only thing that helped her empty. PVR 0 ml Chronic constipation, lifelong.  Has been out of Linzess for a month now. Renal function WNL when last checked in May    Edema     Hyperlipidemia     Hypertension     IBS (irritable bowel syndrome)         Current Medications:    Current Outpatient Medications:     ergocalciferol (ERGOCALCIFEROL) 50,000 unit Cap, Take 1 capsule (50,000 Units total) by mouth every 30 days., Disp: 3 capsule, Rfl: 3    furosemide (LASIX) 20 MG tablet, TAKE 1 TABLET BY MOUTH EVERY DAY AS NEEDED, Disp: 30 tablet, Rfl: 0    [START ON 11/27/2024] HYDROcodone-acetaminophen (NORCO) 7.5-325 mg per tablet, Take 1 tablet by mouth once daily., Disp: 30 tablet, Rfl: 0    [START ON 10/28/2024] HYDROcodone-acetaminophen (NORCO) 7.5-325 mg per tablet, Take 1 tablet by mouth once daily., Disp: 30 tablet, Rfl: 0    linaCLOtide (LINZESS) 145 mcg Cap capsule, Take 1  "capsule by mouth., Disp: , Rfl:     losartan (COZAAR) 25 MG tablet, TAKE 1 TABLET BY MOUTH EVERY DAY, Disp: 30 tablet, Rfl: 1    lovastatin (MEVACOR) 20 MG tablet, TAKE 1 TABLET BY MOUTH EVERY DAY IN THE EVENING, Disp: 90 tablet, Rfl: 1    tamsulosin (FLOMAX) 0.4 mg Cap, Take 1 capsule (0.4 mg total) by mouth once daily., Disp: 90 capsule, Rfl: 1    HYDROcodone-acetaminophen (NORCO) 7.5-325 mg per tablet, Take 1 tablet by mouth once daily. (Patient not taking: Reported on 10/10/2024), Disp: 30 tablet, Rfl: 0    naproxen (NAPROSYN) 500 MG tablet, Take 1 tablet (500 mg total) by mouth daily as needed., Disp: 30 tablet, Rfl: 0    potassium chloride SA (K-DUR,KLOR-CON) 20 MEQ tablet, Take 1 tablet (20 mEq total) by mouth once daily., Disp: 90 tablet, Rfl: 1       Review of Systems   Constitutional:  Negative for fever.   Respiratory:  Negative for cough and shortness of breath.    Cardiovascular:  Negative for chest pain, palpitations and leg swelling.   Gastrointestinal:  Negative for abdominal pain, constipation, diarrhea, nausea and vomiting.   Genitourinary:  Negative for dysuria.   Musculoskeletal:  Positive for arthralgias, back pain and gait problem. Negative for joint swelling.   Neurological:  Negative for headaches.          Objective:    /82   Pulse 68   Temp 98.8 °F (37.1 °C) (Oral)   Resp 18   Ht 5' 4" (1.626 m)   Wt 70.3 kg (155 lb)   SpO2 99%   BMI 26.61 kg/m²      Physical Exam  Constitutional:       Appearance: Normal appearance.   Eyes:      Extraocular Movements: Extraocular movements intact.   Cardiovascular:      Rate and Rhythm: Normal rate and regular rhythm.      Pulses: Normal pulses.      Heart sounds: Normal heart sounds.   Pulmonary:      Effort: Pulmonary effort is normal.      Breath sounds: Normal breath sounds.   Abdominal:      Palpations: Abdomen is soft.      Tenderness: There is no abdominal tenderness. There is no guarding or rebound.   Musculoskeletal:         General: " No swelling.      Right lower leg: No edema.      Left lower leg: No edema.      Comments: Sitting up in wheelchair. Unable to straighten arms due to pain in elbows. No swelling of joints. Generally decrease rom of joints   Neurological:      Mental Status: She is alert. Mental status is at baseline.      Gait: Gait abnormal.      Comments: Chronic dementia   Psychiatric:         Mood and Affect: Mood normal.          Assessment and Plan:    1. Polyarthralgia    2. Hypokalemia    3. Chronic pain syndrome    4. Weakness    5. Abnormal gait    6. Hyperlipidemia, unspecified hyperlipidemia type    7. Vitamin D deficiency         Polyarthralgia  -     PURNIMA EIA w/ Reflex to dsDNA/JOSEE; Future; Expected date: 10/10/2024  -     Rheumatoid Quantitative; Future; Expected date: 10/10/2024  -     C-Reactive Protein; Future; Expected date: 10/10/2024  -     Sedimentation Rate; Future; Expected date: 10/10/2024  -     CBC Auto Differential; Future; Expected date: 10/10/2024  -f/u with pain treatment as scheduled    Hypokalemia  -     potassium chloride SA (K-DUR,KLOR-CON) 20 MEQ tablet; Take 1 tablet (20 mEq total) by mouth once daily.  Dispense: 90 tablet; Refill: 1  -     Comprehensive Metabolic Panel; Future; Expected date: 10/10/2024    Chronic pain syndrome  -     naproxen (NAPROSYN) 500 MG tablet; Take 1 tablet (500 mg total) by mouth daily as needed.  Dispense: 30 tablet; Refill: 0    Weakness  -abnormality aids as needed  -faxed HH referral    Abnormal gait  -continue to use ambulatory aides as needed    Hyperlipidemia, unspecified hyperlipidemia type  -     Lipid Panel; Future; Expected date: 10/10/2024  -low cholesterol diet    Vitamin D deficiency  -     Vitamin D; Future; Expected date: 10/10/2024          There are no Patient Instructions on file for this visit.   Follow up if symptoms worsen or fail to improve.      Statement Selected

## 2024-10-11 NOTE — PATIENT INSTRUCTIONS
used Patient Education       Radiculopathy Discharge Instructions   About this topic   Nerves leave the spine and go out to the body. Sometimes, a nerve is damaged where it leaves the spine. This is called radiculopathy. You may have pain and weakness of your arms or legs. You may also have numbness or tingling. These signs are caused by the nerve being pinched or squeezed. You may notice problems in your neck, face, shoulders, or arms. Other people have problems in the hips, legs, and feet. Sometimes, the pain is in your chest and back. Where you feel these signs on your body will depend on what nerve is hurt.  Radiculopathy may be caused by many things. The spinal canal may become smaller or more narrow which is spinal stenosis. Problems with the discs in your back can also cause pressure on your nerves as they leave your spine.       What care is needed at home?   · Ask your doctor what you need to do when you go home. Make sure you ask questions if you do not understand what the doctor says. This way you will know what you need to do.  · Practice good ways to lift. Always keep your back straight and bend your knees when picking up an object.  · Do not lift heavy objects that make you strain your arms, legs, or back to lift them up.  · You can use a cane, walker, or crutches to help you get around and reduce your chances of falling or getting hurt.  · Avoid sitting or standing for a long period of time.  · Do not drive unless your doctor says it is OK.  · Always sit and stand up straight.  · Sleep on a mattress that gives you good support.  · Keep a healthy weight.  · Get lots of rest.  What follow-up care is needed?   · Your doctor may ask you to make visits to the office to check on your progress. Be sure to keep these visits. Together you can make a plan for more care.  · Your doctor may have you see a chiropractor or massage therapist.  · You may also need to see a physical therapist (PT). The PT will teach you  exercises to help you improve movement and reduce pain.  What drugs may be needed?   The doctor may order drugs to:  · Help with pain  · Relax the muscles  Will physical activity be limited?   You may have to limit your activity. Talk to your doctor about the right amount of activity for you.  When do I need to call the doctor?   · Back pain after a fall or something hits against your spine  · Redness or swelling on the back or spine  · Pain that travels down to your legs below the knee  · Weakness or numbness in your buttocks, thigh, leg, or pelvis  · Weakness or numbness in your shoulders, arms, neck, or face  · Not able to walk or pain that increases when you walk or move affected body parts  · Pain that awakens you at night or gets worse when you lie down  · Pain will not go away after treatment  · Loss of control of bowel movements or bladder function  Teach Back: Helping You Understand   The Teach Back Method helps you understand the information we are giving you. After you talk with the staff, tell them in your own words what you learned. This helps to make sure the staff has described each thing clearly. It also helps to explain things that may have been confusing. Before going home, make sure you can do these:  · I can tell you about my condition.  · I can tell you what may help ease my pain.  · I can tell you what I will do if I have more pain; weakness or numbness in my buttocks, thigh, leg, or pelvis; or more pain when I lie down.  Where can I learn more?   [employer]  https://orthoinfo.aaos.org/en/diseases--conditions/cervical-radiculopathy-pinched-nerve   Last Reviewed Date   2021-09-28  Consumer Information Use and Disclaimer   This information is not specific medical advice and does not replace information you receive from your health care provider. This is only a brief summary of general information. It does NOT include all information about conditions, illnesses,  injuries, tests, procedures, treatments, therapies, discharge instructions or life-style choices that may apply to you. You must talk with your health care provider for complete information about your health and treatment options. This information should not be used to decide whether or not to accept your health care providers advice, instructions or recommendations. Only your health care provider has the knowledge and training to provide advice that is right for you.  Copyright   Copyright © 2021 [employer] and its affiliates and/or licensors. All rights reserved.

## 2024-10-23 DIAGNOSIS — I10 PRIMARY HYPERTENSION: ICD-10-CM

## 2024-10-23 RX ORDER — LOSARTAN POTASSIUM 25 MG/1
25 TABLET ORAL
Qty: 90 TABLET | Refills: 1 | Status: SHIPPED | OUTPATIENT
Start: 2024-10-23

## 2024-11-05 ENCOUNTER — OFFICE VISIT (OUTPATIENT)
Dept: FAMILY MEDICINE | Facility: CLINIC | Age: 69
End: 2024-11-05
Payer: COMMERCIAL

## 2024-11-05 ENCOUNTER — HOSPITAL ENCOUNTER (OUTPATIENT)
Dept: RADIOLOGY | Facility: HOSPITAL | Age: 69
Discharge: HOME | End: 2024-11-05
Payer: COMMERCIAL

## 2024-11-05 ENCOUNTER — OFFICE VISIT (OUTPATIENT)
Dept: GASTROENTEROLOGY | Facility: CLINIC | Age: 69
End: 2024-11-05
Payer: COMMERCIAL

## 2024-11-05 VITALS
HEIGHT: 64 IN | OXYGEN SATURATION: 96 % | DIASTOLIC BLOOD PRESSURE: 62 MMHG | HEART RATE: 77 BPM | BODY MASS INDEX: 26.61 KG/M2 | SYSTOLIC BLOOD PRESSURE: 93 MMHG

## 2024-11-05 VITALS
TEMPERATURE: 98 F | SYSTOLIC BLOOD PRESSURE: 113 MMHG | OXYGEN SATURATION: 99 % | DIASTOLIC BLOOD PRESSURE: 80 MMHG | HEART RATE: 72 BPM | WEIGHT: 150 LBS | BODY MASS INDEX: 25.75 KG/M2

## 2024-11-05 DIAGNOSIS — R63.0 DECREASED APPETITE: ICD-10-CM

## 2024-11-05 DIAGNOSIS — R63.4 WEIGHT LOSS: ICD-10-CM

## 2024-11-05 DIAGNOSIS — K59.00 CONSTIPATION, UNSPECIFIED CONSTIPATION TYPE: ICD-10-CM

## 2024-11-05 DIAGNOSIS — R10.84 GENERALIZED ABDOMINAL PAIN: Primary | Chronic | ICD-10-CM

## 2024-11-05 DIAGNOSIS — R10.30 LOWER ABDOMINAL PAIN: Primary | ICD-10-CM

## 2024-11-05 PROCEDURE — 3079F DIAST BP 80-89 MM HG: CPT | Mod: ,,, | Performed by: NURSE PRACTITIONER

## 2024-11-05 PROCEDURE — 74018 RADEX ABDOMEN 1 VIEW: CPT | Mod: TC

## 2024-11-05 PROCEDURE — 74018 RADEX ABDOMEN 1 VIEW: CPT | Mod: 26,,, | Performed by: RADIOLOGY

## 2024-11-05 PROCEDURE — 3074F SYST BP LT 130 MM HG: CPT | Mod: CPTII,,,

## 2024-11-05 PROCEDURE — 3008F BODY MASS INDEX DOCD: CPT | Mod: CPTII,,,

## 2024-11-05 PROCEDURE — 3074F SYST BP LT 130 MM HG: CPT | Mod: ,,, | Performed by: NURSE PRACTITIONER

## 2024-11-05 PROCEDURE — 1159F MED LIST DOCD IN RCRD: CPT | Mod: CPTII,,,

## 2024-11-05 PROCEDURE — 99999 PR PBB SHADOW E&M-EST. PATIENT-LVL V: CPT | Mod: PBBFAC,,,

## 2024-11-05 PROCEDURE — 99214 OFFICE O/P EST MOD 30 MIN: CPT | Mod: S$PBB,,,

## 2024-11-05 PROCEDURE — 1160F RVW MEDS BY RX/DR IN RCRD: CPT | Mod: ,,, | Performed by: NURSE PRACTITIONER

## 2024-11-05 PROCEDURE — 3008F BODY MASS INDEX DOCD: CPT | Mod: ,,, | Performed by: NURSE PRACTITIONER

## 2024-11-05 PROCEDURE — 1159F MED LIST DOCD IN RCRD: CPT | Mod: ,,, | Performed by: NURSE PRACTITIONER

## 2024-11-05 PROCEDURE — 4010F ACE/ARB THERAPY RXD/TAKEN: CPT | Mod: CPTII,,,

## 2024-11-05 PROCEDURE — 99215 OFFICE O/P EST HI 40 MIN: CPT | Mod: PBBFAC

## 2024-11-05 PROCEDURE — 99213 OFFICE O/P EST LOW 20 MIN: CPT | Mod: ,,, | Performed by: NURSE PRACTITIONER

## 2024-11-05 PROCEDURE — 3078F DIAST BP <80 MM HG: CPT | Mod: CPTII,,,

## 2024-11-05 PROCEDURE — 4010F ACE/ARB THERAPY RXD/TAKEN: CPT | Mod: ,,, | Performed by: NURSE PRACTITIONER

## 2024-11-05 NOTE — PROGRESS NOTES
Gastroenterology Clinic Note    Patient ID: [id number]   Referring MD: Azra Jones FNP   Chief Complaint:   Chief Complaint   Patient presents with    Constipation    Nausea    Emesis       History of Present Illness   Xavier Rodriguez is an 68 y.o. AAF who is referred for constipation.  Patient is accompanied to clinic by her daughter today who reports that the patient has been complaining of increased abdominal pain, nausea, and vomiting.  Daughter reports that earlier today, the patient was offered some water and after drinking this felt like she was going to throw it up.  She has chronic issues with constipation.  She has been prescribed Linzess 145 mcg daily but is not currently taking this medication because nothing seems to help.  Denies hematochezia or melena.  Numerous colonoscopies due to inadequate prep.  Most recently she underwent colonoscopy with Dr. Guzmán 03/2024 that revealed hemorrhoids and multiple ulcers in the rectum with biopsies showing mild inflammation that is benign.  No prior EGD reported.  Her daughter reports no surgical history of cholecystectomy.  Daughter reports an unintentional weight loss of 15 lb over the past several weeks.    Last colonoscopy was 03/18/2024 with 10 year recall for screening purposes.    Review of Systems   Constitutional:  Positive for weight loss.   Gastrointestinal:  Positive for abdominal pain, constipation, diarrhea, heartburn, nausea and vomiting. Negative for blood in stool and melena.       Past Medical History      Past Medical History:   Diagnosis Date    Chronic low back pain     Depression     Dysfunctional voiding of urine 11/10/2021    Patient states she does not urinate.  She was taken off of lasix which was the only thing that helped her empty. PVR 0 ml Chronic constipation, lifelong.  Has been out of Linzess for a month now. 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    INJECTION OF ANESTHETIC AGENT AROUND MEDIAL BRANCH NERVES INNERVATING LUMBAR FACET JOINT Bilateral 2023    Procedure: Bilateral L4-5,5-S1 MBB;  Surgeon: Ilsa Mendez MD;  Location: [facility];  Service: Pain Management;  Laterality: Bilateral;    TUMOR REMOVAL         Allergies   Review of patient's allergies indicates:  No Known Allergies    Immunization History     Immunization History   Administered Date(s) Administered    Influenza (FLUAD) - Quadrivalent - Adjuvanted - PF *Preferred* (65+) 2023       Past Family History      Family History   Problem Relation Name Age of Onset    Heart disease Mother Telma Rodriguez     Heart attack Mother Telma Rodriguez     Heart disease Father Alfonso Rodriguez         Heart attack    Heart attack Father Alfonso Rodriguez        Past Social History      Social History     Socioeconomic History    Marital status: Single   Tobacco Use    Smoking status: Former     Current packs/day: 0.00     Average packs/day: 0.5 packs/day for 20.0 years (10.0 ttl pk-yrs)     Types: Cigarettes     Start date:      Quit date:      Years since quittin.8     Passive exposure: Past    Smokeless tobacco: Never    Tobacco comments:     Stop smoking about 15 years ago   Substance and Sexual Activity    Alcohol use: Never    Drug use: Yes     Types: Hydrocodone    Sexual activity: Not Currently     Partners: Male     Birth control/protection: None     Social Drivers of Health     Financial Resource Strain: Low Risk  (2024)    Overall Financial Resource Strain (CARDIA)     Difficulty of Paying Living Expenses: Not very hard   Food Insecurity: No Food Insecurity (2024)    Hunger Vital Sign     Worried About Running Out of Food in the Last Year: Never true     Ran Out of Food in the Last Year: Never true   Transportation  "Needs: No Transportation Needs (7/2/2024)    PRAPARE - Transportation     Lack of Transportation (Medical): No     Lack of Transportation (Non-Medical): No   Physical Activity: Inactive (7/2/2024)    Exercise Vital Sign     Days of Exercise per Week: 0 days     Minutes of Exercise per Session: 0 min   Stress: No Stress Concern Present (7/2/2024)    [employer] - Occupational Stress Questionnaire     Feeling of Stress : Only a little   Housing Stability: Low Risk  (7/2/2024)    Housing Stability Vital Sign     Unable to Pay for Housing in the Last Year: No     Homeless in the Last Year: No       Current Medications     Outpatient Medications Marked as Taking for the 11/5/24 encounter (Office Visit) with Maggi Young FNP   Medication Sig Dispense Refill    ergocalciferol (ERGOCALCIFEROL) 50,000 unit Cap Take 1 capsule (50,000 Units total) by mouth every 30 days. 3 capsule 3    furosemide (LASIX) 20 MG tablet TAKE 1 TABLET BY MOUTH EVERY DAY AS NEEDED 30 tablet 0    linaCLOtide (LINZESS) 145 mcg Cap capsule Take 1 capsule by mouth.      losartan (COZAAR) 25 MG tablet TAKE 1 TABLET BY MOUTH EVERY DAY 90 tablet 1    lovastatin (MEVACOR) 20 MG tablet TAKE 1 TABLET BY MOUTH EVERY DAY IN THE EVENING 90 tablet 1    naproxen (NAPROSYN) 500 MG tablet Take 1 tablet (500 mg total) by mouth daily as needed. 30 tablet 0    potassium chloride SA (K-DUR,KLOR-CON) 20 MEQ tablet Take 1 tablet (20 mEq total) by mouth once daily. 90 tablet 1    tamsulosin (FLOMAX) 0.4 mg Cap Take 1 capsule (0.4 mg total) by mouth once daily. 90 capsule 1        I have reviewed the current medications, allergies, vital signs, past medical and surgical history, family medical history, and social history for this encounter and agree with all findings.    OBJECTIVE    Physical Exam    BP 93/62   Pulse 77   Ht 5' 4" (1.626 m)   SpO2 96%   BMI 26.61 kg/m²   GEN: Well appearing, cooperative, NAD  NECK: Supple, no LAD  CV: " Normal rate  RESP: Unlabored  ABD: ND, NT, soft, no guarding  EXT: No clubbing, cyanosis, or edema  SKIN: Warm and dry  NEURO: At baseline.  Alert.    LABS    CBC (with or without Differential):   Lab Results   Component Value Date    WBC 5.08 10/10/2024    HGB 12.2 10/10/2024    HCT 39.0 10/10/2024    MCV 85.7 10/10/2024    MCH 26.8 (L) 10/10/2024    MCHC 31.3 (L) 10/10/2024    RDW 14.6 (H) 10/10/2024     10/10/2024    MPV 11.1 10/10/2024    NEUTOPHILPCT 61.6 10/10/2024    DIFFTYPE Auto 10/10/2024     BMP/CMP:   Lab Results   Component Value Date     10/10/2024    K 3.4 (L) 10/10/2024     10/10/2024    CO2 27 10/10/2024    BUN 9 10/10/2024    CREATININE 0.65 10/10/2024    GLU 96 10/10/2024    CALCIUM 9.3 10/10/2024    ALBUMIN 3.5 10/10/2024    AST 11 (L) 10/10/2024    ALT 6 (L) 10/10/2024    ALKPHOS 89 10/10/2024        IMAGING  No pertinent imaging available.    ASSESSMENT  Xavier Rodriguez is a 68 y.o. AAF with history of hypertension, hyperlipidemia, and IBS who is referred for constipation.    1. Generalized abdominal pain    2. Constipation, unspecified constipation type    3. Weight loss           PLAN    -  x-ray KUB to assess stool burden; I suspect that the patient will likely benefit from bowel cleanse followed by Linzess 290 mcg daily for IBS C  - schedule abdominal ultrasound for chronic upper abdominal pain; rule out cholelithiasis  - schedule EGD for weight loss upper abdominal pain, nausea, and vomiting; rule out PUD versus malignancy  - CT of the abdomen and pelvis for weight loss if EGD is unremarkable  - return to clinic for follow-up in 4 months with TEENA Robertson    There are no Patient Instructions on file for this visit.      Orders Placed This Encounter   Procedures    US Abdomen Complete     Standing Status:   Future     Standing Expiration Date:   11/5/2025     Order Specific Question:   May the Radiologist modify the order per protocol to meet the clinical needs of the  patient?     Answer:   Yes     Order Specific Question:   Release to patient     Answer:   Immediate    X-Ray KUB     Standing Status:   Future     Number of Occurrences:   1     Standing Expiration Date:   11/5/2025     Order Specific Question:   May the Radiologist modify the order per protocol to meet the clinical needs of the patient?     Answer:   Yes     Order Specific Question:   Release to patient     Answer:   Immediate         The risks and benefits of my recommendations, as well as other treatment options were discussed with the patient and daughter today. All questions were answered.    35 minutes of total time spent on the encounter, which includes face to face time and non-face to face time preparing to see the patient (eg, review of tests), obtaining and/or reviewing separately obtained history, documenting clinical information in the electronic or other health record, Independently interpreting results (not separately reported) and communicating results to the patient/family/caregiver, or care coordination (not separately reported).        Maggi Young, FNP/ACNP  [facility]

## 2024-11-05 NOTE — PROGRESS NOTES
Clinic Note    Xavier Rodriguez is a 68 y.o. female     Chief Complaint:   Chief Complaint   Patient presents with    Abdominal Pain     Room 5// Patient was brought in today by her daughter for abdominal pain, patient has not been eating or drinking much in the last few days.         Subjective:    Patient comes in today with daughter. Daughter states patient is complaining of lower abdominal pain, weight loss, and decrease appetite. Daughter states symptoms started severe about 2 months ago and progressively worsening. States patient will not eat because states food does not taste right. Has tried ensure, boost, etc. Has been using gas-x prn. Daughter states urine is dark orange. States she does cough up white mucous at times. Denies fever.   Has HH.         Allergies:   Review of patient's allergies indicates:  No Known Allergies     Past Medical History:  Past Medical History:   Diagnosis Date    Chronic low back pain     Depression     Dysfunctional voiding of urine 11/10/2021    Patient states she does not urinate.  She was taken off of lasix which was the only thing that helped her empty. PVR 0 ml Chronic constipation, lifelong.  Has been out of Linzess for a month now. Renal function WNL when last checked in May    Edema     Hyperlipidemia     Hypertension     IBS (irritable bowel syndrome)         Current Medications:    Current Outpatient Medications:     ergocalciferol (ERGOCALCIFEROL) 50,000 unit Cap, Take 1 capsule (50,000 Units total) by mouth every 30 days., Disp: 3 capsule, Rfl: 3    furosemide (LASIX) 20 MG tablet, TAKE 1 TABLET BY MOUTH EVERY DAY AS NEEDED, Disp: 30 tablet, Rfl: 0    [START ON 11/27/2024] HYDROcodone-acetaminophen (NORCO) 7.5-325 mg per tablet, Take 1 tablet by mouth once daily. (Patient not taking: Reported on 11/5/2024), Disp: 30 tablet, Rfl: 0    HYDROcodone-acetaminophen (NORCO) 7.5-325 mg per tablet, Take 1 tablet by mouth once daily. (Patient not taking: Reported on 11/5/2024),  Disp: 30 tablet, Rfl: 0    HYDROcodone-acetaminophen (NORCO) 7.5-325 mg per tablet, Take 1 tablet by mouth once daily. (Patient not taking: Reported on 11/5/2024), Disp: 30 tablet, Rfl: 0    linaCLOtide (LINZESS) 145 mcg Cap capsule, Take 1 capsule by mouth., Disp: , Rfl:     losartan (COZAAR) 25 MG tablet, TAKE 1 TABLET BY MOUTH EVERY DAY, Disp: 90 tablet, Rfl: 1    lovastatin (MEVACOR) 20 MG tablet, TAKE 1 TABLET BY MOUTH EVERY DAY IN THE EVENING, Disp: 90 tablet, Rfl: 1    naproxen (NAPROSYN) 500 MG tablet, Take 1 tablet (500 mg total) by mouth daily as needed., Disp: 30 tablet, Rfl: 0    potassium chloride SA (K-DUR,KLOR-CON) 20 MEQ tablet, Take 1 tablet (20 mEq total) by mouth once daily., Disp: 90 tablet, Rfl: 1    tamsulosin (FLOMAX) 0.4 mg Cap, Take 1 capsule (0.4 mg total) by mouth once daily., Disp: 90 capsule, Rfl: 1       Review of Systems   Constitutional:  Positive for appetite change. Negative for fever.   Respiratory:  Negative for cough and shortness of breath.    Cardiovascular:  Negative for chest pain, palpitations and leg swelling.   Gastrointestinal:  Positive for abdominal pain and constipation. Negative for diarrhea, nausea and vomiting.   Genitourinary:  Negative for dysuria.          Objective:    /80 (BP Location: Right arm, Patient Position: Sitting)   Pulse 72   Temp 98.2 °F (36.8 °C) (Oral)   Wt 68 kg (150 lb)   SpO2 99%   BMI 25.75 kg/m²      Physical Exam  Eyes:      Extraocular Movements: Extraocular movements intact.   Cardiovascular:      Rate and Rhythm: Normal rate and regular rhythm.      Pulses: Normal pulses.      Heart sounds: Normal heart sounds.   Pulmonary:      Effort: Pulmonary effort is normal.      Breath sounds: Normal breath sounds.   Abdominal:      Palpations: Abdomen is soft.      Tenderness: There is no abdominal tenderness.   Musculoskeletal:      Right lower leg: No edema.      Left lower leg: No edema.      Comments: Sitting wheelchair      Neurological:      Mental Status: She is alert.      Gait: Gait abnormal.          Assessment and Plan:    1. Lower abdominal pain    2. Weight loss    3. Decreased appetite         Lower abdominal pain  -     POCT URINALYSIS W/O SCOPE  -     Urinalysis; Future; Expected date: 11/05/2024  -     Urine Culture High Risk; Future; Expected date: 11/05/2024  -attempted to collect urine in clinic but spilled. Gave patient urine cup and will return  -kub ordered today per GI pending  -recent labs 3 weeks ago    Weight loss  -f/u with GI as scheduled. Plan in progress  -encouraged ensure, Pedialyte, etc    Decreased appetite  -encouraged supplements        There are no Patient Instructions on file for this visit.   Follow up if symptoms worsen or fail to improve.

## 2024-11-07 ENCOUNTER — HOSPITAL ENCOUNTER (EMERGENCY)
Facility: HOSPITAL | Age: 69
Discharge: HOME | End: 2024-11-07
Payer: COMMERCIAL

## 2024-11-07 VITALS
OXYGEN SATURATION: 98 % | BODY MASS INDEX: 24.99 KG/M2 | HEART RATE: 58 BPM | HEIGHT: 65 IN | DIASTOLIC BLOOD PRESSURE: 76 MMHG | WEIGHT: 150 LBS | TEMPERATURE: 98 F | RESPIRATION RATE: 16 BRPM | SYSTOLIC BLOOD PRESSURE: 122 MMHG

## 2024-11-07 DIAGNOSIS — E87.6 HYPOKALEMIA: ICD-10-CM

## 2024-11-07 DIAGNOSIS — E86.0 DEHYDRATION: Primary | ICD-10-CM

## 2024-11-07 LAB
ALBUMIN SERPL BCP-MCNC: 3.8 G/DL (ref 3.5–5)
ALBUMIN/GLOB SERPL: 0.9 {RATIO}
ALP SERPL-CCNC: 81 U/L (ref 55–142)
ALT SERPL W P-5'-P-CCNC: 22 U/L (ref 13–56)
ANION GAP SERPL CALCULATED.3IONS-SCNC: 18 MMOL/L (ref 7–16)
ANISOCYTOSIS BLD QL SMEAR: ABNORMAL
AST SERPL W P-5'-P-CCNC: 27 U/L (ref 15–37)
BACTERIA #/AREA URNS HPF: ABNORMAL /HPF
BASOPHILS # BLD AUTO: 0.07 K/UL (ref 0–0.2)
BASOPHILS NFR BLD AUTO: 1 % (ref 0–1)
BILIRUB SERPL-MCNC: 2.9 MG/DL (ref ?–1.2)
BILIRUB UR QL STRIP: ABNORMAL
BUN SERPL-MCNC: 21 MG/DL (ref 7–18)
BUN/CREAT SERPL: 25 (ref 6–20)
CALCIUM SERPL-MCNC: 9.6 MG/DL (ref 8.5–10.1)
CHLORIDE SERPL-SCNC: 92 MMOL/L (ref 98–107)
CLARITY UR: ABNORMAL
CO2 SERPL-SCNC: 29 MMOL/L (ref 21–32)
COLOR UR: ABNORMAL
CREAT SERPL-MCNC: 0.83 MG/DL (ref 0.55–1.02)
DIFFERENTIAL METHOD BLD: ABNORMAL
EGFR (NO RACE VARIABLE) (RUSH/TITUS): 76 ML/MIN/1.73M2
EOSINOPHIL # BLD AUTO: 0 K/UL (ref 0–0.5)
EOSINOPHIL NFR BLD AUTO: 0 % (ref 1–4)
ERYTHROCYTE [DISTWIDTH] IN BLOOD BY AUTOMATED COUNT: 16.7 % (ref 11.5–14.5)
GLOBULIN SER-MCNC: 4.1 G/DL (ref 2–4)
GLUCOSE SERPL-MCNC: 124 MG/DL (ref 74–106)
GLUCOSE UR STRIP-MCNC: NEGATIVE MG/DL
HCT VFR BLD AUTO: 42.4 % (ref 38–47)
HGB BLD-MCNC: 14.1 G/DL (ref 12–16)
HYALINE CASTS #/AREA URNS LPF: ABNORMAL /LPF
KETONES UR STRIP-SCNC: 40 MG/DL
LEUKOCYTE ESTERASE UR QL STRIP: NEGATIVE
LYMPHOCYTES # BLD AUTO: 1.78 K/UL (ref 1–4.8)
LYMPHOCYTES NFR BLD AUTO: 26.7 % (ref 27–41)
LYMPHOCYTES NFR BLD MANUAL: 34 % (ref 27–41)
MCH RBC QN AUTO: 27.7 PG (ref 27–31)
MCHC RBC AUTO-ENTMCNC: 33.3 G/DL (ref 32–36)
MCV RBC AUTO: 83.3 FL (ref 80–96)
METAMYELOCYTES NFR BLD MANUAL: 1 %
MONOCYTES # BLD AUTO: 0.64 K/UL (ref 0–0.8)
MONOCYTES NFR BLD AUTO: 9.6 % (ref 2–6)
MONOCYTES NFR BLD MANUAL: 4 % (ref 2–6)
MPC BLD CALC-MCNC: 12.1 FL (ref 9.4–12.4)
MUCOUS THREADS #/AREA URNS HPF: ABNORMAL /HPF
NEUTROPHILS # BLD AUTO: 4.18 K/UL (ref 1.8–7.7)
NEUTROPHILS NFR BLD AUTO: 62.7 % (ref 53–65)
NEUTS BAND NFR BLD MANUAL: 1 % (ref 1–5)
NEUTS SEG NFR BLD MANUAL: 60 % (ref 50–62)
NITRITE UR QL STRIP: NEGATIVE
NRBC BLD MANUAL-RTO: 1 /100 WBC
PH UR STRIP: 5.5 PH UNITS
PLATELET # BLD AUTO: 226 K/UL (ref 150–400)
PLATELET MORPHOLOGY: NORMAL
POTASSIUM SERPL-SCNC: 2.7 MMOL/L (ref 3.5–5.1)
PROT SERPL-MCNC: 7.9 G/DL (ref 6.4–8.2)
PROT UR QL STRIP: 100
RBC # BLD AUTO: 5.09 M/UL (ref 4.2–5.4)
RBC # UR STRIP: ABNORMAL /UL
RBC #/AREA URNS HPF: ABNORMAL /HPF
SODIUM SERPL-SCNC: 136 MMOL/L (ref 136–145)
SP GR UR STRIP: 1.02
SQUAMOUS #/AREA URNS LPF: ABNORMAL /LPF
UROBILINOGEN UR STRIP-ACNC: 4 MG/DL
WBC # BLD AUTO: 6.67 K/UL (ref 4.5–11)
WBC #/AREA URNS HPF: ABNORMAL /HPF

## 2024-11-07 PROCEDURE — 63600175 PHARM REV CODE 636 W HCPCS: Performed by: PHYSICIAN ASSISTANT

## 2024-11-07 PROCEDURE — 99285 EMERGENCY DEPT VISIT HI MDM: CPT | Mod: GF | Performed by: PHYSICIAN ASSISTANT

## 2024-11-07 PROCEDURE — 99284 EMERGENCY DEPT VISIT MOD MDM: CPT | Mod: 25

## 2024-11-07 PROCEDURE — 81003 URINALYSIS AUTO W/O SCOPE: CPT | Performed by: PHYSICIAN ASSISTANT

## 2024-11-07 PROCEDURE — 85025 COMPLETE CBC W/AUTO DIFF WBC: CPT | Performed by: PHYSICIAN ASSISTANT

## 2024-11-07 PROCEDURE — 96365 THER/PROPH/DIAG IV INF INIT: CPT

## 2024-11-07 PROCEDURE — 80053 COMPREHEN METABOLIC PANEL: CPT | Performed by: PHYSICIAN ASSISTANT

## 2024-11-07 PROCEDURE — 36415 COLL VENOUS BLD VENIPUNCTURE: CPT | Performed by: PHYSICIAN ASSISTANT

## 2024-11-07 PROCEDURE — 25000003 PHARM REV CODE 250: Performed by: PHYSICIAN ASSISTANT

## 2024-11-07 RX ORDER — SODIUM CHLORIDE 9 MG/ML
500 INJECTION, SOLUTION INTRAVENOUS
Status: COMPLETED | OUTPATIENT
Start: 2024-11-07 | End: 2024-11-07

## 2024-11-07 RX ORDER — POTASSIUM CHLORIDE 7.45 MG/ML
10 INJECTION INTRAVENOUS ONCE
Status: COMPLETED | OUTPATIENT
Start: 2024-11-07 | End: 2024-11-07

## 2024-11-07 RX ADMIN — POTASSIUM CHLORIDE 10 MEQ: 7.46 INJECTION, SOLUTION INTRAVENOUS at 06:11

## 2024-11-07 RX ADMIN — POTASSIUM BICARBONATE 20 MEQ: 391 TABLET, EFFERVESCENT ORAL at 06:11

## 2024-11-07 RX ADMIN — SODIUM CHLORIDE 500 ML: 9 INJECTION, SOLUTION INTRAVENOUS at 06:11

## 2024-11-07 NOTE — ED TRIAGE NOTES
Pt presents with decreased appetite and nausea x 2 weeks. Denies abd pain. Pt's daughter states urine is bright orange in coloration. Denies burning with urination or urinary frequency. Had visit with PCP yesterday but was unable to obtain UA.

## 2024-11-08 NOTE — ED PROVIDER NOTES
Encounter Date: 11/7/2024       History     Chief Complaint   Patient presents with    Urinary Complaint    Nausea     Patient is a 68-year-old female with history of weakness, daughters concerned that she may have a UTI.    She has not been wanting to eat and drink.    She has an appointment next week for EGD.    She has a past medical history of IBS, chronic low back pain, hypertension, depression, dementia.    Past surgical history is positive for tumor removal, brain surgery, lumbar injections.    Patient is a former smoker, denies any alcohol, does take hydrocodone      Review of patient's allergies indicates:  No Known Allergies  Past Medical History:   Diagnosis Date    Chronic low back pain     Depression     Dysfunctional voiding of urine 11/10/2021    Patient states she does not urinate.  She was taken off of lasix which was the only thing that helped her empty. PVR 0 ml Chronic constipation, lifelong.  Has been out of Linzess for a month now. 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    INJECTION OF ANESTHETIC AGENT AROUND MEDIAL BRANCH NERVES INNERVATING LUMBAR FACET JOINT Bilateral 4/13/2023    Procedure: Bilateral L4-5,5-S1 MBB;  Surgeon: Ilsa Mendez MD;  Location: [facility];  Service: Pain Management;  Laterality: Bilateral;    TUMOR REMOVAL       Family History   Problem Relation Name Age of Onset    Heart disease Mother Telma Rodriguez     Heart attack Mother Telma Rodriguez     Heart disease Father Alfonso Rodriguez         Heart attack    Heart attack Father Alfonso Rodriguez      Social History     Tobacco Use    Smoking status: Former     Current packs/day: 0.00     Average packs/day: 0.5 packs/day for 20.0 years (10.0 ttl pk-yrs)     Types: Cigarettes     Start date: 1972     Quit date: 1992      Years since quittin.8     Passive exposure: Past    Smokeless tobacco: Never    Tobacco comments:     Stop smoking about 15 years ago   Substance Use Topics    Alcohol use: Never    Drug use: Yes     Types: Hydrocodone     Review of Systems   Constitutional:  Positive for activity change and appetite change.   Neurological:  Positive for weakness.   All other systems reviewed and are negative.      Physical Exam     Initial Vitals [24 1658]   BP Pulse Resp Temp SpO2   95/68 83 15 98.3 °F (36.8 °C) 99 %      MAP       --         Physical Exam    Nursing note and vitals reviewed.  Constitutional: She appears well-developed and well-nourished. No distress.   HENT:   Head: Atraumatic.   Eyes: EOM are normal.   Neck: Neck supple.   Cardiovascular:  Normal rate and regular rhythm.           Pulmonary/Chest: Breath sounds normal.   Musculoskeletal:      Cervical back: Neck supple.     Neurological: She is alert.   Pleasantly confused per her daughter   Skin: Skin is dry.   Psychiatric: She has a normal mood and affect.         Medical Screening Exam   See Full Note    ED Course   Procedures  Labs Reviewed   URINALYSIS - Abnormal       Result Value    Color, UA Dark Yellow      Clarity, UA Slightly Cloudy      pH, UA 5.5      Leukocytes, UA Negative      Nitrites, UA Negative      Protein,  (*)     Glucose, UA Negative      Ketones, UA 40 (*)     Urobilinogen, UA 4.0 (*)     Bilirubin, UA Large (*)     Blood, UA Trace-Intact (*)     Specific Gravity, UA 1.025     COMPREHENSIVE METABOLIC PANEL - Abnormal    Sodium 136      Potassium 2.7 (*)     Chloride 92 (*)     CO2 29      Anion Gap 18 (*)     Glucose 124 (*)     BUN 21 (*)     Creatinine 0.83      BUN/Creatinine Ratio 25 (*)     Calcium 9.6      Total Protein 7.9      Albumin 3.8      Globulin 4.1 (*)     A/G Ratio 0.9      Bilirubin, Total 2.9 (*)     Alk Phos 81      ALT 22      AST 27      eGFR 76     CBC WITH DIFFERENTIAL - Abnormal    WBC 6.67       RBC 5.09      Hemoglobin 14.1      Hematocrit 42.4      MCV 83.3      MCH 27.7      MCHC 33.3      RDW 16.7 (*)     Platelet Count 226      MPV 12.1      Neutrophils % 62.7      Lymphocytes % 26.7 (*)     Neutrophils, Abs 4.18      Lymphocytes, Absolute 1.78      Diff Type Manual      Monocytes % 9.6 (*)     Eosinophils % 0.0 (*)     Basophils % 1.0      Monocytes, Absolute 0.64      Eosinophils, Absolute 0.00      Basophils, Absolute 0.07     MANUAL DIFFERENTIAL - Abnormal    Segmented Neutrophils, Man % 60      Bands, Man % 1      Lymphocytes, Man % 34      Monocytes, Man % 4      Metamyelocytes, Man % 1      nRBC, Manual 1 (*)     Platelet Morphology Normal      Anisocytosis 1+     URINALYSIS, MICROSCOPIC - Abnormal    WBC, UA 0-5      RBC, UA 3-5 (*)     Bacteria, UA Few (*)     Squamous Epithelial Cells, UA Few (*)     Mucus, UA Few (*)     Hyaline Casts, UA 2-5 (*)    CBC W/ AUTO DIFFERENTIAL    Narrative:     The following orders were created for panel order CBC auto differential.  Procedure                               Abnormality         Status                     ---------                               -----------         ------                     CBC with Differential[[id number]]       Abnormal            Final result               Manual Differential[[id number]]         Abnormal            Final result                 Please view results for these tests on the individual orders.          Imaging Results    None          Medications   potassium bicarbonate disintegrating tablet 20 mEq (20 mEq Oral Given 11/7/24 1836)   potassium chloride 10 mEq in 100 mL IVPB (10 mEq Intravenous New Bag 11/7/24 1847)   0.9%  NaCl infusion (500 mLs Intravenous New Bag 11/7/24 1844)     Medical Decision Making  Patient is a 68-year-old female with history of weakness, daughters concerned that she may have a UTI.    She has not been wanting to eat and drink.    She has an appointment next week for EGD.    She has a past  medical history of IBS, chronic low back pain, hypertension, depression, dementia.    Past surgical history is positive for tumor removal, brain surgery, lumbar injections.    Patient is a former smoker, denies any alcohol, does take hydrocodone    Patient's workup includes CBC, CMP, IV, with UA.    Patient does not have a urinary tract infection, however she is dehydrated, she will get 500 mL of normal saline, also her potassium is low she will get Effer-K, and a rider.    She will follow-up with her primary care provider, and keep appointment for EGD    Amount and/or Complexity of Data Reviewed  Labs: ordered.    Risk  Prescription drug management.                                      Clinical Impression:   Final diagnoses:  [E86.0] Dehydration (Primary)  [E87.6] Hypokalemia        ED Disposition Condition    Discharge Stable          ED Prescriptions    None       Follow-up Information    None          Mg Cheng PA  11/07/24 0751

## 2024-11-09 DIAGNOSIS — G89.4 CHRONIC PAIN SYNDROME: ICD-10-CM

## 2024-11-11 ENCOUNTER — TELEPHONE (OUTPATIENT)
Dept: EMERGENCY MEDICINE | Facility: HOSPITAL | Age: 69
End: 2024-11-11
Payer: COMMERCIAL

## 2024-11-11 RX ORDER — NAPROXEN 500 MG/1
500 TABLET ORAL DAILY PRN
Qty: 30 TABLET | Refills: 0 | Status: SHIPPED | OUTPATIENT
Start: 2024-11-11

## 2024-11-12 ENCOUNTER — TELEPHONE (OUTPATIENT)
Dept: GASTROENTEROLOGY | Facility: CLINIC | Age: 69
End: 2024-11-12
Payer: COMMERCIAL

## 2024-11-12 NOTE — TELEPHONE ENCOUNTER
Patient's daughter aware of results and recommendations, verbalized good understanding. Informed her to keep US Abdomen as scheduled for 11/18/24 @ 8:30am.

## 2024-11-12 NOTE — TELEPHONE ENCOUNTER
----- Message from ETENA Ascencio sent at 11/12/2024  9:25 AM CST -----  Her xray is showing probable ingested material in her colon but otherwise normal. Does she recall swallowing anything abnormal? She could try a bowel cleanse to see if this helps her bowels move. Continue her linzess when cleanse is complete.

## 2024-11-18 ENCOUNTER — TELEPHONE (OUTPATIENT)
Dept: GASTROENTEROLOGY | Facility: CLINIC | Age: 69
End: 2024-11-18
Payer: COMMERCIAL

## 2024-11-18 ENCOUNTER — HOSPITAL ENCOUNTER (OUTPATIENT)
Dept: RADIOLOGY | Facility: HOSPITAL | Age: 69
Discharge: HOME | End: 2024-11-18
Payer: COMMERCIAL

## 2024-11-18 DIAGNOSIS — K80.20 CALCULUS OF GALLBLADDER WITHOUT CHOLECYSTITIS WITHOUT OBSTRUCTION: Primary | ICD-10-CM

## 2024-11-18 DIAGNOSIS — R10.84 GENERALIZED ABDOMINAL PAIN: ICD-10-CM

## 2024-11-18 PROCEDURE — 76700 US EXAM ABDOM COMPLETE: CPT | Mod: TC

## 2024-11-18 PROCEDURE — 76700 US EXAM ABDOM COMPLETE: CPT | Mod: 26,,, | Performed by: RADIOLOGY

## 2024-11-18 NOTE — TELEPHONE ENCOUNTER
----- Message from TEENA Ascencio sent at 11/18/2024  3:42 PM CST -----  She has gallstones and sludge. I am referring to general surgery.

## 2024-11-26 ENCOUNTER — OFFICE VISIT (OUTPATIENT)
Dept: SURGERY | Facility: CLINIC | Age: 69
End: 2024-11-26
Attending: SURGERY
Payer: COMMERCIAL

## 2024-11-26 VITALS — WEIGHT: 150 LBS | HEIGHT: 65 IN | BODY MASS INDEX: 24.99 KG/M2

## 2024-11-26 DIAGNOSIS — K80.20 CALCULUS OF GALLBLADDER WITHOUT CHOLECYSTITIS WITHOUT OBSTRUCTION: ICD-10-CM

## 2024-11-26 DIAGNOSIS — R10.12 LEFT UPPER QUADRANT ABDOMINAL PAIN: Primary | ICD-10-CM

## 2024-11-26 PROCEDURE — 99999 PR PBB SHADOW E&M-EST. PATIENT-LVL IV: CPT | Mod: PBBFAC,,, | Performed by: SURGERY

## 2024-11-26 PROCEDURE — 4010F ACE/ARB THERAPY RXD/TAKEN: CPT | Mod: CPTII,,, | Performed by: SURGERY

## 2024-11-26 PROCEDURE — 99214 OFFICE O/P EST MOD 30 MIN: CPT | Mod: PBBFAC | Performed by: SURGERY

## 2024-11-26 PROCEDURE — 1159F MED LIST DOCD IN RCRD: CPT | Mod: CPTII,,, | Performed by: SURGERY

## 2024-11-26 PROCEDURE — 3008F BODY MASS INDEX DOCD: CPT | Mod: CPTII,,, | Performed by: SURGERY

## 2024-11-26 PROCEDURE — 99203 OFFICE O/P NEW LOW 30 MIN: CPT | Mod: S$PBB,,, | Performed by: SURGERY

## 2024-11-27 NOTE — PROGRESS NOTES
General Surgery History and Physical      Patient ID: Xavier Rodriguez is a 69 y.o. female.    Chief Complaint: Gall Bladder Problem      HPI:  69-year-old female brought in by her daughter for progressively worsening weakness, lethargy, and decreased appetite.  She is not a very good historian and most of it is from her daughter.  She has had some intermittent nausea and decreased appetite and only drinks water now.  She is currently in a wheelchair and she has been losing strength and weakness.  Unsure of the exact etiology for it.  She does have a history of left sided abdominal pain.  Unsure why she was eating less she did have an ultrasound that showed sludge.  When I asked her she denies any nausea or vomiting she just can not really eat much.  They tried to get already insurance but she is not even taking that    Review of Systems   Constitutional:  Positive for fever. Negative for activity change, appetite change and fatigue.   HENT:  Negative for trouble swallowing.    Respiratory:  Negative for cough and shortness of breath.    Cardiovascular:  Negative for chest pain and palpitations.   Gastrointestinal:  Positive for abdominal pain and nausea. Negative for abdominal distention, blood in stool, constipation and diarrhea.   Genitourinary:  Negative for flank pain.   Musculoskeletal:  Negative for neck pain and neck stiffness.   Neurological:  Negative for weakness.       Current Outpatient Medications   Medication Sig Dispense Refill    ergocalciferol (ERGOCALCIFEROL) 50,000 unit Cap Take 1 capsule (50,000 Units total) by mouth every 30 days. 3 capsule 3    [START ON 11/27/2024] HYDROcodone-acetaminophen (NORCO) 7.5-325 mg per tablet Take 1 tablet by mouth once daily. 30 tablet 0    HYDROcodone-acetaminophen (NORCO) 7.5-325 mg per tablet Take 1 tablet by mouth once daily. 30 tablet 0    HYDROcodone-acetaminophen  (NORCO) 7.5-325 mg per tablet Take 1 tablet by mouth once daily. 30 tablet 0    lovastatin (MEVACOR) 20 MG tablet TAKE 1 TABLET BY MOUTH EVERY DAY IN THE EVENING 90 tablet 1    naproxen (NAPROSYN) 500 MG tablet TAKE 1 TABLET BY MOUTH DAILY AS NEEDED. 30 tablet 0    potassium chloride SA (K-DUR,KLOR-CON) 20 MEQ tablet Take 1 tablet (20 mEq total) by mouth once daily. 90 tablet 1    tamsulosin (FLOMAX) 0.4 mg Cap Take 1 capsule (0.4 mg total) by mouth once daily. 90 capsule 1    furosemide (LASIX) 20 MG tablet TAKE 1 TABLET BY MOUTH EVERY DAY AS NEEDED 30 tablet 0    linaCLOtide (LINZESS) 145 mcg Cap capsule Take 1 capsule by mouth. (Patient not taking: Reported on 11/26/2024)      losartan (COZAAR) 25 MG tablet TAKE 1 TABLET BY MOUTH EVERY DAY (Patient not taking: Reported on 11/26/2024) 90 tablet 1     No current facility-administered medications for this visit.       Review of patient's allergies indicates:  No Known Allergies    Past Medical History:   Diagnosis Date    Chronic low back pain     Depression     Dysfunctional voiding of urine 11/10/2021    Patient states she does not urinate.  She was taken off of lasix which was the only thing that helped her empty. PVR 0 ml Chronic constipation, lifelong.  Has been out of Linzess for a month now. 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    INJECTION OF ANESTHETIC AGENT AROUND MEDIAL BRANCH NERVES INNERVATING LUMBAR FACET JOINT Bilateral 4/13/2023    Procedure: Bilateral L4-5,5-S1 MBB;  Surgeon: Ilsa Mendez MD;  Location: [facility];  Service: Pain Management;  Laterality: Bilateral;    TUMOR REMOVAL         Family History   Problem Relation Name Age of Onset    Heart disease Mother Telma Rodriguez     Heart attack Mother Telma Michael     Heart disease  Father Alfonso Rodriguez         Heart attack    Heart attack Father Alfonso Rodriguez        Social History     Socioeconomic History    Marital status: Single   Tobacco Use    Smoking status: Former     Current packs/day: 0.00     Average packs/day: 0.5 packs/day for 20.0 years (10.0 ttl pk-yrs)     Types: Cigarettes     Start date:      Quit date:      Years since quittin.9     Passive exposure: Past    Smokeless tobacco: Never    Tobacco comments:     Stop smoking about 15 years ago   Substance and Sexual Activity    Alcohol use: Never    Drug use: Yes     Types: Hydrocodone    Sexual activity: Not Currently     Partners: Male     Birth control/protection: None     Social Drivers of Health     Financial Resource Strain: Low Risk  (2024)    Overall Financial Resource Strain (CARDIA)     Difficulty of Paying Living Expenses: Not very hard   Food Insecurity: No Food Insecurity (2024)    Hunger Vital Sign     Worried About Running Out of Food in the Last Year: Never true     Ran Out of Food in the Last Year: Never true   Transportation Needs: No Transportation Needs (2024)    PRAPARE - Transportation     Lack of Transportation (Medical): No     Lack of Transportation (Non-Medical): No   Physical Activity: Inactive (2024)    Exercise Vital Sign     Days of Exercise per Week: 0 days     Minutes of Exercise per Session: 0 min   Stress: No Stress Concern Present (2024)    [employer] - Occupational Stress Questionnaire     Feeling of Stress : Only a little   Housing Stability: Low Risk  (2024)    Housing Stability Vital Sign     Unable to Pay for Housing in the Last Year: No     Homeless in the Last Year: No       There were no vitals filed for this visit.    Physical Exam  Constitutional:       General: She is not in acute distress.  HENT:      Head: Normocephalic.   Cardiovascular:      Rate and Rhythm: Normal rate and regular rhythm.      Pulses: Normal  pulses.   Pulmonary:      Effort: Pulmonary effort is normal. No respiratory distress.      Breath sounds: Normal breath sounds.   Abdominal:      General: Abdomen is flat. There is no distension.      Palpations: Abdomen is soft.      Tenderness: There is no abdominal tenderness.   Musculoskeletal:         General: Normal range of motion.   Skin:     General: Skin is warm.   Neurological:      General: No focal deficit present.      Mental Status: Mental status is at baseline.         Assessment & Plan:    Left upper quadrant abdominal pain  -     NM Hepatobiliary (HIDA) W Pharm and EF When Performed; Future; Expected date: 11/26/2024    Calculus of gallbladder without cholecystitis without obstruction  -     Ambulatory referral/consult to General Surgery        Cholelithiasis.  Unsure if that is her true etiology of her symptoms.  Relatively high-risk patient to take to the operating room.  Will get a HIDA scan and hopefully we can follow up the results and she will come back afterwards.  Positive would consider laparoscopic cholecystectomy.  Otherwise daughter understands the plan is agreeing to it.  All questions answered.

## 2024-11-30 DIAGNOSIS — E78.5 HYPERLIPIDEMIA, UNSPECIFIED HYPERLIPIDEMIA TYPE: ICD-10-CM

## 2024-12-02 RX ORDER — LOVASTATIN 20 MG/1
TABLET ORAL
Qty: 90 TABLET | Refills: 1 | Status: SHIPPED | OUTPATIENT
Start: 2024-12-02

## 2024-12-03 ENCOUNTER — DOCUMENT SCAN (OUTPATIENT)
Dept: HOME HEALTH SERVICES | Facility: HOSPITAL | Age: 69
End: 2024-12-03
Payer: COMMERCIAL

## 2024-12-09 ENCOUNTER — HOSPITAL ENCOUNTER (OUTPATIENT)
Dept: RADIOLOGY | Facility: HOSPITAL | Age: 69
Discharge: HOME | End: 2024-12-09
Attending: SURGERY
Payer: COMMERCIAL

## 2024-12-09 ENCOUNTER — DOCUMENT SCAN (OUTPATIENT)
Dept: HOME HEALTH SERVICES | Facility: HOSPITAL | Age: 69
End: 2024-12-09
Payer: COMMERCIAL

## 2024-12-09 DIAGNOSIS — R10.12 LEFT UPPER QUADRANT ABDOMINAL PAIN: ICD-10-CM

## 2024-12-09 PROCEDURE — 78227 HEPATOBIL SYST IMAGE W/DRUG: CPT | Mod: 26,,, | Performed by: NUCLEAR MEDICINE

## 2024-12-09 PROCEDURE — 78227 HEPATOBIL SYST IMAGE W/DRUG: CPT | Mod: TC

## 2024-12-09 PROCEDURE — A9537 TC99M MEBROFENIN: HCPCS | Performed by: SURGERY

## 2024-12-09 RX ORDER — KIT FOR THE PREPARATION OF TECHNETIUM TC 99M MEBROFENIN 45 MG/10ML
5.1 INJECTION, POWDER, LYOPHILIZED, FOR SOLUTION INTRAVENOUS
Status: COMPLETED | OUTPATIENT
Start: 2024-12-09 | End: 2024-12-09

## 2024-12-09 RX ADMIN — MEBROFENIN 5.1 MILLICURIE: 45 INJECTION, POWDER, LYOPHILIZED, FOR SOLUTION INTRAVENOUS at 09:12

## 2024-12-10 ENCOUNTER — TELEPHONE (OUTPATIENT)
Dept: SURGERY | Facility: CLINIC | Age: 69
End: 2024-12-10
Payer: COMMERCIAL

## 2024-12-10 DIAGNOSIS — G89.4 CHRONIC PAIN SYNDROME: ICD-10-CM

## 2024-12-10 RX ORDER — NAPROXEN 500 MG/1
500 TABLET ORAL DAILY PRN
Qty: 30 TABLET | Refills: 0 | Status: SHIPPED | OUTPATIENT
Start: 2024-12-10

## 2024-12-10 NOTE — TELEPHONE ENCOUNTER
Informed pt daughter about getting tube placed 12/13/2024. Informed pt daughter to make sure pt is NPO after midnight and to show up at [facility] at 0730. Denies further questions at this time.

## 2024-12-19 ENCOUNTER — EXTERNAL HOME HEALTH (OUTPATIENT)
Dept: HOME HEALTH SERVICES | Facility: HOSPITAL | Age: 69
End: 2024-12-19
Payer: COMMERCIAL

## 2024-12-19 ENCOUNTER — OFFICE VISIT (OUTPATIENT)
Dept: SURGERY | Facility: CLINIC | Age: 69
End: 2024-12-19
Attending: SURGERY
Payer: COMMERCIAL

## 2024-12-19 DIAGNOSIS — K82.8 BILIARY DYSKINESIA: Primary | ICD-10-CM

## 2024-12-19 PROCEDURE — 4010F ACE/ARB THERAPY RXD/TAKEN: CPT | Mod: CPTII,,, | Performed by: SURGERY

## 2024-12-19 PROCEDURE — 99213 OFFICE O/P EST LOW 20 MIN: CPT | Mod: S$PBB,,, | Performed by: SURGERY

## 2024-12-19 PROCEDURE — 99999 PR PBB SHADOW E&M-EST. PATIENT-LVL III: CPT | Mod: PBBFAC,,, | Performed by: SURGERY

## 2024-12-19 PROCEDURE — 99213 OFFICE O/P EST LOW 20 MIN: CPT | Mod: PBBFAC | Performed by: SURGERY

## 2024-12-19 PROCEDURE — 1159F MED LIST DOCD IN RCRD: CPT | Mod: CPTII,,, | Performed by: SURGERY

## 2024-12-19 NOTE — PROGRESS NOTES
General Surgery Progress Note    Subjective:      Patient ID: Xavier Rodriguez is a 69 y.o. female.    Chief Complaint: Gall Bladder Problem and Follow-up      HPI:  69 year old female status post percutaneous cholecystostomy tube placement by IR due to biliary dyskinesia.  Family/daughter states that patient has not really seen much improvement since her procedure.  Still not eating much still pretty weak still difficult to get around.  Has a general consensus that this is just her worsening of her dementia.  Before patient was not very vocal about any of her issues and still is not now.  Patient is not really eating very much they have tried using recently but it is still hard to get anything down her.  Does not seem to be a pain or postprandial functional issue just a general lack of ability to swallow    Past Medical History:   Diagnosis Date    Chronic low back pain     Depression     Dysfunctional voiding of urine 11/10/2021    Patient states she does not urinate.  She was taken off of lasix which was the only thing that helped her empty. PVR 0 ml Chronic constipation, lifelong.  Has been out of Linzess for a month now. 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    INJECTION OF ANESTHETIC AGENT AROUND MEDIAL BRANCH NERVES INNERVATING LUMBAR FACET JOINT Bilateral 4/13/2023    Procedure: Bilateral L4-5,5-S1 MBB;  Surgeon: Ilsa Mendez MD;  Location: [facility];  Service: Pain Management;  Laterality: Bilateral;    TUMOR REMOVAL       Social History     Socioeconomic History    Marital status: Single   Tobacco Use    Smoking status: Former     Current packs/day: 0.00     Average packs/day: 0.5 packs/day for 20.0 years (10.0 ttl pk-yrs)     Types:  Cigarettes     Start date:      Quit date:      Years since quittin.9     Passive exposure: Past    Smokeless tobacco: Never    Tobacco comments:     Stop smoking about 15 years ago   Substance and Sexual Activity    Alcohol use: Never    Drug use: Yes     Types: Hydrocodone    Sexual activity: Not Currently     Partners: Male     Birth control/protection: None     Social Drivers of Health     Financial Resource Strain: Low Risk  (2024)    Overall Financial Resource Strain (CARDIA)     Difficulty of Paying Living Expenses: Not very hard   Food Insecurity: No Food Insecurity (2024)    Hunger Vital Sign     Worried About Running Out of Food in the Last Year: Never true     Ran Out of Food in the Last Year: Never true   Transportation Needs: No Transportation Needs (2024)    PRAPARE - Transportation     Lack of Transportation (Medical): No     Lack of Transportation (Non-Medical): No   Physical Activity: Inactive (2024)    Exercise Vital Sign     Days of Exercise per Week: 0 days     Minutes of Exercise per Session: 0 min   Stress: No Stress Concern Present (2024)    [employer] - Occupational Stress Questionnaire     Feeling of Stress : Only a little   Housing Stability: Low Risk  (2024)    Housing Stability Vital Sign     Unable to Pay for Housing in the Last Year: No     Homeless in the Last Year: No         Current Outpatient Medications:     ergocalciferol (ERGOCALCIFEROL) 50,000 unit Cap, Take 1 capsule (50,000 Units total) by mouth every 30 days., Disp: 3 capsule, Rfl: 3    furosemide (LASIX) 20 MG tablet, TAKE 1 TABLET BY MOUTH EVERY DAY AS NEEDED, Disp: 30 tablet, Rfl: 0    HYDROcodone-acetaminophen (NORCO) 7.5-325 mg per tablet, Take 1 tablet by mouth once daily., Disp: 30 tablet, Rfl: 0    HYDROcodone-acetaminophen (NORCO) 7.5-325 mg per tablet, Take 1 tablet by mouth once daily., Disp: 30 tablet, Rfl: 0    HYDROcodone-acetaminophen (NORCO)  7.5-325 mg per tablet, Take 1 tablet by mouth once daily., Disp: 30 tablet, Rfl: 0    linaCLOtide (LINZESS) 145 mcg Cap capsule, Take 1 capsule by mouth. (Patient not taking: Reported on 11/26/2024), Disp: , Rfl:     losartan (COZAAR) 25 MG tablet, TAKE 1 TABLET BY MOUTH EVERY DAY (Patient not taking: Reported on 11/26/2024), Disp: 90 tablet, Rfl: 1    lovastatin (MEVACOR) 20 MG tablet, TAKE 1 TABLET BY MOUTH EVERY DAY IN THE EVENING, Disp: 90 tablet, Rfl: 1    naproxen (NAPROSYN) 500 MG tablet, TAKE 1 TABLET BY MOUTH EVERY DAY AS NEEDED, Disp: 30 tablet, Rfl: 0    potassium chloride SA (K-DUR,KLOR-CON) 20 MEQ tablet, Take 1 tablet (20 mEq total) by mouth once daily., Disp: 90 tablet, Rfl: 1    tamsulosin (FLOMAX) 0.4 mg Cap, Take 1 capsule (0.4 mg total) by mouth once daily., Disp: 90 capsule, Rfl: 1  Review of patient's allergies indicates:  No Known Allergies    There were no vitals taken for this visit.    Review of Systems   Constitutional: Negative for chills, fever, weight gain and weight loss.   Eyes:  Negative for blurred vision.   Cardiovascular:  Negative for chest pain, claudication and palpitations.   Respiratory:  Negative for cough and shortness of breath.    Musculoskeletal:  Negative for muscle weakness.   Gastrointestinal:  Negative for abdominal pain, diarrhea, nausea and vomiting.   Neurological:  Negative for numbness and paresthesias.         Objective:     Constitutional: Well, No apparent distress  Mental Status:  At baseline  Eyes: Normal sclera, normal eyelid  Neck: Trachea midline, no masses on neck exam  Respiratory: Clear to auscultation bilaterally with no wheezes/crackles/cough  Cardiac: Regular rate and rhythm, no murmur, rub, or gallops  Abdominal: Soft, non-tender, non-distented  Hernia: No appreciated hernias  Musculoskeletal: 5/5 strength no weakess no decreased range of montion  Neurologic: Cranial nerves II - XII intact    Labs/ Imaging: CBC:   Lab Results   Component Value  Date/Time    WBC 6.67 11/07/2024 05:44 PM    RBC 5.09 11/07/2024 05:44 PM    HGB 14.1 11/07/2024 05:44 PM    HCT 42.4 11/07/2024 05:44 PM     11/07/2024 05:44 PM    MCV 83.3 11/07/2024 05:44 PM    MCH 27.7 11/07/2024 05:44 PM    MCHC 33.3 11/07/2024 05:44 PM     BMP:   Lab Results   Component Value Date/Time     (H) 11/07/2024 05:44 PM    CO2 29 11/07/2024 05:44 PM    BUN 21 (H) 11/07/2024 05:44 PM    CREATININE 0.83 11/07/2024 05:44 PM    CALCIUM 9.6 11/07/2024 05:44 PM           Assessment:             1. Biliary dyskinesia          Plan:          No follow-ups on file.      Patient has transportation issues due to her current condition.  Will attempt to get home health set up to go to her house and pull her biliary drain in about 2 or 3 weeks.  Offered patient's family if they wanted to continue care a feeding tube would probably be the next step if her dementia was the reason she is not eating.  They have declined that.  All questions answered.  She will come back p.r.n..

## 2024-12-27 ENCOUNTER — TELEPHONE (OUTPATIENT)
Dept: SURGERY | Facility: CLINIC | Age: 69
End: 2024-12-27
Payer: COMMERCIAL

## 2024-12-27 NOTE — TELEPHONE ENCOUNTER
Patients daughter called with w/questions ref taking care of percutaneous kimberly tube. I spoke with Eugenie at [facility] and she will call daughter and let her now plan of care. Eugenie also will talk to patients daughter re appetite and protocol.   ----- Message from Med Assistant Vizcarra sent at 12/27/2024  8:28 AM CST -----  Who Called: Jodi (daughter)    Caller is requesting assistance/information from provider's office.      Preferred Method of Contact: Phone Call  Patient's Preferred Phone Number on File: [phone]   Best Call Back Number, if different:   Additional Information: how to care for wound

## 2025-01-05 DIAGNOSIS — G89.4 CHRONIC PAIN SYNDROME: ICD-10-CM

## 2025-01-06 RX ORDER — NAPROXEN 500 MG/1
500 TABLET ORAL DAILY PRN
Qty: 30 TABLET | Refills: 0 | Status: SHIPPED | OUTPATIENT
Start: 2025-01-06

## 2025-01-17 ENCOUNTER — EXTERNAL HOME HEALTH (OUTPATIENT)
Dept: HOME HEALTH SERVICES | Facility: HOSPITAL | Age: 70
End: 2025-01-17
Payer: COMMERCIAL

## 2025-02-14 ENCOUNTER — DOCUMENT SCAN (OUTPATIENT)
Dept: HOME HEALTH SERVICES | Facility: HOSPITAL | Age: 70
End: 2025-02-14
Payer: COMMERCIAL

## (undated) DEVICE — KIT IV START RUSH

## (undated) DEVICE — GLOVE PROTEXIS PI SYN SURG 6.5

## (undated) DEVICE — CATH IV 22G X 1 AUTOGUARD

## (undated) DEVICE — TRAY NERVE BLOCK UNIV 10/CA

## (undated) DEVICE — NDL SPINAL SPINOCAN 22GX3.5

## (undated) DEVICE — APPLICATOR CHLORAPREP ORN 26ML

## (undated) DEVICE — SOL CONTINU-FLO SET 2 LAV